# Patient Record
Sex: FEMALE | Race: WHITE | NOT HISPANIC OR LATINO | Employment: FULL TIME | URBAN - METROPOLITAN AREA
[De-identification: names, ages, dates, MRNs, and addresses within clinical notes are randomized per-mention and may not be internally consistent; named-entity substitution may affect disease eponyms.]

---

## 2018-05-24 ENCOUNTER — OFFICE VISIT (OUTPATIENT)
Dept: URGENT CARE | Facility: CLINIC | Age: 23
End: 2018-05-24
Payer: COMMERCIAL

## 2018-05-24 VITALS
HEIGHT: 66 IN | BODY MASS INDEX: 26.01 KG/M2 | DIASTOLIC BLOOD PRESSURE: 70 MMHG | OXYGEN SATURATION: 100 % | SYSTOLIC BLOOD PRESSURE: 110 MMHG | WEIGHT: 161.81 LBS | RESPIRATION RATE: 15 BRPM | TEMPERATURE: 98 F | HEART RATE: 74 BPM

## 2018-05-24 DIAGNOSIS — J32.9 SINUSITIS, UNSPECIFIED CHRONICITY, UNSPECIFIED LOCATION: Primary | ICD-10-CM

## 2018-05-24 PROCEDURE — 99214 OFFICE O/P EST MOD 30 MIN: CPT | Mod: S$GLB,,, | Performed by: PHYSICIAN ASSISTANT

## 2018-05-24 PROCEDURE — 99999 PR PBB SHADOW E&M-EST. PATIENT-LVL IV: CPT | Mod: PBBFAC,,, | Performed by: PHYSICIAN ASSISTANT

## 2018-05-24 RX ORDER — AMOXICILLIN AND CLAVULANATE POTASSIUM 875; 125 MG/1; MG/1
1 TABLET, FILM COATED ORAL 2 TIMES DAILY
Qty: 14 TABLET | Refills: 0 | Status: SHIPPED | OUTPATIENT
Start: 2018-05-24 | End: 2018-05-31

## 2018-05-24 RX ORDER — PROMETHAZINE HYDROCHLORIDE AND DEXTROMETHORPHAN HYDROBROMIDE 6.25; 15 MG/5ML; MG/5ML
5 SYRUP ORAL EVERY 6 HOURS PRN
Qty: 120 ML | Refills: 0 | Status: SHIPPED | OUTPATIENT
Start: 2018-05-24 | End: 2019-11-18

## 2018-05-24 RX ORDER — BENZONATATE 200 MG/1
200 CAPSULE ORAL 3 TIMES DAILY PRN
Qty: 30 CAPSULE | Refills: 0 | Status: SHIPPED | OUTPATIENT
Start: 2018-05-24 | End: 2019-11-18

## 2018-05-24 NOTE — PATIENT INSTRUCTIONS
Sinusitis (Antibiotic Treatment)    The sinuses are air-filled spaces within the bones of the face. They connect to the inside of the nose. Sinusitis is an inflammation of the tissue lining the sinus cavity. Sinus inflammation can occur during a cold. It can also be due to allergies to pollens and other particles in the air. Sinusitis can cause symptoms of sinus congestion and fullness. A sinus infection causes fever, headache and facial pain. There is often green or yellow drainage from the nose or into the back of the throat (post-nasal drip). You have been given antibiotics to treat this condition.  Home care:  · Take the full course of antibiotics as instructed. Do not stop taking them, even if you feel better.  · Drink plenty of water, hot tea, and other liquids. This may help thin mucus. It also may promote sinus drainage.  · Heat may help soothe painful areas of the face. Use a towel soaked in hot water. Or,  the shower and direct the hot spray onto your face. Using a vaporizer along with a menthol rub at night may also help.   · An expectorant containing guaifenesin may help thin the mucus and promote drainage from the sinuses.  · Over-the-counter decongestants may be used unless a similar medicine was prescribed. Nasal sprays work the fastest. Use one that contains phenylephrine or oxymetazoline. First blow the nose gently. Then use the spray. Do not use these medicines more often than directed on the label or symptoms may get worse. You may also use tablets containing pseudoephedrine. Avoid products that combine ingredients, because side effects may be increased. Read labels. You can also ask the pharmacist for help. (NOTE: Persons with high blood pressure should not use decongestants. They can raise blood pressure.)  · Over-the-counter antihistamines may help if allergies contributed to your sinusitis.    · Do not use nasal rinses or irrigation during an acute sinus infection, unless told to by  your health care provider. Rinsing may spread the infection to other sinuses.  · Use acetaminophen or ibuprofen to control pain, unless another pain medicine was prescribed. (If you have chronic liver or kidney disease or ever had a stomach ulcer, talk with your doctor before using these medicines. Aspirin should never be used in anyone under 18 years of age who is ill with a fever. It may cause severe liver damage.)  · Don't smoke. This can worsen symptoms.  Follow-up care  Follow up with your healthcare provider or our staff if you are not improving within the next week.  When to seek medical advice  Call your healthcare provider if any of these occur:  · Facial pain or headache becoming more severe  · Stiff neck  · Unusual drowsiness or confusion  · Swelling of the forehead or eyelids  · Vision problems, including blurred or double vision  · Fever of 100.4ºF (38ºC) or higher, or as directed by your healthcare provider  · Seizure  · Breathing problems  · Symptoms not resolving within 10 days  Date Last Reviewed: 4/13/2015  © 5155-2447 [employer]. [address] [place] [address]. All rights reserved. This information is not intended as a substitute for professional medical care. Always follow your healthcare professional's instructions.      Take all antibiotics even if you feel better before completing the entire regimen.   -Use normal saline nasal spray during the day every 3 hours for sinus irrigation and congestion.    -Avoid exposure to cigarette smoke.    -Practice good handwashing.  -Use warm compresses to sinuses for relief several times a day  -Increase fluid intake to at least 64 ounces of water per day to keep secretions thin and loose  -Use a humidifier in home to help relieve congestion or steam inhalation three times a day for 20-30 minutes.  -Sleep with head of bed elevated   -Take tylenol or ibuprofen as needed for sinus headache.    -Use warm salt water gargles for throat  discomfort.  -Avoid caffeine and alcohol    Follow up with PCP in 1 week if no improvement or sooner if worsening.    Go to ER if you develop fever of 103 or higher, chest pain, shortness of breath, upper back pain, stiff neck or severe headache.

## 2018-05-24 NOTE — PROGRESS NOTES
"Subjective:       Patient ID: Charlotte Felix is a 23 y.o. female.    Chief Complaint: Cough and Nasal Congestion    Cough   This is a new problem. The current episode started 1 to 4 weeks ago (has been sick for past month, was much better after 2 weeks of URI symptoms then over past 1 week has been getting worse, productive cough, post nasal drainage). The problem has been gradually worsening. The cough is productive of sputum. Associated symptoms include nasal congestion and postnasal drip. Pertinent negatives include no chest pain, chills, ear pain, fever, headaches, myalgias, rash, rhinorrhea, sore throat, shortness of breath or wheezing. Nothing aggravates the symptoms. Treatments tried: dayquil, mucinex, nyquil. The treatment provided mild relief. There is no history of asthma.     Review of Systems   Constitutional: Negative for chills, fatigue and fever.   HENT: Positive for congestion and postnasal drip. Negative for ear discharge, ear pain, rhinorrhea, sinus pressure, sneezing and sore throat.    Eyes: Negative for pain and discharge.   Respiratory: Positive for cough. Negative for shortness of breath and wheezing.    Cardiovascular: Negative for chest pain and leg swelling.   Gastrointestinal: Negative for abdominal pain, nausea and vomiting.   Musculoskeletal: Negative for myalgias.   Skin: Negative for rash.   Neurological: Negative for headaches.       Objective:      /70 (BP Location: Left arm, Patient Position: Sitting, BP Method: Small (Manual))   Pulse 74   Temp 98 °F (36.7 °C) (Tympanic)   Resp 15   Ht 5' 6" (1.676 m)   Wt 73.4 kg (161 lb 13.1 oz)   LMP 05/17/2018   SpO2 100%   BMI 26.12 kg/m²   Physical Exam   Constitutional: She is oriented to person, place, and time. She appears well-developed and well-nourished. No distress.   HENT:   Head: Normocephalic and atraumatic.   Right Ear: Tympanic membrane, external ear and ear canal normal.   Left Ear: Tympanic membrane, external " ear and ear canal normal.   Nose: Nose normal. Right sinus exhibits no maxillary sinus tenderness and no frontal sinus tenderness. Left sinus exhibits no maxillary sinus tenderness and no frontal sinus tenderness.   Mouth/Throat: Oropharynx is clear and moist. No oropharyngeal exudate or posterior oropharyngeal erythema. No tonsillar exudate.   Eyes: Conjunctivae and EOM are normal. Pupils are equal, round, and reactive to light. Right eye exhibits no discharge. Left eye exhibits no discharge.   Neck: Normal range of motion. Neck supple.   Cardiovascular: Normal rate, regular rhythm, normal heart sounds and intact distal pulses.  Exam reveals no gallop and no friction rub.    No murmur heard.  Pulmonary/Chest: Effort normal and breath sounds normal. No stridor. No respiratory distress. She has no wheezes. She has no rales. She exhibits no tenderness.   Lymphadenopathy:     She has no cervical adenopathy.   Neurological: She is alert and oriented to person, place, and time. Coordination normal.   Skin: Skin is warm and dry. No rash noted. She is not diaphoretic. No erythema. No pallor.   Nursing note and vitals reviewed.      Assessment:       1. Sinusitis, unspecified chronicity, unspecified location        Plan:       Sinusitis, unspecified chronicity, unspecified location  -     amoxicillin-clavulanate 875-125mg (AUGMENTIN) 875-125 mg per tablet; Take 1 tablet by mouth 2 (two) times daily.  Dispense: 14 tablet; Refill: 0  -     benzonatate (TESSALON) 200 MG capsule; Take 1 capsule (200 mg total) by mouth 3 (three) times daily as needed for Cough.  Dispense: 30 capsule; Refill: 0  -     promethazine-dextromethorphan (PROMETHAZINE-DM) 6.25-15 mg/5 mL Syrp; Take 5 mLs by mouth every 6 (six) hours as needed (congestion).  Dispense: 120 mL; Refill: 0    May have secondary bacterial infection following initial viral illness with improvement then worsening, start augmentin, continue mucinex and supportive care.      Take  all antibiotics even if you feel better before completing the entire regimen.   -Use normal saline nasal spray during the day every 3 hours for sinus irrigation and congestion.    -Avoid exposure to cigarette smoke.    -Practice good handwashing.  -Use warm compresses to sinuses for relief several times a day  -Increase fluid intake to at least 64 ounces of water per day to keep secretions thin and loose  -Use a humidifier in home to help relieve congestion or steam inhalation three times a day for 20-30 minutes.  -Sleep with head of bed elevated   -Take tylenol or ibuprofen as needed for sinus headache.    -Use warm salt water gargles for throat discomfort.  -Avoid caffeine and alcohol    Follow up with PCP in 1 week if no improvement or sooner if worsening.    Go to ER if you develop fever of 103 or higher, chest pain, shortness of breath, upper back pain, stiff neck or severe headache.    Heather Trant PA-C [facility]

## 2019-05-07 ENCOUNTER — OFFICE VISIT (OUTPATIENT)
Dept: URGENT CARE | Facility: CLINIC | Age: 24
End: 2019-05-07
Payer: COMMERCIAL

## 2019-05-07 VITALS
SYSTOLIC BLOOD PRESSURE: 116 MMHG | WEIGHT: 165.38 LBS | BODY MASS INDEX: 26.58 KG/M2 | TEMPERATURE: 97 F | OXYGEN SATURATION: 98 % | HEART RATE: 78 BPM | HEIGHT: 66 IN | DIASTOLIC BLOOD PRESSURE: 70 MMHG

## 2019-05-07 DIAGNOSIS — J01.90 ACUTE BACTERIAL SINUSITIS: Primary | ICD-10-CM

## 2019-05-07 DIAGNOSIS — B96.89 ACUTE BACTERIAL SINUSITIS: Primary | ICD-10-CM

## 2019-05-07 PROCEDURE — 3008F PR BODY MASS INDEX (BMI) DOCUMENTED: ICD-10-PCS | Mod: CPTII,S$GLB,, | Performed by: PHYSICIAN ASSISTANT

## 2019-05-07 PROCEDURE — 99999 PR PBB SHADOW E&M-EST. PATIENT-LVL III: ICD-10-PCS | Mod: PBBFAC,,, | Performed by: PHYSICIAN ASSISTANT

## 2019-05-07 PROCEDURE — 99214 OFFICE O/P EST MOD 30 MIN: CPT | Mod: S$GLB,,, | Performed by: PHYSICIAN ASSISTANT

## 2019-05-07 PROCEDURE — 3008F BODY MASS INDEX DOCD: CPT | Mod: CPTII,S$GLB,, | Performed by: PHYSICIAN ASSISTANT

## 2019-05-07 PROCEDURE — 99214 PR OFFICE/OUTPT VISIT, EST, LEVL IV, 30-39 MIN: ICD-10-PCS | Mod: S$GLB,,, | Performed by: PHYSICIAN ASSISTANT

## 2019-05-07 PROCEDURE — 99999 PR PBB SHADOW E&M-EST. PATIENT-LVL III: CPT | Mod: PBBFAC,,, | Performed by: PHYSICIAN ASSISTANT

## 2019-05-07 RX ORDER — PREDNISONE 20 MG/1
20 TABLET ORAL DAILY
Qty: 5 TABLET | Refills: 0
Start: 2019-05-07 | End: 2019-05-12

## 2019-05-07 RX ORDER — AZITHROMYCIN 250 MG/1
250 TABLET, FILM COATED ORAL DAILY
Qty: 6 TABLET | Refills: 0
Start: 2019-05-07 | End: 2019-05-12

## 2019-05-07 NOTE — PROGRESS NOTES
Subjective:      Patient ID: Charlotte Felix is a 24 y.o. female.    Chief Complaint: URI    URI    This is a new problem. Episode onset: 3 days ago  The problem has been gradually worsening. There has been no fever. Associated symptoms include congestion, coughing, sinus pain and a sore throat. Pertinent negatives include no abdominal pain, diarrhea, ear pain, nausea, neck pain, rash, vomiting or wheezing. Treatments tried: mucinex      Review of Systems   Constitutional: Negative for fever.   HENT: Positive for congestion, sinus pain and sore throat. Negative for ear discharge and ear pain.    Eyes: Negative for pain and redness.   Respiratory: Positive for cough. Negative for shortness of breath and wheezing.    Gastrointestinal: Negative for abdominal pain, diarrhea, nausea and vomiting.   Musculoskeletal: Negative for neck pain.   Skin: Negative for rash.       Objective:   /70   Pulse 78   Temp 97 °F (36.1 °C)   Wt 75 kg (165 lb 5.5 oz)   SpO2 98%   BMI 26.69 kg/m²   Physical Exam   Constitutional: She is oriented to person, place, and time. She appears well-developed and well-nourished. No distress.   HENT:   Head: Normocephalic.   Right Ear: Tympanic membrane, external ear and ear canal normal.   Left Ear: Tympanic membrane, external ear and ear canal normal.   Nose: No mucosal edema or rhinorrhea. Right sinus exhibits maxillary sinus tenderness. Right sinus exhibits no frontal sinus tenderness. Left sinus exhibits maxillary sinus tenderness. Left sinus exhibits no frontal sinus tenderness.   Mouth/Throat: Uvula is midline, oropharynx is clear and moist and mucous membranes are normal. No oropharyngeal exudate, posterior oropharyngeal edema or posterior oropharyngeal erythema.   Eyes: Conjunctivae and EOM are normal.   Neck: Normal range of motion. Neck supple.   Cardiovascular: Normal rate, regular rhythm and normal heart sounds.   Pulmonary/Chest: Effort normal and breath sounds normal. No  accessory muscle usage. No apnea, no tachypnea and no bradypnea. No respiratory distress. She has no decreased breath sounds. She has no wheezes. She has no rhonchi. She has no rales.   Lymphadenopathy:        Head (right side): No submental, no submandibular and no tonsillar adenopathy present.        Head (left side): No submental, no submandibular and no tonsillar adenopathy present.     She has no cervical adenopathy.   Neurological: She is alert and oriented to person, place, and time.   Skin: Skin is warm and dry. She is not diaphoretic.     Assessment:      1. Acute bacterial sinusitis       Plan:   Acute bacterial sinusitis  -     predniSONE (DELTASONE) 20 MG tablet; Take 1 tablet (20 mg total) by mouth once daily. for 5 days  Dispense: 5 tablet; Refill: 0  -     azithromycin (Z-KALEY) 250 MG tablet; Take 1 tablet (250 mg total) by mouth once daily. Take 2 tablets by mouth on day 1, then one tablet daily on days 2-5. for 5 days  Dispense: 6 tablet; Refill: 0    Acute Sinusitis    -  discussed oral steroid to reduce nasal passageway inflammation.  Patient informed of potential side effects of oral steroid including elevating blood pressure, increased blood glucose levels, increased risk of opportunistic infections, peptic ulcer disease and GI bleeding, insomnia, tremors, suppression of ones own steroid production, avascular necrosis, osteoporosis, increased intraocular pressure, etc. Patient verbalizes risk/benefit profile and agrees to oral steroid   -  will cover with antibiotic for risk of lowering immune system with steroid    -  flonase, nasal saline irrigation, increase fluids     AVS provided and instructions reviewed with patient. Patient was counseled on supportive care and instructed to return or contact primary care provider if condition does not improve or for any new or worsening symptoms.    Love Joseph PA-C   Physician Assistant   [facility]

## 2019-11-18 ENCOUNTER — OFFICE VISIT (OUTPATIENT)
Dept: INTERNAL MEDICINE | Facility: CLINIC | Age: 24
End: 2019-11-18
Payer: COMMERCIAL

## 2019-11-18 VITALS
HEART RATE: 76 BPM | SYSTOLIC BLOOD PRESSURE: 118 MMHG | WEIGHT: 169.31 LBS | TEMPERATURE: 98 F | DIASTOLIC BLOOD PRESSURE: 80 MMHG | BODY MASS INDEX: 27.21 KG/M2 | HEIGHT: 66 IN

## 2019-11-18 DIAGNOSIS — R10.11 RUQ PAIN: Primary | ICD-10-CM

## 2019-11-18 PROCEDURE — 99999 PR PBB SHADOW E&M-EST. PATIENT-LVL III: ICD-10-PCS | Mod: PBBFAC,,, | Performed by: PHYSICIAN ASSISTANT

## 2019-11-18 PROCEDURE — 99213 OFFICE O/P EST LOW 20 MIN: CPT | Mod: 25,S$GLB,, | Performed by: PHYSICIAN ASSISTANT

## 2019-11-18 PROCEDURE — 90471 IMMUNIZATION ADMIN: CPT | Mod: S$GLB,,, | Performed by: PHYSICIAN ASSISTANT

## 2019-11-18 PROCEDURE — 99999 PR PBB SHADOW E&M-EST. PATIENT-LVL III: CPT | Mod: PBBFAC,,, | Performed by: PHYSICIAN ASSISTANT

## 2019-11-18 PROCEDURE — 90686 IIV4 VACC NO PRSV 0.5 ML IM: CPT | Mod: S$GLB,,, | Performed by: PHYSICIAN ASSISTANT

## 2019-11-18 PROCEDURE — 99213 PR OFFICE/OUTPT VISIT, EST, LEVL III, 20-29 MIN: ICD-10-PCS | Mod: 25,S$GLB,, | Performed by: PHYSICIAN ASSISTANT

## 2019-11-18 PROCEDURE — 90686 FLU VACCINE (QUAD) GREATER THAN OR EQUAL TO 3YO PRESERVATIVE FREE IM: ICD-10-PCS | Mod: S$GLB,,, | Performed by: PHYSICIAN ASSISTANT

## 2019-11-18 PROCEDURE — 3008F PR BODY MASS INDEX (BMI) DOCUMENTED: ICD-10-PCS | Mod: CPTII,S$GLB,, | Performed by: PHYSICIAN ASSISTANT

## 2019-11-18 PROCEDURE — 90471 FLU VACCINE (QUAD) GREATER THAN OR EQUAL TO 3YO PRESERVATIVE FREE IM: ICD-10-PCS | Mod: S$GLB,,, | Performed by: PHYSICIAN ASSISTANT

## 2019-11-18 PROCEDURE — 3008F BODY MASS INDEX DOCD: CPT | Mod: CPTII,S$GLB,, | Performed by: PHYSICIAN ASSISTANT

## 2019-11-18 RX ORDER — FAMOTIDINE 20 MG/1
20 TABLET, FILM COATED ORAL 2 TIMES DAILY
Qty: 60 TABLET | Refills: 0 | Status: SHIPPED | OUTPATIENT
Start: 2019-11-18 | End: 2021-09-29

## 2019-11-18 NOTE — PROGRESS NOTES
"Subjective:       Patient ID: Charlotte Felix is a 24 y.o. female.    Chief Complaint: upper GI       HPI     Patient comes in today for upper GI symptoms   Has been having issues on/off x months    Mainly gets RUQ pain after/during eating fried foods    No n/v    Was seen for this years ago, was onPPI but doesn't remembering taking it for very long    No black/blood stools  Health Maintenance Due   Topic Date Due    Lipid Panel  1995    HPV Vaccines (1 - Female 3-dose series) 01/03/2010    Pap Smear  01/03/2016    TETANUS VACCINE  11/21/2016       Past Medical History:   Diagnosis Date    Acne        Current Outpatient Medications   Medication Sig Dispense Refill    famotidine (PEPCID) 20 MG tablet Take 1 tablet (20 mg total) by mouth 2 (two) times daily. 60 tablet 0     No current facility-administered medications for this visit.        Review of Systems   Constitutional: Negative for fatigue, fever and unexpected weight change.   HENT: Negative for sore throat and trouble swallowing.    Respiratory: Negative for cough and shortness of breath.    Cardiovascular: Negative for chest pain.   Gastrointestinal: Negative for abdominal pain.   Skin: Negative for color change and pallor.   Hematological: Negative for adenopathy. Does not bruise/bleed easily.   All other systems reviewed and are negative.      Objective:   /80   Pulse 76   Temp 98.2 °F (36.8 °C) (Tympanic)   Ht 5' 6" (1.676 m)   Wt 76.8 kg (169 lb 5 oz)   BMI 27.33 kg/m²      Physical Exam   Constitutional: She is oriented to person, place, and time. She appears well-developed and well-nourished. No distress.   HENT:   Head: Normocephalic and atraumatic.   Eyes: Pupils are equal, round, and reactive to light. EOM are normal.   Neck: Normal range of motion. Neck supple.   Cardiovascular: Normal rate and regular rhythm.   Pulmonary/Chest: Effort normal.   Abdominal: Soft. Bowel sounds are normal. She exhibits no distension and no " mass. There is no tenderness. There is no guarding.   Musculoskeletal: She exhibits no edema.   Neurological: She is alert and oriented to person, place, and time.   Skin: Capillary refill takes less than 2 seconds.   Psychiatric: She has a normal mood and affect. Her behavior is normal.         Lab Results   Component Value Date    TSH 3.324 07/25/2016       Assessment:       1. RUQ pain        Plan:   RUQ pain  -     US Abdomen Limited; Future; Expected date: 11/18/2019    Other orders  -     famotidine (PEPCID) 20 MG tablet; Take 1 tablet (20 mg total) by mouth 2 (two) times daily.  Dispense: 60 tablet; Refill: 0  -     Influenza - Quadrivalent (PF)    seems reflux/gallbladder  Avoid fried/fatty foods  US, pepcid

## 2019-11-20 ENCOUNTER — TELEPHONE (OUTPATIENT)
Dept: RADIOLOGY | Facility: HOSPITAL | Age: 24
End: 2019-11-20

## 2019-11-21 ENCOUNTER — HOSPITAL ENCOUNTER (OUTPATIENT)
Dept: RADIOLOGY | Facility: HOSPITAL | Age: 24
Discharge: HOME | End: 2019-11-21
Attending: PHYSICIAN ASSISTANT
Payer: COMMERCIAL

## 2019-11-21 DIAGNOSIS — R10.11 RUQ PAIN: ICD-10-CM

## 2019-11-21 PROCEDURE — 76705 US ABDOMEN LIMITED: ICD-10-PCS | Mod: 26,,, | Performed by: RADIOLOGY

## 2019-11-21 PROCEDURE — 76705 ECHO EXAM OF ABDOMEN: CPT | Mod: 26,,, | Performed by: RADIOLOGY

## 2019-11-21 PROCEDURE — 76705 ECHO EXAM OF ABDOMEN: CPT | Mod: TC

## 2021-09-29 ENCOUNTER — OFFICE VISIT (OUTPATIENT)
Dept: PRIMARY CARE CLINIC | Facility: CLINIC | Age: 26
End: 2021-09-29
Payer: COMMERCIAL

## 2021-09-29 ENCOUNTER — LAB VISIT (OUTPATIENT)
Dept: LAB | Facility: HOSPITAL | Age: 26
End: 2021-09-29
Attending: FAMILY MEDICINE
Payer: COMMERCIAL

## 2021-09-29 VITALS
HEART RATE: 107 BPM | TEMPERATURE: 98 F | OXYGEN SATURATION: 97 % | HEIGHT: 66 IN | WEIGHT: 197.56 LBS | BODY MASS INDEX: 31.75 KG/M2 | SYSTOLIC BLOOD PRESSURE: 122 MMHG | DIASTOLIC BLOOD PRESSURE: 80 MMHG

## 2021-09-29 DIAGNOSIS — R09.81 NASAL CONGESTION: ICD-10-CM

## 2021-09-29 DIAGNOSIS — R10.11 RIGHT UPPER QUADRANT PAIN: Primary | ICD-10-CM

## 2021-09-29 DIAGNOSIS — R10.11 RIGHT UPPER QUADRANT PAIN: ICD-10-CM

## 2021-09-29 LAB
ALBUMIN SERPL BCP-MCNC: 3.7 G/DL (ref 3.5–5.2)
ALP SERPL-CCNC: 82 U/L (ref 55–135)
ALT SERPL W/O P-5'-P-CCNC: 25 U/L (ref 10–44)
ANION GAP SERPL CALC-SCNC: 10 MMOL/L (ref 8–16)
AST SERPL-CCNC: 21 U/L (ref 10–40)
BASOPHILS # BLD AUTO: 0.06 K/UL (ref 0–0.2)
BASOPHILS NFR BLD: 0.7 % (ref 0–1.9)
BILIRUB SERPL-MCNC: 0.3 MG/DL (ref 0.1–1)
BUN SERPL-MCNC: 10 MG/DL (ref 6–20)
CALCIUM SERPL-MCNC: 9.3 MG/DL (ref 8.7–10.5)
CHLORIDE SERPL-SCNC: 106 MMOL/L (ref 95–110)
CO2 SERPL-SCNC: 24 MMOL/L (ref 23–29)
CREAT SERPL-MCNC: 0.9 MG/DL (ref 0.5–1.4)
DIFFERENTIAL METHOD: NORMAL
EOSINOPHIL # BLD AUTO: 0.1 K/UL (ref 0–0.5)
EOSINOPHIL NFR BLD: 0.8 % (ref 0–8)
ERYTHROCYTE [DISTWIDTH] IN BLOOD BY AUTOMATED COUNT: 13.1 % (ref 11.5–14.5)
EST. GFR  (AFRICAN AMERICAN): >60 ML/MIN/1.73 M^2
EST. GFR  (NON AFRICAN AMERICAN): >60 ML/MIN/1.73 M^2
GLUCOSE SERPL-MCNC: 83 MG/DL (ref 70–110)
HCT VFR BLD AUTO: 42.2 % (ref 37–48.5)
HGB BLD-MCNC: 13.8 G/DL (ref 12–16)
IMM GRANULOCYTES # BLD AUTO: 0.02 K/UL (ref 0–0.04)
IMM GRANULOCYTES NFR BLD AUTO: 0.2 % (ref 0–0.5)
LYMPHOCYTES # BLD AUTO: 2.3 K/UL (ref 1–4.8)
LYMPHOCYTES NFR BLD: 26.2 % (ref 18–48)
MCH RBC QN AUTO: 30.3 PG (ref 27–31)
MCHC RBC AUTO-ENTMCNC: 32.7 G/DL (ref 32–36)
MCV RBC AUTO: 93 FL (ref 82–98)
MONOCYTES # BLD AUTO: 0.4 K/UL (ref 0.3–1)
MONOCYTES NFR BLD: 4.8 % (ref 4–15)
NEUTROPHILS # BLD AUTO: 5.9 K/UL (ref 1.8–7.7)
NEUTROPHILS NFR BLD: 67.3 % (ref 38–73)
NRBC BLD-RTO: 0 /100 WBC
PLATELET # BLD AUTO: 277 K/UL (ref 150–450)
PMV BLD AUTO: 11.7 FL (ref 9.2–12.9)
POTASSIUM SERPL-SCNC: 4.5 MMOL/L (ref 3.5–5.1)
PROT SERPL-MCNC: 8.1 G/DL (ref 6–8.4)
RBC # BLD AUTO: 4.55 M/UL (ref 4–5.4)
SODIUM SERPL-SCNC: 140 MMOL/L (ref 136–145)
T4 FREE SERPL-MCNC: 0.85 NG/DL (ref 0.71–1.51)
TSH SERPL DL<=0.005 MIU/L-ACNC: 1.76 UIU/ML (ref 0.4–4)
WBC # BLD AUTO: 8.73 K/UL (ref 3.9–12.7)

## 2021-09-29 PROCEDURE — 3008F PR BODY MASS INDEX (BMI) DOCUMENTED: ICD-10-PCS | Mod: CPTII,S$GLB,, | Performed by: FAMILY MEDICINE

## 2021-09-29 PROCEDURE — 84439 ASSAY OF FREE THYROXINE: CPT | Performed by: FAMILY MEDICINE

## 2021-09-29 PROCEDURE — 1160F RVW MEDS BY RX/DR IN RCRD: CPT | Mod: CPTII,S$GLB,, | Performed by: FAMILY MEDICINE

## 2021-09-29 PROCEDURE — 3079F DIAST BP 80-89 MM HG: CPT | Mod: CPTII,S$GLB,, | Performed by: FAMILY MEDICINE

## 2021-09-29 PROCEDURE — 3079F PR MOST RECENT DIASTOLIC BLOOD PRESSURE 80-89 MM HG: ICD-10-PCS | Mod: CPTII,S$GLB,, | Performed by: FAMILY MEDICINE

## 2021-09-29 PROCEDURE — 99999 PR PBB SHADOW E&M-EST. PATIENT-LVL IV: CPT | Mod: PBBFAC,,, | Performed by: FAMILY MEDICINE

## 2021-09-29 PROCEDURE — 84443 ASSAY THYROID STIM HORMONE: CPT | Performed by: FAMILY MEDICINE

## 2021-09-29 PROCEDURE — 80053 COMPREHEN METABOLIC PANEL: CPT | Performed by: FAMILY MEDICINE

## 2021-09-29 PROCEDURE — 3074F SYST BP LT 130 MM HG: CPT | Mod: CPTII,S$GLB,, | Performed by: FAMILY MEDICINE

## 2021-09-29 PROCEDURE — 1159F MED LIST DOCD IN RCRD: CPT | Mod: CPTII,S$GLB,, | Performed by: FAMILY MEDICINE

## 2021-09-29 PROCEDURE — 3008F BODY MASS INDEX DOCD: CPT | Mod: CPTII,S$GLB,, | Performed by: FAMILY MEDICINE

## 2021-09-29 PROCEDURE — 99214 PR OFFICE/OUTPT VISIT, EST, LEVL IV, 30-39 MIN: ICD-10-PCS | Mod: S$GLB,,, | Performed by: FAMILY MEDICINE

## 2021-09-29 PROCEDURE — 99999 PR PBB SHADOW E&M-EST. PATIENT-LVL IV: ICD-10-PCS | Mod: PBBFAC,,, | Performed by: FAMILY MEDICINE

## 2021-09-29 PROCEDURE — 3074F PR MOST RECENT SYSTOLIC BLOOD PRESSURE < 130 MM HG: ICD-10-PCS | Mod: CPTII,S$GLB,, | Performed by: FAMILY MEDICINE

## 2021-09-29 PROCEDURE — 85025 COMPLETE CBC W/AUTO DIFF WBC: CPT | Performed by: FAMILY MEDICINE

## 2021-09-29 PROCEDURE — 36415 COLL VENOUS BLD VENIPUNCTURE: CPT | Mod: PN | Performed by: FAMILY MEDICINE

## 2021-09-29 PROCEDURE — 99214 OFFICE O/P EST MOD 30 MIN: CPT | Mod: S$GLB,,, | Performed by: FAMILY MEDICINE

## 2021-09-29 PROCEDURE — 1160F PR REVIEW ALL MEDS BY PRESCRIBER/CLIN PHARMACIST DOCUMENTED: ICD-10-PCS | Mod: CPTII,S$GLB,, | Performed by: FAMILY MEDICINE

## 2021-09-29 PROCEDURE — 86677 HELICOBACTER PYLORI ANTIBODY: CPT | Performed by: FAMILY MEDICINE

## 2021-09-29 PROCEDURE — 1159F PR MEDICATION LIST DOCUMENTED IN MEDICAL RECORD: ICD-10-PCS | Mod: CPTII,S$GLB,, | Performed by: FAMILY MEDICINE

## 2021-09-29 RX ORDER — PANTOPRAZOLE SODIUM 40 MG/1
40 TABLET, DELAYED RELEASE ORAL
Qty: 30 TABLET | Refills: 0 | Status: SHIPPED | OUTPATIENT
Start: 2021-09-29 | End: 2022-05-16

## 2021-09-29 RX ORDER — TIMOLOL MALEATE 5 MG/ML
1 SOLUTION/ DROPS OPHTHALMIC DAILY
COMMUNITY
Start: 2021-09-05 | End: 2022-05-16 | Stop reason: SDUPTHER

## 2021-09-29 RX ORDER — HYOSCYAMINE SULFATE 0.12 MG/1
0.12 TABLET SUBLINGUAL EVERY 4 HOURS PRN
Qty: 30 TABLET | Refills: 1 | Status: SHIPPED | OUTPATIENT
Start: 2021-09-29 | End: 2022-05-16

## 2021-09-30 LAB — H PYLORI IGG SERPL QL IA: NEGATIVE

## 2021-10-04 ENCOUNTER — HOSPITAL ENCOUNTER (OUTPATIENT)
Dept: RADIOLOGY | Facility: HOSPITAL | Age: 26
Discharge: HOME | End: 2021-10-04
Attending: FAMILY MEDICINE
Payer: COMMERCIAL

## 2021-10-04 DIAGNOSIS — R10.11 RIGHT UPPER QUADRANT PAIN: ICD-10-CM

## 2021-10-04 PROCEDURE — 76705 US ABDOMEN LIMITED: ICD-10-PCS | Mod: 26,,, | Performed by: RADIOLOGY

## 2021-10-04 PROCEDURE — 76705 ECHO EXAM OF ABDOMEN: CPT | Mod: 26,,, | Performed by: RADIOLOGY

## 2021-10-04 PROCEDURE — 76705 ECHO EXAM OF ABDOMEN: CPT | Mod: TC

## 2021-10-07 ENCOUNTER — PATIENT OUTREACH (OUTPATIENT)
Dept: ADMINISTRATIVE | Facility: OTHER | Age: 26
End: 2021-10-07

## 2021-10-08 ENCOUNTER — OFFICE VISIT (OUTPATIENT)
Dept: OTOLARYNGOLOGY | Facility: CLINIC | Age: 26
End: 2021-10-08
Payer: COMMERCIAL

## 2021-10-08 VITALS
SYSTOLIC BLOOD PRESSURE: 124 MMHG | HEART RATE: 83 BPM | BODY MASS INDEX: 31.99 KG/M2 | WEIGHT: 198.19 LBS | DIASTOLIC BLOOD PRESSURE: 77 MMHG

## 2021-10-08 DIAGNOSIS — J34.2 NASAL SEPTAL DEVIATION: ICD-10-CM

## 2021-10-08 DIAGNOSIS — J30.89 PERENNIAL ALLERGIC RHINITIS: Primary | ICD-10-CM

## 2021-10-08 DIAGNOSIS — R09.81 NASAL CONGESTION: ICD-10-CM

## 2021-10-08 PROCEDURE — 1159F PR MEDICATION LIST DOCUMENTED IN MEDICAL RECORD: ICD-10-PCS | Mod: CPTII,S$GLB,, | Performed by: STUDENT IN AN ORGANIZED HEALTH CARE EDUCATION/TRAINING PROGRAM

## 2021-10-08 PROCEDURE — 1159F MED LIST DOCD IN RCRD: CPT | Mod: CPTII,S$GLB,, | Performed by: STUDENT IN AN ORGANIZED HEALTH CARE EDUCATION/TRAINING PROGRAM

## 2021-10-08 PROCEDURE — 31231 NASAL ENDOSCOPY DX: CPT | Mod: S$GLB,,, | Performed by: STUDENT IN AN ORGANIZED HEALTH CARE EDUCATION/TRAINING PROGRAM

## 2021-10-08 PROCEDURE — 99999 PR PBB SHADOW E&M-EST. PATIENT-LVL III: ICD-10-PCS | Mod: PBBFAC,,, | Performed by: STUDENT IN AN ORGANIZED HEALTH CARE EDUCATION/TRAINING PROGRAM

## 2021-10-08 PROCEDURE — 3078F PR MOST RECENT DIASTOLIC BLOOD PRESSURE < 80 MM HG: ICD-10-PCS | Mod: CPTII,S$GLB,, | Performed by: STUDENT IN AN ORGANIZED HEALTH CARE EDUCATION/TRAINING PROGRAM

## 2021-10-08 PROCEDURE — 3008F PR BODY MASS INDEX (BMI) DOCUMENTED: ICD-10-PCS | Mod: CPTII,S$GLB,, | Performed by: STUDENT IN AN ORGANIZED HEALTH CARE EDUCATION/TRAINING PROGRAM

## 2021-10-08 PROCEDURE — 99203 OFFICE O/P NEW LOW 30 MIN: CPT | Mod: 25,S$GLB,, | Performed by: STUDENT IN AN ORGANIZED HEALTH CARE EDUCATION/TRAINING PROGRAM

## 2021-10-08 PROCEDURE — 3008F BODY MASS INDEX DOCD: CPT | Mod: CPTII,S$GLB,, | Performed by: STUDENT IN AN ORGANIZED HEALTH CARE EDUCATION/TRAINING PROGRAM

## 2021-10-08 PROCEDURE — 3074F PR MOST RECENT SYSTOLIC BLOOD PRESSURE < 130 MM HG: ICD-10-PCS | Mod: CPTII,S$GLB,, | Performed by: STUDENT IN AN ORGANIZED HEALTH CARE EDUCATION/TRAINING PROGRAM

## 2021-10-08 PROCEDURE — 31231 PR NASAL ENDOSCOPY, DX: ICD-10-PCS | Mod: S$GLB,,, | Performed by: STUDENT IN AN ORGANIZED HEALTH CARE EDUCATION/TRAINING PROGRAM

## 2021-10-08 PROCEDURE — 99203 PR OFFICE/OUTPT VISIT, NEW, LEVL III, 30-44 MIN: ICD-10-PCS | Mod: 25,S$GLB,, | Performed by: STUDENT IN AN ORGANIZED HEALTH CARE EDUCATION/TRAINING PROGRAM

## 2021-10-08 PROCEDURE — 3078F DIAST BP <80 MM HG: CPT | Mod: CPTII,S$GLB,, | Performed by: STUDENT IN AN ORGANIZED HEALTH CARE EDUCATION/TRAINING PROGRAM

## 2021-10-08 PROCEDURE — 3074F SYST BP LT 130 MM HG: CPT | Mod: CPTII,S$GLB,, | Performed by: STUDENT IN AN ORGANIZED HEALTH CARE EDUCATION/TRAINING PROGRAM

## 2021-10-08 PROCEDURE — 99999 PR PBB SHADOW E&M-EST. PATIENT-LVL III: CPT | Mod: PBBFAC,,, | Performed by: STUDENT IN AN ORGANIZED HEALTH CARE EDUCATION/TRAINING PROGRAM

## 2021-10-08 RX ORDER — FLUTICASONE PROPIONATE 50 MCG
1 SPRAY, SUSPENSION (ML) NASAL DAILY
Qty: 16 G | Refills: 2 | Status: SHIPPED | OUTPATIENT
Start: 2021-10-08 | End: 2022-01-06

## 2021-10-08 RX ORDER — DESLORATADINE 5 MG/1
5 TABLET ORAL DAILY
Qty: 30 TABLET | Refills: 11 | Status: SHIPPED | OUTPATIENT
Start: 2021-10-08 | End: 2022-05-16

## 2021-11-01 ENCOUNTER — OFFICE VISIT (OUTPATIENT)
Dept: PRIMARY CARE CLINIC | Facility: CLINIC | Age: 26
End: 2021-11-01
Payer: COMMERCIAL

## 2021-11-01 DIAGNOSIS — R11.0 NAUSEA: ICD-10-CM

## 2021-11-01 DIAGNOSIS — R10.11 CHRONIC RUQ PAIN: Primary | ICD-10-CM

## 2021-11-01 DIAGNOSIS — K76.0 FATTY LIVER: ICD-10-CM

## 2021-11-01 DIAGNOSIS — G89.29 CHRONIC RUQ PAIN: Primary | ICD-10-CM

## 2021-11-01 PROCEDURE — 99214 OFFICE O/P EST MOD 30 MIN: CPT | Mod: 95,,, | Performed by: FAMILY MEDICINE

## 2021-11-01 PROCEDURE — 1160F RVW MEDS BY RX/DR IN RCRD: CPT | Mod: CPTII,95,, | Performed by: FAMILY MEDICINE

## 2021-11-01 PROCEDURE — 1159F PR MEDICATION LIST DOCUMENTED IN MEDICAL RECORD: ICD-10-PCS | Mod: CPTII,95,, | Performed by: FAMILY MEDICINE

## 2021-11-01 PROCEDURE — 1160F PR REVIEW ALL MEDS BY PRESCRIBER/CLIN PHARMACIST DOCUMENTED: ICD-10-PCS | Mod: CPTII,95,, | Performed by: FAMILY MEDICINE

## 2021-11-01 PROCEDURE — 99214 PR OFFICE/OUTPT VISIT, EST, LEVL IV, 30-39 MIN: ICD-10-PCS | Mod: 95,,, | Performed by: FAMILY MEDICINE

## 2021-11-01 PROCEDURE — 1159F MED LIST DOCD IN RCRD: CPT | Mod: CPTII,95,, | Performed by: FAMILY MEDICINE

## 2021-11-01 RX ORDER — DICYCLOMINE HYDROCHLORIDE 20 MG/1
20 TABLET ORAL
Qty: 60 TABLET | Refills: 1 | Status: SHIPPED | OUTPATIENT
Start: 2021-11-01 | End: 2022-05-16

## 2021-11-08 ENCOUNTER — PATIENT MESSAGE (OUTPATIENT)
Dept: PRIMARY CARE CLINIC | Facility: CLINIC | Age: 26
End: 2021-11-08
Payer: COMMERCIAL

## 2021-11-19 ENCOUNTER — HOSPITAL ENCOUNTER (OUTPATIENT)
Dept: RADIOLOGY | Facility: HOSPITAL | Age: 26
Discharge: HOME | End: 2021-11-19
Attending: FAMILY MEDICINE
Payer: COMMERCIAL

## 2021-11-19 DIAGNOSIS — R10.11 RIGHT UPPER QUADRANT PAIN: ICD-10-CM

## 2021-11-19 PROCEDURE — 25000011 NM HEPATOBILIARY(HIDA) WITH PHARM AND EF

## 2021-11-19 PROCEDURE — 78227 NM HEPATOBILIARY(HIDA) WITH PHARM AND EF: ICD-10-PCS | Mod: 26,,, | Performed by: RADIOLOGY

## 2021-11-19 PROCEDURE — 78227 HEPATOBIL SYST IMAGE W/DRUG: CPT | Mod: 26,,, | Performed by: RADIOLOGY

## 2021-11-21 DIAGNOSIS — G89.29 CHRONIC RUQ PAIN: ICD-10-CM

## 2021-11-21 DIAGNOSIS — R10.11 CHRONIC RUQ PAIN: ICD-10-CM

## 2021-11-21 DIAGNOSIS — R94.8 ABNORMAL BILIARY HIDA SCAN: Primary | ICD-10-CM

## 2021-11-22 ENCOUNTER — PATIENT MESSAGE (OUTPATIENT)
Dept: PRIMARY CARE CLINIC | Facility: CLINIC | Age: 26
End: 2021-11-22

## 2021-11-22 ENCOUNTER — OFFICE VISIT (OUTPATIENT)
Dept: PRIMARY CARE CLINIC | Facility: CLINIC | Age: 26
End: 2021-11-22
Payer: COMMERCIAL

## 2021-11-22 DIAGNOSIS — K76.0 FATTY LIVER: ICD-10-CM

## 2021-11-22 DIAGNOSIS — R11.0 NAUSEA: ICD-10-CM

## 2021-11-22 DIAGNOSIS — R10.11 CHRONIC RUQ PAIN: ICD-10-CM

## 2021-11-22 DIAGNOSIS — G89.29 CHRONIC RUQ PAIN: ICD-10-CM

## 2021-11-22 DIAGNOSIS — R94.8 ABNORMAL BILIARY HIDA SCAN: Primary | ICD-10-CM

## 2021-11-22 PROCEDURE — 99214 OFFICE O/P EST MOD 30 MIN: CPT | Mod: 95,,, | Performed by: FAMILY MEDICINE

## 2021-11-22 PROCEDURE — 99214 PR OFFICE/OUTPT VISIT, EST, LEVL IV, 30-39 MIN: ICD-10-PCS | Mod: 95,,, | Performed by: FAMILY MEDICINE

## 2021-11-23 ENCOUNTER — TELEPHONE (OUTPATIENT)
Dept: SURGERY | Facility: CLINIC | Age: 26
End: 2021-11-23
Payer: COMMERCIAL

## 2021-12-16 ENCOUNTER — PATIENT OUTREACH (OUTPATIENT)
Dept: ADMINISTRATIVE | Facility: OTHER | Age: 26
End: 2021-12-16
Payer: COMMERCIAL

## 2021-12-17 ENCOUNTER — OFFICE VISIT (OUTPATIENT)
Dept: OTOLARYNGOLOGY | Facility: CLINIC | Age: 26
End: 2021-12-17
Payer: COMMERCIAL

## 2021-12-17 VITALS — HEIGHT: 66 IN | BODY MASS INDEX: 32.31 KG/M2 | WEIGHT: 201.06 LBS

## 2021-12-17 DIAGNOSIS — J34.2 NASAL SEPTAL DEVIATION: ICD-10-CM

## 2021-12-17 DIAGNOSIS — J34.89 NASAL OBSTRUCTION: Primary | ICD-10-CM

## 2021-12-17 DIAGNOSIS — J34.3 HYPERTROPHY OF BOTH INFERIOR NASAL TURBINATES: ICD-10-CM

## 2021-12-17 PROCEDURE — 99999 PR PBB SHADOW E&M-EST. PATIENT-LVL III: CPT | Mod: PBBFAC,,, | Performed by: STUDENT IN AN ORGANIZED HEALTH CARE EDUCATION/TRAINING PROGRAM

## 2021-12-17 PROCEDURE — 99214 PR OFFICE/OUTPT VISIT, EST, LEVL IV, 30-39 MIN: ICD-10-PCS | Mod: S$GLB,,, | Performed by: STUDENT IN AN ORGANIZED HEALTH CARE EDUCATION/TRAINING PROGRAM

## 2021-12-17 PROCEDURE — 99999 PR PBB SHADOW E&M-EST. PATIENT-LVL III: ICD-10-PCS | Mod: PBBFAC,,, | Performed by: STUDENT IN AN ORGANIZED HEALTH CARE EDUCATION/TRAINING PROGRAM

## 2021-12-17 PROCEDURE — 99214 OFFICE O/P EST MOD 30 MIN: CPT | Mod: S$GLB,,, | Performed by: STUDENT IN AN ORGANIZED HEALTH CARE EDUCATION/TRAINING PROGRAM

## 2021-12-20 ENCOUNTER — OFFICE VISIT (OUTPATIENT)
Dept: SURGERY | Facility: CLINIC | Age: 26
End: 2021-12-20
Payer: COMMERCIAL

## 2021-12-20 VITALS
BODY MASS INDEX: 32.81 KG/M2 | HEART RATE: 112 BPM | DIASTOLIC BLOOD PRESSURE: 77 MMHG | WEIGHT: 203.25 LBS | SYSTOLIC BLOOD PRESSURE: 124 MMHG | TEMPERATURE: 97 F

## 2021-12-20 DIAGNOSIS — G89.29 CHRONIC RUQ PAIN: ICD-10-CM

## 2021-12-20 DIAGNOSIS — R11.0 NAUSEA: Primary | ICD-10-CM

## 2021-12-20 DIAGNOSIS — R10.11 CHRONIC RUQ PAIN: ICD-10-CM

## 2021-12-20 DIAGNOSIS — R94.8 ABNORMAL BILIARY HIDA SCAN: ICD-10-CM

## 2021-12-20 DIAGNOSIS — R94.8 ABNORMAL BILIARY HIDA SCAN: Primary | ICD-10-CM

## 2021-12-20 PROCEDURE — 99203 OFFICE O/P NEW LOW 30 MIN: CPT | Mod: S$GLB,,, | Performed by: SURGERY

## 2021-12-20 PROCEDURE — 99999 PR PBB SHADOW E&M-EST. PATIENT-LVL III: ICD-10-PCS | Mod: PBBFAC,,, | Performed by: SURGERY

## 2021-12-20 PROCEDURE — 99203 PR OFFICE/OUTPT VISIT, NEW, LEVL III, 30-44 MIN: ICD-10-PCS | Mod: S$GLB,,, | Performed by: SURGERY

## 2021-12-20 PROCEDURE — 99999 PR PBB SHADOW E&M-EST. PATIENT-LVL III: CPT | Mod: PBBFAC,,, | Performed by: SURGERY

## 2021-12-21 NOTE — H&P (VIEW-ONLY)
History & Physical    SUBJECTIVE:     History of Present Illness:  Patient is a 26 y.o. female referred gallbladder. She reports RUQ abdominal pain that has progressively worsened with time. She reports it is fairly constant now. She does have some food associations. Prior U/S normal, recent hida with decreased EF and some reproduction of symptoms with CCK.    Chief Complaint   Patient presents with    Consult     Gall bladder removal       Review of patient's allergies indicates:   Allergen Reactions    Sulfa (sulfonamide antibiotics) Rash       Current Outpatient Medications   Medication Sig Dispense Refill    desloratadine (CLARINEX) 5 mg tablet Take 1 tablet (5 mg total) by mouth once daily. 30 tablet 11    dicyclomine (BENTYL) 20 mg tablet Take 1 tablet (20 mg total) by mouth before meals and at bedtime as needed (abdominal spasms, nausea, diarrhea). 60 tablet 1    fluticasone propionate (FLONASE) 50 mcg/actuation nasal spray 1 spray (50 mcg total) by Each Nostril route once daily. 16 g 2    hyoscyamine (LEVSIN/SL) 0.125 mg Subl Place 1 tablet (0.125 mg total) under the tongue every 4 (four) hours as needed (abdominal pain, spasms). 30 tablet 1    pantoprazole (PROTONIX) 40 MG tablet Take 1 tablet (40 mg total) by mouth before breakfast. 30 tablet 0    VIENVA 0.1-20 mg-mcg per tablet Take 1 tablet by mouth once daily.       No current facility-administered medications for this visit.       Past Medical History:   Diagnosis Date    Acne      History reviewed. No pertinent surgical history.  Family History   Problem Relation Age of Onset    Hyperlipidemia Mother     Hypertension Father     Heart disease Maternal Aunt     Heart disease Maternal Uncle     Cancer Paternal Uncle         bone    Heart disease Maternal Grandmother     Hypertension Maternal Grandfather     Cancer Maternal Grandfather      Social History     Tobacco Use    Smoking status: Never Smoker    Smokeless tobacco: Never Used    Substance Use Topics    Alcohol use: Yes     Alcohol/week: 1.0 standard drink     Types: 1 Glasses of wine per week    Drug use: No        Review of Systems:  Review of Systems   Constitutional: Negative for activity change and unexpected weight change.   HENT: Negative for hearing loss, rhinorrhea and trouble swallowing.    Eyes: Negative for discharge and visual disturbance.   Respiratory: Positive for chest tightness. Negative for wheezing.    Cardiovascular: Positive for chest pain and palpitations.   Gastrointestinal: Positive for abdominal pain. Negative for blood in stool, constipation, diarrhea and vomiting.   Endocrine: Negative for polydipsia and polyuria.   Genitourinary: Negative for difficulty urinating, dysuria, hematuria and menstrual problem.   Musculoskeletal: Negative for arthralgias, joint swelling and neck pain.   Neurological: Negative for weakness and headaches.   Psychiatric/Behavioral: Negative for confusion and dysphoric mood.       OBJECTIVE:     Vital Signs (Most Recent)  Temp: 97 °F (36.1 °C) (12/20/21 1343)  Pulse: (!) 112 (12/20/21 1343)  BP: 124/77 (12/20/21 1343)     92.2 kg (203 lb 4.2 oz)     Physical Exam:  Physical Exam  Vitals reviewed.   Constitutional:       Appearance: She is well-developed and well-nourished.   HENT:      Head: Normocephalic and atraumatic.   Eyes:      Extraocular Movements: EOM normal.   Cardiovascular:      Rate and Rhythm: Normal rate and regular rhythm.   Pulmonary:      Effort: Pulmonary effort is normal.      Breath sounds: Normal breath sounds.   Abdominal:      General: Bowel sounds are normal. There is no distension.      Palpations: Abdomen is soft.      Tenderness: There is no abdominal tenderness.   Musculoskeletal:      Cervical back: Neck supple.   Skin:     General: Skin is warm and dry.   Neurological:      Mental Status: She is alert and oriented to person, place, and time.           Diagnostic Results:  U/S:  Impression:  No acute  abnormality.  Hepatic steatosis.    Hida:  Impression:  Low gallbladder ejection fraction measuring 14% at 30 minutes which may be seen with underlying functional gallbladder disorder.    ASSESSMENT/PLAN:     25 y/o female with biliary dyskinesia    PLAN:Plan     Robotic/Laparoscopic Cholecystectomy   Preop: CBC, CMP, EKG  The risks of robotic/laparoscopic cholecystectomy including bleeding, infection, common bile duct injury, bile leak, injury to abdominal organs, failure to alleviate symptoms, pulmonary embolus, deep vein thrombosis, cardiac event, and possibility of conversion to an open operation were explained to the patient.   The nature of the patient's condition, probability of success, risks of refusing treatment, and alternatives and risks of the alternatives were also explained.  The patient verbalized understanding.

## 2021-12-22 ENCOUNTER — TELEPHONE (OUTPATIENT)
Dept: PREADMISSION TESTING | Facility: HOSPITAL | Age: 26
End: 2021-12-22
Payer: COMMERCIAL

## 2021-12-22 DIAGNOSIS — R94.8 ABNORMAL BILIARY HIDA SCAN: Primary | ICD-10-CM

## 2021-12-27 ENCOUNTER — LAB VISIT (OUTPATIENT)
Dept: LAB | Facility: HOSPITAL | Age: 26
End: 2021-12-27
Attending: SURGERY
Payer: COMMERCIAL

## 2021-12-27 DIAGNOSIS — R94.8 ABNORMAL BILIARY HIDA SCAN: ICD-10-CM

## 2021-12-27 LAB
ALBUMIN SERPL BCP-MCNC: 3.7 G/DL (ref 3.5–5.2)
ALP SERPL-CCNC: 86 U/L (ref 55–135)
ALT SERPL W/O P-5'-P-CCNC: 70 U/L (ref 10–44)
ANION GAP SERPL CALC-SCNC: 5 MMOL/L (ref 8–16)
AST SERPL-CCNC: 38 U/L (ref 10–40)
BILIRUB SERPL-MCNC: 0.3 MG/DL (ref 0.1–1)
BUN SERPL-MCNC: 9 MG/DL (ref 6–20)
CALCIUM SERPL-MCNC: 9.3 MG/DL (ref 8.7–10.5)
CHLORIDE SERPL-SCNC: 105 MMOL/L (ref 95–110)
CO2 SERPL-SCNC: 30 MMOL/L (ref 23–29)
CREAT SERPL-MCNC: 1 MG/DL (ref 0.5–1.4)
EST. GFR  (AFRICAN AMERICAN): >60 ML/MIN/1.73 M^2
EST. GFR  (NON AFRICAN AMERICAN): >60 ML/MIN/1.73 M^2
GLUCOSE SERPL-MCNC: 101 MG/DL (ref 70–110)
POTASSIUM SERPL-SCNC: 4.4 MMOL/L (ref 3.5–5.1)
PROT SERPL-MCNC: 7.3 G/DL (ref 6–8.4)
SODIUM SERPL-SCNC: 140 MMOL/L (ref 136–145)

## 2021-12-27 PROCEDURE — 36415 COLL VENOUS BLD VENIPUNCTURE: CPT | Performed by: SURGERY

## 2021-12-27 PROCEDURE — 85025 COMPLETE CBC W/AUTO DIFF WBC: CPT | Performed by: SURGERY

## 2021-12-27 PROCEDURE — 80053 COMPREHEN METABOLIC PANEL: CPT | Performed by: SURGERY

## 2021-12-28 LAB
BASOPHILS # BLD AUTO: 0.08 K/UL (ref 0–0.2)
BASOPHILS NFR BLD: 0.9 % (ref 0–1.9)
DIFFERENTIAL METHOD: ABNORMAL
EOSINOPHIL # BLD AUTO: 0.2 K/UL (ref 0–0.5)
EOSINOPHIL NFR BLD: 1.8 % (ref 0–8)
ERYTHROCYTE [DISTWIDTH] IN BLOOD BY AUTOMATED COUNT: 12.4 % (ref 11.5–14.5)
HCT VFR BLD AUTO: 42.6 % (ref 37–48.5)
HGB BLD-MCNC: 13.6 G/DL (ref 12–16)
IMM GRANULOCYTES # BLD AUTO: 0.02 K/UL (ref 0–0.04)
IMM GRANULOCYTES NFR BLD AUTO: 0.2 % (ref 0–0.5)
LYMPHOCYTES # BLD AUTO: 2.7 K/UL (ref 1–4.8)
LYMPHOCYTES NFR BLD: 32.2 % (ref 18–48)
MCH RBC QN AUTO: 30.2 PG (ref 27–31)
MCHC RBC AUTO-ENTMCNC: 31.9 G/DL (ref 32–36)
MCV RBC AUTO: 95 FL (ref 82–98)
MONOCYTES # BLD AUTO: 0.5 K/UL (ref 0.3–1)
MONOCYTES NFR BLD: 5.3 % (ref 4–15)
NEUTROPHILS # BLD AUTO: 5.1 K/UL (ref 1.8–7.7)
NEUTROPHILS NFR BLD: 59.6 % (ref 38–73)
NRBC BLD-RTO: 0 /100 WBC
PLATELET # BLD AUTO: 274 K/UL (ref 150–450)
PMV BLD AUTO: 12.5 FL (ref 9.2–12.9)
RBC # BLD AUTO: 4.51 M/UL (ref 4–5.4)
WBC # BLD AUTO: 8.48 K/UL (ref 3.9–12.7)

## 2022-01-06 ENCOUNTER — ANESTHESIA EVENT (OUTPATIENT)
Dept: SURGERY | Facility: HOSPITAL | Age: 27
End: 2022-01-06
Payer: COMMERCIAL

## 2022-01-06 NOTE — ANESTHESIA PREPROCEDURE EVALUATION
01/06/2022  Charlotte Felix is a 27 y.o., female.    Anesthesia Evaluation    I have reviewed the Patient Summary Reports.    I have reviewed the Nursing Notes. I have reviewed the NPO Status.   I have reviewed the Medications.     Review of Systems  Anesthesia Hx:  No problems with previous Anesthesia  Denies Family Hx of Anesthesia complications.   Denies Personal Hx of Anesthesia complications.   Social:  Non-Smoker, Social Alcohol Use    Hematology/Oncology:  Hematology Normal        Cardiovascular:  Cardiovascular Normal     Pulmonary:  Pulmonary Normal    Renal/:  Renal/ Normal     Hepatic/GI:  Hepatic/GI Normal Cholecystitis.  Fatty liver.   Neurological:  Neurology Normal    Psych:  Psychiatric Normal           Physical Exam  General:  Obesity    Airway/Jaw/Neck:  Airway Findings: Mouth Opening: Normal Tongue: Normal  General Airway Assessment: Adult  Mallampati: II  TM Distance: Normal, at least 6 cm  Jaw/Neck Findings:  Neck ROM: Normal ROM  Neck Findings:     Eyes/Ears/Nose:  Eyes/Ears/Nose Findings:    Dental:  Dental Findings: In tact   Chest/Lungs:  Chest/Lungs Findings: Clear to auscultation, Normal Respiratory Rate     Heart/Vascular:  Heart Findings: Rate: Normal  Rhythm: Regular Rhythm  Sounds: Normal  Heart murmur: negative Vascular Findings: Normal    Abdomen:  Abdomen Findings: Normal    Musculoskeletal:  Musculoskeletal Findings: Normal   Skin:  Skin Findings: Normal    Mental Status:  Mental Status Findings:  Alert and Oriented         Anesthesia Plan  Type of Anesthesia, risks & benefits discussed:  Anesthesia Type:  general    Patient's Preference:   Plan Factors:          Intra-op Monitoring Plan: standard ASA monitors  Intra-op Monitoring Plan Comments:   Post Op Pain Control Plan: per primary service following discharge from PACU and multimodal analgesia  Post Op Pain  Control Plan Comments:     Induction:   IV  Beta Blocker:  Patient is not currently on a Beta-Blocker (No further documentation required).       Informed Consent: Patient understands risks and agrees with Anesthesia plan.  Questions answered. Anesthesia consent signed with patient.  ASA Score: 2     Day of Surgery Review of History & Physical:    H&P update referred to the surgeon.         Ready For Surgery From Anesthesia Perspective.

## 2022-01-07 ENCOUNTER — HOSPITAL ENCOUNTER (OUTPATIENT)
Facility: HOSPITAL | Age: 27
Discharge: HOME | End: 2022-01-07
Attending: SURGERY | Admitting: SURGERY
Payer: COMMERCIAL

## 2022-01-07 ENCOUNTER — ANESTHESIA (OUTPATIENT)
Dept: SURGERY | Facility: HOSPITAL | Age: 27
End: 2022-01-07
Payer: COMMERCIAL

## 2022-01-07 VITALS
BODY MASS INDEX: 32.01 KG/M2 | TEMPERATURE: 98 F | WEIGHT: 199.19 LBS | DIASTOLIC BLOOD PRESSURE: 72 MMHG | OXYGEN SATURATION: 99 % | HEIGHT: 66 IN | RESPIRATION RATE: 19 BRPM | SYSTOLIC BLOOD PRESSURE: 133 MMHG | HEART RATE: 84 BPM

## 2022-01-07 DIAGNOSIS — R10.11 CHRONIC RUQ PAIN: ICD-10-CM

## 2022-01-07 DIAGNOSIS — R94.8 ABNORMAL BILIARY HIDA SCAN: Primary | ICD-10-CM

## 2022-01-07 DIAGNOSIS — G89.29 CHRONIC RUQ PAIN: ICD-10-CM

## 2022-01-07 LAB
B-HCG UR QL: NEGATIVE
CTP QC/QA: YES
SARS-COV-2 RNA RESP QL NAA+PROBE: NEGATIVE

## 2022-01-07 PROCEDURE — 25000003 PHARM REV CODE 250: Performed by: NURSE ANESTHETIST, CERTIFIED REGISTERED

## 2022-01-07 PROCEDURE — D9220A PRA ANESTHESIA: Mod: ,,, | Performed by: NURSE ANESTHETIST, CERTIFIED REGISTERED

## 2022-01-07 PROCEDURE — 88304 PR  SURG PATH,LEVEL III: ICD-10-PCS | Mod: 26,,, | Performed by: STUDENT IN AN ORGANIZED HEALTH CARE EDUCATION/TRAINING PROGRAM

## 2022-01-07 PROCEDURE — 47562 LAPAROSCOPIC CHOLECYSTECTOMY: CPT | Mod: ,,, | Performed by: SURGERY

## 2022-01-07 PROCEDURE — 27201423 OPTIME MED/SURG SUP & DEVICES STERILE SUPPLY: Performed by: SURGERY

## 2022-01-07 PROCEDURE — D9220A PRA ANESTHESIA: ICD-10-PCS | Mod: ,,, | Performed by: NURSE ANESTHETIST, CERTIFIED REGISTERED

## 2022-01-07 PROCEDURE — 37000008 HC ANESTHESIA 1ST 15 MINUTES: Performed by: SURGERY

## 2022-01-07 PROCEDURE — 36000711: Performed by: SURGERY

## 2022-01-07 PROCEDURE — D9220A PRA ANESTHESIA: ICD-10-PCS | Mod: ,,, | Performed by: ANESTHESIOLOGY

## 2022-01-07 PROCEDURE — 63600175 PHARM REV CODE 636 W HCPCS: Performed by: NURSE ANESTHETIST, CERTIFIED REGISTERED

## 2022-01-07 PROCEDURE — 36000710: Performed by: SURGERY

## 2022-01-07 PROCEDURE — 25000003 PHARM REV CODE 250: Performed by: SURGERY

## 2022-01-07 PROCEDURE — 63600175 PHARM REV CODE 636 W HCPCS: Performed by: ANESTHESIOLOGY

## 2022-01-07 PROCEDURE — D9220A PRA ANESTHESIA: Mod: ,,, | Performed by: ANESTHESIOLOGY

## 2022-01-07 PROCEDURE — 71000033 HC RECOVERY, INTIAL HOUR: Performed by: SURGERY

## 2022-01-07 PROCEDURE — 71000015 HC POSTOP RECOV 1ST HR: Performed by: SURGERY

## 2022-01-07 PROCEDURE — 88304 TISSUE EXAM BY PATHOLOGIST: CPT | Performed by: STUDENT IN AN ORGANIZED HEALTH CARE EDUCATION/TRAINING PROGRAM

## 2022-01-07 PROCEDURE — 47562 PR LAP,CHOLECYSTECTOMY: ICD-10-PCS | Mod: ,,, | Performed by: SURGERY

## 2022-01-07 PROCEDURE — 81025 URINE PREGNANCY TEST: CPT | Performed by: SURGERY

## 2022-01-07 PROCEDURE — 88304 TISSUE EXAM BY PATHOLOGIST: CPT | Mod: 26,,, | Performed by: STUDENT IN AN ORGANIZED HEALTH CARE EDUCATION/TRAINING PROGRAM

## 2022-01-07 PROCEDURE — 37000009 HC ANESTHESIA EA ADD 15 MINS: Performed by: SURGERY

## 2022-01-07 RX ORDER — HYDROCODONE BITARTRATE AND ACETAMINOPHEN 5; 325 MG/1; MG/1
2 TABLET ORAL EVERY 4 HOURS PRN
Qty: 20 TABLET | Refills: 0 | Status: ON HOLD | OUTPATIENT
Start: 2022-01-07 | End: 2022-02-02 | Stop reason: SDUPTHER

## 2022-01-07 RX ORDER — HYDROCODONE BITARTRATE AND ACETAMINOPHEN 5; 325 MG/1; MG/1
1 TABLET ORAL
Status: DISCONTINUED | OUTPATIENT
Start: 2022-01-07 | End: 2022-01-07 | Stop reason: HOSPADM

## 2022-01-07 RX ORDER — ONDANSETRON 8 MG/1
8 TABLET, ORALLY DISINTEGRATING ORAL EVERY 8 HOURS PRN
Qty: 20 TABLET | Refills: 0 | Status: SHIPPED | OUTPATIENT
Start: 2022-01-07 | End: 2022-05-16

## 2022-01-07 RX ORDER — LIDOCAINE HYDROCHLORIDE 10 MG/ML
1 INJECTION, SOLUTION EPIDURAL; INFILTRATION; INTRACAUDAL; PERINEURAL ONCE AS NEEDED
Status: DISCONTINUED | OUTPATIENT
Start: 2022-01-07 | End: 2022-01-07 | Stop reason: HOSPADM

## 2022-01-07 RX ORDER — LIDOCAINE HYDROCHLORIDE 10 MG/ML
INJECTION, SOLUTION EPIDURAL; INFILTRATION; INTRACAUDAL; PERINEURAL
Status: DISCONTINUED | OUTPATIENT
Start: 2022-01-07 | End: 2022-01-07 | Stop reason: HOSPADM

## 2022-01-07 RX ORDER — INDOCYANINE GREEN AND WATER 25 MG
2.5 KIT INJECTION ONCE
Status: COMPLETED | OUTPATIENT
Start: 2022-01-07 | End: 2022-01-07

## 2022-01-07 RX ORDER — ONDANSETRON 2 MG/ML
4 INJECTION INTRAMUSCULAR; INTRAVENOUS ONCE AS NEEDED
Status: COMPLETED | OUTPATIENT
Start: 2022-01-07 | End: 2022-01-07

## 2022-01-07 RX ORDER — DEXMEDETOMIDINE HYDROCHLORIDE 100 UG/ML
INJECTION, SOLUTION INTRAVENOUS
Status: DISCONTINUED | OUTPATIENT
Start: 2022-01-07 | End: 2022-01-07

## 2022-01-07 RX ORDER — SODIUM CHLORIDE, SODIUM LACTATE, POTASSIUM CHLORIDE, CALCIUM CHLORIDE 600; 310; 30; 20 MG/100ML; MG/100ML; MG/100ML; MG/100ML
INJECTION, SOLUTION INTRAVENOUS CONTINUOUS
Status: DISCONTINUED | OUTPATIENT
Start: 2022-01-07 | End: 2022-01-07 | Stop reason: HOSPADM

## 2022-01-07 RX ORDER — ONDANSETRON 2 MG/ML
4 INJECTION INTRAMUSCULAR; INTRAVENOUS EVERY 12 HOURS PRN
Status: DISCONTINUED | OUTPATIENT
Start: 2022-01-07 | End: 2022-01-07 | Stop reason: HOSPADM

## 2022-01-07 RX ORDER — MIDAZOLAM HYDROCHLORIDE 1 MG/ML
INJECTION, SOLUTION INTRAMUSCULAR; INTRAVENOUS
Status: DISCONTINUED | OUTPATIENT
Start: 2022-01-07 | End: 2022-01-07

## 2022-01-07 RX ORDER — BUPIVACAINE HYDROCHLORIDE 2.5 MG/ML
INJECTION, SOLUTION EPIDURAL; INFILTRATION; INTRACAUDAL
Status: DISCONTINUED
Start: 2022-01-07 | End: 2022-01-07 | Stop reason: HOSPADM

## 2022-01-07 RX ORDER — PROPOFOL 10 MG/ML
VIAL (ML) INTRAVENOUS
Status: DISCONTINUED | OUTPATIENT
Start: 2022-01-07 | End: 2022-01-07

## 2022-01-07 RX ORDER — ACETAMINOPHEN 10 MG/ML
INJECTION, SOLUTION INTRAVENOUS
Status: DISCONTINUED | OUTPATIENT
Start: 2022-01-07 | End: 2022-01-07

## 2022-01-07 RX ORDER — BUPIVACAINE HYDROCHLORIDE 2.5 MG/ML
INJECTION, SOLUTION EPIDURAL; INFILTRATION; INTRACAUDAL
Status: DISCONTINUED | OUTPATIENT
Start: 2022-01-07 | End: 2022-01-07 | Stop reason: HOSPADM

## 2022-01-07 RX ORDER — LIDOCAINE HYDROCHLORIDE 10 MG/ML
INJECTION, SOLUTION EPIDURAL; INFILTRATION; INTRACAUDAL; PERINEURAL
Status: DISCONTINUED
Start: 2022-01-07 | End: 2022-01-07 | Stop reason: HOSPADM

## 2022-01-07 RX ORDER — SODIUM CHLORIDE 9 MG/ML
INJECTION, SOLUTION INTRAVENOUS CONTINUOUS
Status: DISCONTINUED | OUTPATIENT
Start: 2022-01-07 | End: 2022-01-07 | Stop reason: HOSPADM

## 2022-01-07 RX ORDER — DEXAMETHASONE SODIUM PHOSPHATE 4 MG/ML
INJECTION, SOLUTION INTRA-ARTICULAR; INTRALESIONAL; INTRAMUSCULAR; INTRAVENOUS; SOFT TISSUE
Status: DISCONTINUED | OUTPATIENT
Start: 2022-01-07 | End: 2022-01-07

## 2022-01-07 RX ORDER — MEPERIDINE HYDROCHLORIDE 25 MG/ML
12.5 INJECTION INTRAMUSCULAR; INTRAVENOUS; SUBCUTANEOUS ONCE
Status: DISCONTINUED | OUTPATIENT
Start: 2022-01-07 | End: 2022-01-07 | Stop reason: HOSPADM

## 2022-01-07 RX ORDER — FENTANYL CITRATE 50 UG/ML
25 INJECTION, SOLUTION INTRAMUSCULAR; INTRAVENOUS EVERY 5 MIN PRN
Status: DISCONTINUED | OUTPATIENT
Start: 2022-01-07 | End: 2022-01-07 | Stop reason: HOSPADM

## 2022-01-07 RX ORDER — IBUPROFEN 800 MG/1
800 TABLET ORAL 3 TIMES DAILY PRN
Qty: 30 TABLET | Refills: 0 | Status: SHIPPED | OUTPATIENT
Start: 2022-01-07 | End: 2022-05-16

## 2022-01-07 RX ORDER — SUCCINYLCHOLINE CHLORIDE 20 MG/ML
INJECTION INTRAMUSCULAR; INTRAVENOUS
Status: DISCONTINUED | OUTPATIENT
Start: 2022-01-07 | End: 2022-01-07

## 2022-01-07 RX ORDER — DIPHENHYDRAMINE HYDROCHLORIDE 50 MG/ML
25 INJECTION INTRAMUSCULAR; INTRAVENOUS EVERY 6 HOURS PRN
Status: DISCONTINUED | OUTPATIENT
Start: 2022-01-07 | End: 2022-01-07 | Stop reason: HOSPADM

## 2022-01-07 RX ORDER — FENTANYL CITRATE 50 UG/ML
INJECTION, SOLUTION INTRAMUSCULAR; INTRAVENOUS
Status: DISCONTINUED | OUTPATIENT
Start: 2022-01-07 | End: 2022-01-07

## 2022-01-07 RX ORDER — HYDROCODONE BITARTRATE AND ACETAMINOPHEN 10; 325 MG/1; MG/1
1 TABLET ORAL EVERY 4 HOURS PRN
Status: DISCONTINUED | OUTPATIENT
Start: 2022-01-07 | End: 2022-01-07 | Stop reason: HOSPADM

## 2022-01-07 RX ORDER — ONDANSETRON 2 MG/ML
INJECTION INTRAMUSCULAR; INTRAVENOUS
Status: DISCONTINUED | OUTPATIENT
Start: 2022-01-07 | End: 2022-01-07

## 2022-01-07 RX ORDER — ALBUTEROL SULFATE 0.83 MG/ML
2.5 SOLUTION RESPIRATORY (INHALATION) EVERY 4 HOURS PRN
Status: DISCONTINUED | OUTPATIENT
Start: 2022-01-07 | End: 2022-01-07 | Stop reason: HOSPADM

## 2022-01-07 RX ORDER — HYDROCODONE BITARTRATE AND ACETAMINOPHEN 5; 325 MG/1; MG/1
1 TABLET ORAL EVERY 4 HOURS PRN
Status: DISCONTINUED | OUTPATIENT
Start: 2022-01-07 | End: 2022-01-07 | Stop reason: HOSPADM

## 2022-01-07 RX ORDER — ROCURONIUM BROMIDE 10 MG/ML
INJECTION, SOLUTION INTRAVENOUS
Status: DISCONTINUED | OUTPATIENT
Start: 2022-01-07 | End: 2022-01-07

## 2022-01-07 RX ORDER — LIDOCAINE HYDROCHLORIDE 20 MG/ML
INJECTION INTRAVENOUS
Status: DISCONTINUED | OUTPATIENT
Start: 2022-01-07 | End: 2022-01-07

## 2022-01-07 RX ADMIN — PROPOFOL 200 MG: 10 INJECTION, EMULSION INTRAVENOUS at 11:01

## 2022-01-07 RX ADMIN — ONDANSETRON HYDROCHLORIDE 4 MG: 2 SOLUTION INTRAMUSCULAR; INTRAVENOUS at 01:01

## 2022-01-07 RX ADMIN — DEXAMETHASONE SODIUM PHOSPHATE 4 MG: 4 INJECTION, SOLUTION INTRA-ARTICULAR; INTRALESIONAL; INTRAMUSCULAR; INTRAVENOUS; SOFT TISSUE at 11:01

## 2022-01-07 RX ADMIN — ACETAMINOPHEN 1000 MG: 10 INJECTION, SOLUTION INTRAVENOUS at 12:01

## 2022-01-07 RX ADMIN — FENTANYL CITRATE 50 MCG: 50 INJECTION, SOLUTION INTRAMUSCULAR; INTRAVENOUS at 11:01

## 2022-01-07 RX ADMIN — FENTANYL CITRATE 25 MCG: 0.05 INJECTION, SOLUTION INTRAMUSCULAR; INTRAVENOUS at 01:01

## 2022-01-07 RX ADMIN — ONDANSETRON 4 MG: 2 INJECTION, SOLUTION INTRAMUSCULAR; INTRAVENOUS at 12:01

## 2022-01-07 RX ADMIN — FENTANYL CITRATE 100 MCG: 50 INJECTION, SOLUTION INTRAMUSCULAR; INTRAVENOUS at 11:01

## 2022-01-07 RX ADMIN — ROCURONIUM BROMIDE 10 MG: 10 INJECTION, SOLUTION INTRAVENOUS at 11:01

## 2022-01-07 RX ADMIN — ROCURONIUM BROMIDE 30 MG: 10 INJECTION, SOLUTION INTRAVENOUS at 11:01

## 2022-01-07 RX ADMIN — DEXMEDETOMIDINE HYDROCHLORIDE 8 MCG: 100 INJECTION, SOLUTION INTRAVENOUS at 12:01

## 2022-01-07 RX ADMIN — MIDAZOLAM 2 MG: 1 INJECTION INTRAMUSCULAR; INTRAVENOUS at 11:01

## 2022-01-07 RX ADMIN — Medication 75 MG: at 11:01

## 2022-01-07 RX ADMIN — SODIUM CHLORIDE, SODIUM LACTATE, POTASSIUM CHLORIDE, AND CALCIUM CHLORIDE: 600; 310; 30; 20 INJECTION, SOLUTION INTRAVENOUS at 11:01

## 2022-01-07 RX ADMIN — DEXTROSE 2 G: 50 INJECTION, SOLUTION INTRAVENOUS at 11:01

## 2022-01-07 RX ADMIN — SODIUM CHLORIDE, SODIUM LACTATE, POTASSIUM CHLORIDE, AND CALCIUM CHLORIDE: 600; 310; 30; 20 INJECTION, SOLUTION INTRAVENOUS at 12:01

## 2022-01-07 RX ADMIN — SUGAMMADEX 100 MG: 100 INJECTION, SOLUTION INTRAVENOUS at 12:01

## 2022-01-07 RX ADMIN — INDOCYANINE GREEN 2.5 MG: KIT INTRAVENOUS at 11:01

## 2022-01-07 RX ADMIN — SUCCINYLCHOLINE CHLORIDE 100 MG: 20 INJECTION, SOLUTION INTRAMUSCULAR; INTRAVENOUS at 11:01

## 2022-01-07 NOTE — INTERVAL H&P NOTE
The patient has been examined and the H&P has been reviewed:    I concur with the findings and no changes have occurred since H&P was written.    Surgery risks, benefits and alternative options discussed and understood by patient/family.          Active Hospital Problems    Diagnosis  POA    *Abnormal biliary HIDA scan [R94.8]  Yes      Resolved Hospital Problems   No resolved problems to display.

## 2022-01-07 NOTE — ANESTHESIA POSTPROCEDURE EVALUATION
Anesthesia Post Evaluation    Patient: Charlotte Patricia    Procedure(s) Performed: Procedure(s) (LRB):  XI ROBOTIC CHOLECYSTECTOMY (N/A)    Final Anesthesia Type: general      Patient location during evaluation: PACU  Patient participation: Yes- Able to Participate  Level of consciousness: awake and alert and oriented  Post-procedure vital signs: reviewed and stable  Pain management: adequate  Airway patency: patent    PONV status at discharge: No PONV  Anesthetic complications: no      Cardiovascular status: blood pressure returned to baseline, stable and hemodynamically stable  Respiratory status: unassisted  Hydration status: euvolemic  Follow-up not needed.          Vitals Value Taken Time   /72 01/07/22 1345   Temp 36.5 °C (97.7 °F) 01/07/22 1345   Pulse 84 01/07/22 1345   Resp 19 01/07/22 1345   SpO2 99 % 01/07/22 1345         Event Time   Out of Recovery 13:34:00         Pain/Chritsos Score: Pain Rating Prior to Med Admin: 6 (1/7/2022  1:37 PM)  Christos Score: 10 (1/7/2022  1:45 PM)

## 2022-01-07 NOTE — BRIEF OP NOTE
[facility] - Periop Services  Brief Operative Note    Surgery Date: 1/7/2022     Surgeon(s) and Role:     * Ernesto Plaza MD - Primary    Assisting Surgeon: None    Pre-op Diagnosis:  Abnormal biliary HIDA scan [R94.8]    Post-op Diagnosis:  Post-Op Diagnosis Codes:     * Abnormal biliary HIDA scan [R94.8]    Procedure(s) (LRB):  XI ROBOTIC CHOLECYSTECTOMY (N/A)    Anesthesia: General    Operative Findings: see op note    Estimated Blood Loss: * No values recorded between 1/7/2022 11:33 AM and 1/7/2022 12:36 PM *         Specimens:   Specimen (24h ago, onward)             Start     Ordered    01/07/22 1155  Specimen to Pathology, Surgery General Surgery  Once        Comments: Pre-op Diagnosis: Abnormal biliary HIDA scan [R94.8]    Procedure(s):  XI ROBOTIC CHOLECYSTECTOMY     Number of specimens: 1    Name of specimens: 1) gall bladder-PERM   References:    Click here for ordering Quick Tip   Question Answer Comment   Procedure Type: General Surgery    Specimen Class: Routine/Screening    Release to patient Immediate        01/07/22 1216                  Discharge Note    OUTCOME: Patient tolerated treatment/procedure well without complication and is now ready for discharge.    DISPOSITION: Home or Self Care    FINAL DIAGNOSIS:  Abnormal biliary HIDA scan    FOLLOWUP: In clinic    DISCHARGE INSTRUCTIONS:    Discharge Procedure Orders   Diet general     Lifting restrictions   Order Comments: No heavy lifting > 10lbs or strenuous activity x 2 weeks     Call MD for:  temperature >100.4     Call MD for:  persistent nausea and vomiting     Call MD for:  severe uncontrolled pain     Call MD for:  difficulty breathing, headache or visual disturbances     Call MD for:  redness, tenderness, or signs of infection (pain, swelling, redness, odor or green/yellow discharge around incision site)     Call MD for:  hives     Call MD for:  persistent dizziness or light-headedness     Call MD for:  extreme fatigue     Activity as  tolerated     Shower on day dressing removed (No bath)   Order Comments: Ok to shower in 48 hours

## 2022-01-07 NOTE — ANESTHESIA PROCEDURE NOTES
Intubation    Date/Time: 1/7/2022 11:21 AM  Performed by: Malika Whitlock CRNA  Authorized by: Marj Dejesus MD     Intubation:     Induction:  Intravenous    Intubated:  Postinduction    Mask Ventilation:  Easy mask    Attempts:  1    Attempted By:  CRNA    Method of Intubation:  Direct    Blade:  Camacho 2    Laryngeal View Grade: Grade I - full view of cords      Difficult Airway Encountered?: No      Complications:  None    Airway Device:  Oral endotracheal tube    Airway Device Size:  7.0    Style/Cuff Inflation:  Cuffed (inflated to minimal occlusive pressure)    Inflation Amount (mL):  6    Tube secured:  21    Secured at:  The lips    Placement Verified By:  Capnometry    Complicating Factors:  None    Findings Post-Intubation:  BS equal bilateral

## 2022-01-07 NOTE — TRANSFER OF CARE
"Anesthesia Transfer of Care Note    Patient: Charlotte Patricia    Procedure(s) Performed: Procedure(s) (LRB):  XI ROBOTIC CHOLECYSTECTOMY (N/A)    Patient location: PACU    Anesthesia Type: general    Transport from OR: Transported from OR on room air with adequate spontaneous ventilation    Post pain: adequate analgesia    Post assessment: no apparent anesthetic complications and tolerated procedure well    Post vital signs: stable    Level of consciousness: awake    Nausea/Vomiting: no nausea/vomiting    Complications: none    Transfer of care protocol was followed      Last vitals:   Visit Vitals  /76 (BP Location: Right arm, Patient Position: Sitting)   Pulse 81   Temp 36.5 °C (97.7 °F) (Temporal)   Resp 18   Ht 5' 6" (1.676 m)   Wt 90.4 kg (199 lb 3 oz)   LMP 11/25/2021 (Approximate)   SpO2 99%   Breastfeeding No   BMI 32.15 kg/m²     "

## 2022-01-07 NOTE — OP NOTE
[facility] Periop Services  Surgery Department  Operative Note    SUMMARY     Date of Procedure: 1/7/2022     Procedure: Procedure(s) (LRB):  XI ROBOTIC CHOLECYSTECTOMY (N/A)     Surgeon(s) and Role:     * Ernesto Plaza MD - Primary    Assisting Surgeon: None    Pre-Operative Diagnosis: Abnormal biliary HIDA scan [R94.8]    Post-Operative Diagnosis: Post-Op Diagnosis Codes:     * Abnormal biliary HIDA scan [R94.8]    Anesthesia: General    Operative Findings (including complications, if any): robotic cholecystectomy    Description of Technical Procedures: gallbladder    Estimated Blood Loss (EBL): 10cc           Implants: * No implants in log *    Specimens:   Specimen (24h ago, onward)             Start     Ordered    01/07/22 1155  Specimen to Pathology, Surgery General Surgery  Once        Comments: Pre-op Diagnosis: Abnormal biliary HIDA scan [R94.8]    Procedure(s):  XI ROBOTIC CHOLECYSTECTOMY     Number of specimens: 1    Name of specimens: 1) gall bladder-PERM   References:    Click here for ordering Quick Tip   Question Answer Comment   Procedure Type: General Surgery    Specimen Class: Routine/Screening    Release to patient Immediate        01/07/22 1216                        Condition: Good    Disposition: PACU - hemodynamically stable.    Procedure in Detail:  The patient was brought to the OR and underwent general anesthesia. The abdomen was prepped and draped in usual sterile fashion.  An incision was made just above the umbilicus.  Fascia grasped and elevated.  A Veress needle was inserted and insufflation obtained to 15 mm Hg.  An 8 mm robotic Optiview port was placed at this site. The laparoscope was inserted. There was no evidence of a vascular or enteric injury.     Additional trocars were placed as follows under visualization. An 8 mm trocar was placed in the anterior axillary line of the right mid abdomen. An 8 mm trocar was placed in the midclavicular line of the right midabdomen. An 8 mm  robotic trocar was placed in the midclavicular line in the left midabdomen      The patient was placed in reverse Trendelenburg position and rolled to the left. The patient was docked to the da Minerva robot.         The fundus of the gallbladder was retracted cephalad. The gallbladder infundibulum was retracted laterally.      Through meticulous dissection the cystic duct was dissected circumferentially. The cystic artery was dissected circumferentially and the neck of the gallbladder was then dissected off the gallbladder bed. This cleared Calots triangle of all loose areolar tissue and the critical view was obtained.     Indocyanine green with firefly technology was used to elucidate the course of the cystic duct and biliary anatomy.      The cystic artery was clipped with 2 clips proximally 1 clip distally and transected.  The cystic duct was clipped twice proximally and once distally and divided.     The gallbladder was dissected free from the gallbladder bed.    The gallbladder bed was inspected and hemostasis was ensured using hook electrocautery. The area was irrigated until clear.     The umbilical operating arm of the robot was then removed and an Endo Catch bag was inserted. The gallbladder was placed in Endo Catch bag. The patient was then undocked from the da Minerva operating robot. The gallbladder was extracted.      The trocars were removed under direct vision and no bleeding was noted. The abdomen was then desufflated. Marcaine was infiltrated. All incisions were closed with 4-0 Monocryl in a subcuticular manner. Dermaflex was applied to the incision sites

## 2022-01-11 ENCOUNTER — TELEPHONE (OUTPATIENT)
Dept: PREADMISSION TESTING | Facility: HOSPITAL | Age: 27
End: 2022-01-11
Payer: COMMERCIAL

## 2022-01-11 NOTE — TELEPHONE ENCOUNTER
Pre op instructions reviewed with patient per phone.    Spoke about pre op process and surgery instructions, verbalized understanding.    To confirm, Your surgeon has in structed you:  Surgery is scheduled on 2/2/22      Please report to [facility], 1st floor.  Pre admit office to call afternoon prior to surgery with final arrival time.    Important Instructions!!!  DO NOT eat, drink or smoke after 12 midnight!  No water after midnight!  Ok to brush teeth, no gum, candy or mints.    Take only these medications with a small sip of water morning of surgery:      none      ___ Do NOT wear makeup, mascara, nail polish or artificial nails  ___ No powder, lotions or creams to surgical area  ___ Please remove all jewelry, including piercings and leave at home  ___ No money or valuables needed.  Please leave at home  ___ Please bring photo identification and insurance information to hospital  ___ If going home the same day, arrange for a ride home.  You will not be able to drive if Anesthesia was           used.    ___ Only 1 adult allowed (over the age of 18) to accompany you to surgery center.  Ride must           stay during the entire length of the procedure.  No Ubers or Lyfts accepted post anesthesia.                  (Pediatric may have 2 adults to accompany them to the surgery center)  ___ Children, under 12 years old must remain in the waiting room with an adult.  They are not allowed          In patient areas  ___ Wear clean, loose fitting clothing.  Allow for dressings and bandages  ___ Stop Aspirin, Ibuprofen, Motrin and Aleve at least 5-7 days before surgery, unless otherwise           Instructed by your doctor or the nurse.  You MAY use Tylenol/acetaminophen until day of surgery  ___ If you take diabetic medication, do not take the morning of surgery unless instructed by doctor  ___ Stop taking any Fish Oil supplement or any Vitamins that contain Vitamin E at least 5 days  prior to          Surgery.        Bathing Instructions - The night before surgery and the morning prior to coming to the hospital:   __ Do not shave the surgical area                __ Shower and wash your hair and body as usual with anti-bacterial soap and shampoo   __Rinse your hair and body completely   __ Use Dial, Lever 2000 or Hibiclens to shower the night before surgery and the morning of                       Surgery.  Do not scrub your skin too hard   __Do not use Hibiclens on your head, face or genitals   __Do not wash with anti-bacterial soap after you use the Hibiclens   __ Rinse your body thoroughly   __ Dry with clean, soft towel   __ DO NOT use lotion, cream, deodorant or powders on the surgical site  Use antibacterial soap in place of hibinclens if your surgery is on the head, face or genitals    Surgical Site Infection   Prevention of surgical site infections:   __ Keep incisions clean and dry   __ Do not soak/submerge incisions in water until completely healed    __Do not apply lotions, powders, cream or deodorant to site   __ Always make sure hands are cleaned with antibacterial soap / alcohol-based  prior             to touching the surgical site.  (This includes doctors, nurses, staff and yourself.)  Signs and Symptoms   __ Redness and pain around the area where you had surgery   __ Drainage of cloudy fluid from your surgical wound   __ Fever over 100.4 or chills    I have read or had read and explained to me and understand the above information.    Please call [facility] with surgery instructions questions at [phone]  Please call your surgeon's office with any surgery specific questions.  For insurance questions, please call [phone].

## 2022-01-13 LAB
FINAL PATHOLOGIC DIAGNOSIS: NORMAL
GROSS: NORMAL
Lab: NORMAL
MICROSCOPIC EXAM: NORMAL

## 2022-01-20 ENCOUNTER — TELEPHONE (OUTPATIENT)
Dept: SURGERY | Facility: CLINIC | Age: 27
End: 2022-01-20
Payer: COMMERCIAL

## 2022-01-20 NOTE — TELEPHONE ENCOUNTER
----- Message from Destiny Bosch sent at 1/20/2022  8:53 AM CST -----  Contact: Self   Pt is requesting a call regarding questions on her redness, itching, and swelling. Please advise

## 2022-01-24 ENCOUNTER — OFFICE VISIT (OUTPATIENT)
Dept: SURGERY | Facility: CLINIC | Age: 27
End: 2022-01-24
Payer: COMMERCIAL

## 2022-01-24 VITALS
HEART RATE: 97 BPM | TEMPERATURE: 98 F | BODY MASS INDEX: 31.88 KG/M2 | DIASTOLIC BLOOD PRESSURE: 80 MMHG | WEIGHT: 197.56 LBS | SYSTOLIC BLOOD PRESSURE: 115 MMHG

## 2022-01-24 DIAGNOSIS — Z90.49 S/P LAPAROSCOPIC CHOLECYSTECTOMY: Primary | ICD-10-CM

## 2022-01-24 PROBLEM — G89.29 CHRONIC RUQ PAIN: Status: RESOLVED | Noted: 2021-11-01 | Resolved: 2022-01-24

## 2022-01-24 PROBLEM — R94.8 ABNORMAL BILIARY HIDA SCAN: Status: RESOLVED | Noted: 2022-01-07 | Resolved: 2022-01-24

## 2022-01-24 PROBLEM — R10.11 CHRONIC RUQ PAIN: Status: RESOLVED | Noted: 2021-11-01 | Resolved: 2022-01-24

## 2022-01-24 PROCEDURE — 1159F MED LIST DOCD IN RCRD: CPT | Mod: CPTII,S$GLB,, | Performed by: SURGERY

## 2022-01-24 PROCEDURE — 3074F PR MOST RECENT SYSTOLIC BLOOD PRESSURE < 130 MM HG: ICD-10-PCS | Mod: CPTII,S$GLB,, | Performed by: SURGERY

## 2022-01-24 PROCEDURE — 1160F RVW MEDS BY RX/DR IN RCRD: CPT | Mod: CPTII,S$GLB,, | Performed by: SURGERY

## 2022-01-24 PROCEDURE — 99024 POSTOP FOLLOW-UP VISIT: CPT | Mod: S$GLB,,, | Performed by: SURGERY

## 2022-01-24 PROCEDURE — 3079F PR MOST RECENT DIASTOLIC BLOOD PRESSURE 80-89 MM HG: ICD-10-PCS | Mod: CPTII,S$GLB,, | Performed by: SURGERY

## 2022-01-24 PROCEDURE — 3079F DIAST BP 80-89 MM HG: CPT | Mod: CPTII,S$GLB,, | Performed by: SURGERY

## 2022-01-24 PROCEDURE — 3008F PR BODY MASS INDEX (BMI) DOCUMENTED: ICD-10-PCS | Mod: CPTII,S$GLB,, | Performed by: SURGERY

## 2022-01-24 PROCEDURE — 3008F BODY MASS INDEX DOCD: CPT | Mod: CPTII,S$GLB,, | Performed by: SURGERY

## 2022-01-24 PROCEDURE — 99999 PR PBB SHADOW E&M-EST. PATIENT-LVL III: ICD-10-PCS | Mod: PBBFAC,,, | Performed by: SURGERY

## 2022-01-24 PROCEDURE — 99999 PR PBB SHADOW E&M-EST. PATIENT-LVL III: CPT | Mod: PBBFAC,,, | Performed by: SURGERY

## 2022-01-24 PROCEDURE — 1159F PR MEDICATION LIST DOCUMENTED IN MEDICAL RECORD: ICD-10-PCS | Mod: CPTII,S$GLB,, | Performed by: SURGERY

## 2022-01-24 PROCEDURE — 3074F SYST BP LT 130 MM HG: CPT | Mod: CPTII,S$GLB,, | Performed by: SURGERY

## 2022-01-24 PROCEDURE — 1160F PR REVIEW ALL MEDS BY PRESCRIBER/CLIN PHARMACIST DOCUMENTED: ICD-10-PCS | Mod: CPTII,S$GLB,, | Performed by: SURGERY

## 2022-01-24 PROCEDURE — 99024 PR POST-OP FOLLOW-UP VISIT: ICD-10-PCS | Mod: S$GLB,,, | Performed by: SURGERY

## 2022-01-24 NOTE — PROGRESS NOTES
General Surgery Clinic  History and Physical    Patient Name: Charlotte Patricia  YOB: 1995 (27 y.o.)  MRN: [id number]  Today's Date: 01/24/2022    Referring Md:   No referring provider defined for this encounter.    SUBJECTIVE:     Chief Complaint: Post-op f/u    History of Present Illness:  Charlotte Patricia is a 27 y.o. female who presents to the clinic today for post-op f/u s/p lap vineet on 1/7/2022. Overall she has been doing well, and denies N/V/D, abdominal pain. Lap sites are healing well, but she has experienced some skin irritation from the dermabond around her lap sites. She does report some reproducible chest discomfort that is likely due to chronic reflux, could consider MSK origin as well. Advised patient to stay in contact with her PCP regarding this symptom. She denies fever, chills, unintentional weight loss, n/v/d, constipation, hematochezia, dysuria, hematuria, CP, SOB, and all other symptoms. Patient reports being compliant with home medication regimen.         Review of patient's allergies indicates:   Allergen Reactions    Sulfa (sulfonamide antibiotics) Rash       Past Medical History:   Diagnosis Date    Acne      Past Surgical History:   Procedure Laterality Date    ROBOT-ASSISTED CHOLECYSTECTOMY USING DA NICK XI N/A 1/7/2022    Procedure: XI ROBOTIC CHOLECYSTECTOMY;  Surgeon: Ernesto Plaza MD;  Location: [facility];  Service: General;  Laterality: N/A;     Family History   Problem Relation Age of Onset    Hyperlipidemia Mother     Hypertension Father     Heart disease Maternal Aunt     Heart disease Maternal Uncle     Cancer Paternal Uncle         bone    Heart disease Maternal Grandmother     Hypertension Maternal Grandfather     Cancer Maternal Grandfather      Social History     Tobacco Use    Smoking status: Never Smoker    Smokeless tobacco: Never Used   Substance Use Topics    Alcohol use: Yes     Alcohol/week: 1.0 standard drink     Types: 1 Glasses of  wine per week     Comment: occas    Drug use: No        Review of Systems:  Review of Systems   Constitutional: Negative for chills and fever.   HENT: Negative for congestion and sore throat.    Respiratory: Negative for cough and shortness of breath.    Cardiovascular: Negative for leg swelling.        Chest discomfort in parasternal region.    Gastrointestinal: Positive for heartburn. Negative for abdominal pain, constipation, diarrhea, nausea and vomiting.   Genitourinary: Negative for dysuria.   Musculoskeletal: Negative for myalgias.   Skin: Negative for rash.   Neurological: Negative for weakness and headaches.       OBJECTIVE:     Vital Signs (Most Recent)  /80   Pulse 97   Temp 97.5 °F (36.4 °C) (Tympanic)   Wt 89.6 kg (197 lb 8.5 oz)   LMP 01/19/2022 (Exact Date)   BMI 31.88 kg/m²     Physical Exam  Vitals reviewed.   Constitutional:       General: She is not in acute distress.     Appearance: Normal appearance. She is not ill-appearing or toxic-appearing.   HENT:      Head: Normocephalic and atraumatic.   Eyes:      Extraocular Movements: Extraocular movements intact.      Conjunctiva/sclera: Conjunctivae normal.   Cardiovascular:      Rate and Rhythm: Normal rate.   Pulmonary:      Effort: Pulmonary effort is normal. No respiratory distress.   Abdominal:      Palpations: Abdomen is soft.      Tenderness: There is no abdominal tenderness.      Comments: Four lap site healing well. Rash appreciated surrounding lap sites, likely due to dermabond sensitivity. No erythema, edema, warmth, or drainage appreciated.    Musculoskeletal:         General: Normal range of motion.      Cervical back: Normal range of motion.   Skin:     General: Skin is warm and dry.      Capillary Refill: Capillary refill takes less than 2 seconds.   Neurological:      General: No focal deficit present.      Mental Status: She is alert and oriented to person, place, and time.   Psychiatric:         Mood and Affect: Mood  normal.         Behavior: Behavior normal.         Thought Content: Thought content normal.         Judgment: Judgment normal.           Labs: N/A      Imaging: N/A      ASSESSMENT/PLAN:     Charlotte was seen today for post-op evaluation.    Diagnoses and all orders for this visit:    S/P laparoscopic cholecystectomy      Charlotte Patricia is a 27 y.o. female who presents to the clinic today for post-op f/u s/p lap vineet on 1/7/2022. Overall she has been doing well.   F/u PRN.       Case discussed with Dr. Plaza.      Karin Herrmann, [facility], PA-C  PA Fellow  Department of Surgery

## 2022-01-31 ENCOUNTER — ANESTHESIA EVENT (OUTPATIENT)
Dept: SURGERY | Facility: HOSPITAL | Age: 27
End: 2022-01-31
Payer: COMMERCIAL

## 2022-02-01 ENCOUNTER — TELEPHONE (OUTPATIENT)
Dept: PREADMISSION TESTING | Facility: HOSPITAL | Age: 27
End: 2022-02-01
Payer: COMMERCIAL

## 2022-02-01 NOTE — TELEPHONE ENCOUNTER
Called and spoke with the patient about the following:    Your Surgery arrival time is at 5:30am on 2/2/2022 at [facility] location.    The address is [address] [place] [place]  [address].     Only one adult (over 18) is to accompany you to surgery, unless it is a Pediatric patient, then 2 adults are encouraged to accompany them to the surgery center.    Your ride MUST STAY the entire time until you are discharged.      Please come in the main lobby (located on the 1st floor).     Be prepared to show your photo ID and insurance card.     Masks should be worn by ALL persons entering the building.     Nothing to eat or drink after after midnight, unless you were instructed to take specific medications discussed with the Pre-admit Nurse.     Please call [phone] with any questions or concerns.     Thanks.

## 2022-02-01 NOTE — ANESTHESIA PREPROCEDURE EVALUATION
02/01/2022  Charlotte Patricia is a 27 y.o., female.    Pre-op Assessment    I have reviewed the Patient Summary Reports.     I have reviewed the Nursing Notes. I have reviewed the NPO Status.   I have reviewed the Medications.     Review of Systems  Anesthesia Hx:  No problems with previous Anesthesia Grade 1 view with Camacho 2. History of prior surgery of interest to airway management or planning: Previous anesthesia: General Airway issues documented on chart review include mask, easy, easy direct laryngoscopy    Social:  Non-Smoker, Social Alcohol Use    Hematology/Oncology:  Hematology Normal        Cardiovascular:  Cardiovascular Normal     Pulmonary:  Pulmonary Normal    Renal/:  Renal/ Normal     Hepatic/GI:  Hepatic/GI Normal Cholecystitis.  Fatty liver.   Neurological:  Neurology Normal    Psych:  Psychiatric Normal           Physical Exam  General:  Obesity    Airway/Jaw/Neck:  Airway Findings: Mouth Opening: Normal Tongue: Normal  General Airway Assessment: Adult  Mallampati: II  TM Distance: Normal, at least 6 cm  Jaw/Neck Findings:  Neck ROM: Normal ROM  Neck Findings:     Eyes/Ears/Nose:  Eyes/Ears/Nose Findings:    Dental:  Dental Findings: In tact   Chest/Lungs:  Chest/Lungs Findings: Clear to auscultation, Normal Respiratory Rate     Heart/Vascular:  Heart Findings: Rate: Normal  Rhythm: Regular Rhythm  Sounds: Normal  Heart murmur: negative Vascular Findings: Normal    Abdomen:  Abdomen Findings: Normal    Musculoskeletal:  Musculoskeletal Findings: Normal   Skin:  Skin Findings: Normal    Mental Status:  Mental Status Findings:  Alert and Oriented         Anesthesia Plan  Type of Anesthesia, risks & benefits discussed:  Anesthesia Type:  general    Patient's Preference:   Plan Factors:          Intra-op Monitoring Plan: standard ASA monitors  Intra-op Monitoring Plan Comments:   Post  Op Pain Control Plan: per primary service following discharge from PACU and multimodal analgesia  Post Op Pain Control Plan Comments:     Induction:   IV  Beta Blocker:  Patient is not currently on a Beta-Blocker (No further documentation required).       Informed Consent: Patient understands risks and agrees with Anesthesia plan.  Questions answered. Anesthesia consent signed with patient.  ASA Score: 2     Day of Surgery Review of History & Physical:    H&P update referred to the surgeon.         Ready For Surgery From Anesthesia Perspective.

## 2022-02-02 ENCOUNTER — HOSPITAL ENCOUNTER (OUTPATIENT)
Facility: HOSPITAL | Age: 27
Discharge: HOME | End: 2022-02-02
Attending: STUDENT IN AN ORGANIZED HEALTH CARE EDUCATION/TRAINING PROGRAM | Admitting: STUDENT IN AN ORGANIZED HEALTH CARE EDUCATION/TRAINING PROGRAM
Payer: COMMERCIAL

## 2022-02-02 ENCOUNTER — ANESTHESIA (OUTPATIENT)
Dept: SURGERY | Facility: HOSPITAL | Age: 27
End: 2022-02-02
Payer: COMMERCIAL

## 2022-02-02 ENCOUNTER — PATIENT MESSAGE (OUTPATIENT)
Dept: OTOLARYNGOLOGY | Facility: CLINIC | Age: 27
End: 2022-02-02
Payer: COMMERCIAL

## 2022-02-02 VITALS
SYSTOLIC BLOOD PRESSURE: 144 MMHG | TEMPERATURE: 98 F | OXYGEN SATURATION: 98 % | BODY MASS INDEX: 31.85 KG/M2 | WEIGHT: 198.19 LBS | DIASTOLIC BLOOD PRESSURE: 91 MMHG | RESPIRATION RATE: 15 BRPM | HEART RATE: 104 BPM | HEIGHT: 66 IN

## 2022-02-02 DIAGNOSIS — J34.3 HYPERTROPHY OF BOTH INFERIOR NASAL TURBINATES: ICD-10-CM

## 2022-02-02 DIAGNOSIS — J34.89 NASAL OBSTRUCTION: ICD-10-CM

## 2022-02-02 DIAGNOSIS — J34.2 NASAL SEPTAL DEVIATION: Primary | ICD-10-CM

## 2022-02-02 LAB
B-HCG UR QL: NEGATIVE
CTP QC/QA: YES
SARS-COV-2 RNA NPH QL NAA+NON-PROBE: NOT DETECTED

## 2022-02-02 PROCEDURE — 30802 PR RF ABLATION INF TURBINATE SUBMUCOSAL: ICD-10-PCS | Mod: 51,,, | Performed by: STUDENT IN AN ORGANIZED HEALTH CARE EDUCATION/TRAINING PROGRAM

## 2022-02-02 PROCEDURE — 81025 URINE PREGNANCY TEST: CPT | Performed by: STUDENT IN AN ORGANIZED HEALTH CARE EDUCATION/TRAINING PROGRAM

## 2022-02-02 PROCEDURE — 30520 REPAIR OF NASAL SEPTUM: CPT | Mod: ,,, | Performed by: STUDENT IN AN ORGANIZED HEALTH CARE EDUCATION/TRAINING PROGRAM

## 2022-02-02 PROCEDURE — 36000707: Performed by: STUDENT IN AN ORGANIZED HEALTH CARE EDUCATION/TRAINING PROGRAM

## 2022-02-02 PROCEDURE — D9220A PRA ANESTHESIA: Mod: ,,, | Performed by: NURSE ANESTHETIST, CERTIFIED REGISTERED

## 2022-02-02 PROCEDURE — 37000008 HC ANESTHESIA 1ST 15 MINUTES: Performed by: STUDENT IN AN ORGANIZED HEALTH CARE EDUCATION/TRAINING PROGRAM

## 2022-02-02 PROCEDURE — D9220A PRA ANESTHESIA: Mod: ,,, | Performed by: ANESTHESIOLOGY

## 2022-02-02 PROCEDURE — 25000003 PHARM REV CODE 250: Performed by: STUDENT IN AN ORGANIZED HEALTH CARE EDUCATION/TRAINING PROGRAM

## 2022-02-02 PROCEDURE — 63600175 PHARM REV CODE 636 W HCPCS: Performed by: NURSE ANESTHETIST, CERTIFIED REGISTERED

## 2022-02-02 PROCEDURE — D9220A PRA ANESTHESIA: ICD-10-PCS | Mod: ,,, | Performed by: ANESTHESIOLOGY

## 2022-02-02 PROCEDURE — 37000009 HC ANESTHESIA EA ADD 15 MINS: Performed by: STUDENT IN AN ORGANIZED HEALTH CARE EDUCATION/TRAINING PROGRAM

## 2022-02-02 PROCEDURE — 25000003 PHARM REV CODE 250: Performed by: NURSE ANESTHETIST, CERTIFIED REGISTERED

## 2022-02-02 PROCEDURE — 63600175 PHARM REV CODE 636 W HCPCS: Performed by: ANESTHESIOLOGY

## 2022-02-02 PROCEDURE — 36000706: Performed by: STUDENT IN AN ORGANIZED HEALTH CARE EDUCATION/TRAINING PROGRAM

## 2022-02-02 PROCEDURE — 25000003 PHARM REV CODE 250: Performed by: ANESTHESIOLOGY

## 2022-02-02 PROCEDURE — 27201423 OPTIME MED/SURG SUP & DEVICES STERILE SUPPLY: Performed by: STUDENT IN AN ORGANIZED HEALTH CARE EDUCATION/TRAINING PROGRAM

## 2022-02-02 PROCEDURE — 71000015 HC POSTOP RECOV 1ST HR: Performed by: STUDENT IN AN ORGANIZED HEALTH CARE EDUCATION/TRAINING PROGRAM

## 2022-02-02 PROCEDURE — 71000033 HC RECOVERY, INTIAL HOUR: Performed by: STUDENT IN AN ORGANIZED HEALTH CARE EDUCATION/TRAINING PROGRAM

## 2022-02-02 PROCEDURE — 30520 PR REPAIR, NASAL SEPTUM: ICD-10-PCS | Mod: ,,, | Performed by: STUDENT IN AN ORGANIZED HEALTH CARE EDUCATION/TRAINING PROGRAM

## 2022-02-02 PROCEDURE — D9220A PRA ANESTHESIA: ICD-10-PCS | Mod: ,,, | Performed by: NURSE ANESTHETIST, CERTIFIED REGISTERED

## 2022-02-02 PROCEDURE — 30802 ABLATE INF TURBINATE SUBMUC: CPT | Mod: 51,,, | Performed by: STUDENT IN AN ORGANIZED HEALTH CARE EDUCATION/TRAINING PROGRAM

## 2022-02-02 RX ORDER — LIDOCAINE HCL/EPINEPHRINE/PF 2%-1:200K
VIAL (ML) INJECTION
Status: DISCONTINUED | OUTPATIENT
Start: 2022-02-02 | End: 2022-02-02 | Stop reason: HOSPADM

## 2022-02-02 RX ORDER — ONDANSETRON 2 MG/ML
4 INJECTION INTRAMUSCULAR; INTRAVENOUS ONCE AS NEEDED
Status: DISCONTINUED | OUTPATIENT
Start: 2022-02-02 | End: 2022-02-02 | Stop reason: HOSPADM

## 2022-02-02 RX ORDER — ROCURONIUM BROMIDE 10 MG/ML
INJECTION, SOLUTION INTRAVENOUS
Status: DISCONTINUED | OUTPATIENT
Start: 2022-02-02 | End: 2022-02-02

## 2022-02-02 RX ORDER — ONDANSETRON HYDROCHLORIDE 2 MG/ML
INJECTION, SOLUTION INTRAMUSCULAR; INTRAVENOUS
Status: DISCONTINUED | OUTPATIENT
Start: 2022-02-02 | End: 2022-02-02

## 2022-02-02 RX ORDER — FENTANYL CITRATE 50 UG/ML
INJECTION, SOLUTION INTRAMUSCULAR; INTRAVENOUS
Status: DISCONTINUED | OUTPATIENT
Start: 2022-02-02 | End: 2022-02-02

## 2022-02-02 RX ORDER — OXYMETAZOLINE HCL 0.05 %
SPRAY, NON-AEROSOL (ML) NASAL
Status: DISCONTINUED
Start: 2022-02-02 | End: 2022-02-02 | Stop reason: HOSPADM

## 2022-02-02 RX ORDER — SODIUM CHLORIDE, SODIUM LACTATE, POTASSIUM CHLORIDE, CALCIUM CHLORIDE 600; 310; 30; 20 MG/100ML; MG/100ML; MG/100ML; MG/100ML
INJECTION, SOLUTION INTRAVENOUS CONTINUOUS
Status: ACTIVE | OUTPATIENT
Start: 2022-02-02

## 2022-02-02 RX ORDER — OXYMETAZOLINE HCL 0.05 %
SPRAY, NON-AEROSOL (ML) NASAL
Status: DISCONTINUED | OUTPATIENT
Start: 2022-02-02 | End: 2022-02-02 | Stop reason: HOSPADM

## 2022-02-02 RX ORDER — ALBUTEROL SULFATE 0.83 MG/ML
2.5 SOLUTION RESPIRATORY (INHALATION) EVERY 4 HOURS PRN
Status: DISCONTINUED | OUTPATIENT
Start: 2022-02-02 | End: 2022-02-02 | Stop reason: HOSPADM

## 2022-02-02 RX ORDER — MEPERIDINE HYDROCHLORIDE 25 MG/ML
12.5 INJECTION INTRAMUSCULAR; INTRAVENOUS; SUBCUTANEOUS ONCE
Status: DISCONTINUED | OUTPATIENT
Start: 2022-02-02 | End: 2022-02-02 | Stop reason: HOSPADM

## 2022-02-02 RX ORDER — BACITRACIN ZINC 500 UNIT/G
OINTMENT (GRAM) TOPICAL
Status: DISCONTINUED | OUTPATIENT
Start: 2022-02-02 | End: 2022-02-02 | Stop reason: HOSPADM

## 2022-02-02 RX ORDER — PROPOFOL 10 MG/ML
VIAL (ML) INTRAVENOUS
Status: DISCONTINUED | OUTPATIENT
Start: 2022-02-02 | End: 2022-02-02

## 2022-02-02 RX ORDER — MIDAZOLAM HYDROCHLORIDE 1 MG/ML
INJECTION INTRAMUSCULAR; INTRAVENOUS
Status: DISCONTINUED | OUTPATIENT
Start: 2022-02-02 | End: 2022-02-02

## 2022-02-02 RX ORDER — BACITRACIN ZINC 500 UNIT/G
OINTMENT (GRAM) TOPICAL
Status: DISCONTINUED
Start: 2022-02-02 | End: 2022-02-02 | Stop reason: HOSPADM

## 2022-02-02 RX ORDER — LIDOCAINE HCL/EPINEPHRINE/PF 2%-1:200K
VIAL (ML) INJECTION
Status: DISCONTINUED
Start: 2022-02-02 | End: 2022-02-02 | Stop reason: HOSPADM

## 2022-02-02 RX ORDER — HYDROCODONE BITARTRATE AND ACETAMINOPHEN 5; 325 MG/1; MG/1
1 TABLET ORAL
Status: DISCONTINUED | OUTPATIENT
Start: 2022-02-02 | End: 2022-02-02 | Stop reason: HOSPADM

## 2022-02-02 RX ORDER — HYDROCODONE BITARTRATE AND ACETAMINOPHEN 5; 325 MG/1; MG/1
1 TABLET ORAL EVERY 6 HOURS PRN
Qty: 15 TABLET | Refills: 0 | Status: SHIPPED | OUTPATIENT
Start: 2022-02-02 | End: 2022-05-16

## 2022-02-02 RX ORDER — FENTANYL CITRATE 50 UG/ML
25 INJECTION, SOLUTION INTRAMUSCULAR; INTRAVENOUS EVERY 5 MIN PRN
Status: DISCONTINUED | OUTPATIENT
Start: 2022-02-02 | End: 2022-02-02 | Stop reason: HOSPADM

## 2022-02-02 RX ORDER — BACITRACIN ZINC AND POLYMYXIN B SULFATE 500; 10000 [USP'U]/G; [USP'U]/G
OINTMENT OPHTHALMIC
Status: DISCONTINUED
Start: 2022-02-02 | End: 2022-02-02 | Stop reason: HOSPADM

## 2022-02-02 RX ORDER — LIDOCAINE HYDROCHLORIDE 20 MG/ML
INJECTION, SOLUTION EPIDURAL; INFILTRATION; INTRACAUDAL; PERINEURAL
Status: DISCONTINUED | OUTPATIENT
Start: 2022-02-02 | End: 2022-02-02

## 2022-02-02 RX ORDER — DEXAMETHASONE SODIUM PHOSPHATE 4 MG/ML
INJECTION, SOLUTION INTRA-ARTICULAR; INTRALESIONAL; INTRAMUSCULAR; INTRAVENOUS; SOFT TISSUE
Status: DISCONTINUED | OUTPATIENT
Start: 2022-02-02 | End: 2022-02-02

## 2022-02-02 RX ORDER — DIPHENHYDRAMINE HYDROCHLORIDE 50 MG/ML
25 INJECTION INTRAMUSCULAR; INTRAVENOUS EVERY 6 HOURS PRN
Status: DISCONTINUED | OUTPATIENT
Start: 2022-02-02 | End: 2022-02-02 | Stop reason: HOSPADM

## 2022-02-02 RX ADMIN — HYDROCODONE BITARTRATE AND ACETAMINOPHEN 1 TABLET: 5; 325 TABLET ORAL at 09:02

## 2022-02-02 RX ADMIN — SODIUM CHLORIDE, SODIUM LACTATE, POTASSIUM CHLORIDE, AND CALCIUM CHLORIDE: 600; 310; 30; 20 INJECTION, SOLUTION INTRAVENOUS at 07:02

## 2022-02-02 RX ADMIN — ROCURONIUM BROMIDE 10 MG: 10 INJECTION, SOLUTION INTRAVENOUS at 07:02

## 2022-02-02 RX ADMIN — FENTANYL CITRATE 25 MCG: 50 INJECTION, SOLUTION INTRAMUSCULAR; INTRAVENOUS at 09:02

## 2022-02-02 RX ADMIN — LIDOCAINE HYDROCHLORIDE 50 MG: 20 INJECTION, SOLUTION EPIDURAL; INFILTRATION; INTRACAUDAL; PERINEURAL at 07:02

## 2022-02-02 RX ADMIN — DEXAMETHASONE SODIUM PHOSPHATE 10 MG: 4 INJECTION, SOLUTION INTRA-ARTICULAR; INTRALESIONAL; INTRAMUSCULAR; INTRAVENOUS; SOFT TISSUE at 07:02

## 2022-02-02 RX ADMIN — MIDAZOLAM HYDROCHLORIDE 2 MG: 1 INJECTION INTRAMUSCULAR; INTRAVENOUS at 07:02

## 2022-02-02 RX ADMIN — FENTANYL CITRATE 100 MCG: 50 INJECTION, SOLUTION INTRAMUSCULAR; INTRAVENOUS at 07:02

## 2022-02-02 RX ADMIN — DEXTROSE 2 G: 50 INJECTION, SOLUTION INTRAVENOUS at 07:02

## 2022-02-02 RX ADMIN — PROPOFOL 150 MG: 10 INJECTION, EMULSION INTRAVENOUS at 07:02

## 2022-02-02 RX ADMIN — ONDANSETRON HYDROCHLORIDE 4 MG: 2 INJECTION, SOLUTION INTRAMUSCULAR; INTRAVENOUS at 07:02

## 2022-02-02 NOTE — BRIEF OP NOTE
[facility]  Brief Operative Note     SUMMARY     Surgery Date: 2/2/2022     Surgeon(s) and Role:     * Cesario De Leon MD - Primary    Assisting Surgeon: None    Pre-op Diagnosis:  Nasal obstruction [J34.89]  Hypertrophy of both inferior nasal turbinates [J34.3]  Nasal septal deviation [J34.2]    Post-op Diagnosis:  Post-Op Diagnosis Codes:     * Nasal obstruction [J34.89]     * Hypertrophy of both inferior nasal turbinates [J34.3]     * Nasal septal deviation [J34.2]    Procedure(s) (LRB):  SEPTOPLASTY, NOSE, WITH NASAL TURBINATE REDUCTION (Bilateral)    Anesthesia: General    Findings/Key Components:  Left mid septal and right posterior septal deviation, inferior turbinate hypertrophy     Estimated Blood Loss: 30 mL         Specimens:   Specimen (24h ago, onward)            None          Discharge Note    SUMMARY     Admit Date: 2/2/2022    Discharge Date and Time: No discharge date for patient encounter.    Attending Physician: Cesario De Leon MD     Discharge Provider: Cesario De Leon    Final Diagnosis: Post-Op Diagnosis Codes:     * Nasal obstruction [J34.89]     * Hypertrophy of both inferior nasal turbinates [J34.3]     * Nasal septal deviation [J34.2]    Disposition: Home or Self Care, discharged in good condition    Follow Up/Patient Instructions:       Medications:  Reconciled Home Medications:   Current Discharge Medication List      START taking these medications    Details   HYDROcodone-acetaminophen (NORCO) 5-325 mg per tablet Take 1 tablet by mouth every 6 (six) hours as needed for Pain.  Qty: 15 tablet, Refills: 0    Comments: Quantity prescribed more than 7 day supply? Yes, quantity medically necessary         CONTINUE these medications which have NOT CHANGED    Details   pantoprazole (PROTONIX) 40 MG tablet Take 1 tablet (40 mg total) by mouth before breakfast.  Qty: 30 tablet, Refills: 0      desloratadine (CLARINEX) 5 mg tablet Take 1 tablet (5 mg total) by mouth once daily.  Qty:  30 tablet, Refills: 11      dicyclomine (BENTYL) 20 mg tablet Take 1 tablet (20 mg total) by mouth before meals and at bedtime as needed (abdominal spasms, nausea, diarrhea).  Qty: 60 tablet, Refills: 1      hyoscyamine (LEVSIN/SL) 0.125 mg Subl Place 1 tablet (0.125 mg total) under the tongue every 4 (four) hours as needed (abdominal pain, spasms).  Qty: 30 tablet, Refills: 1      ibuprofen (ADVIL,MOTRIN) 800 MG tablet Take 1 tablet (800 mg total) by mouth 3 (three) times daily as needed for Pain.  Qty: 30 tablet, Refills: 0      ondansetron (ZOFRAN-ODT) 8 MG TbDL Take 1 tablet (8 mg total) by mouth every 8 (eight) hours as needed (nausea/vomiting).  Qty: 20 tablet, Refills: 0      VIENVA 0.1-20 mg-mcg per tablet Take 1 tablet by mouth once daily.           No discharge procedures on file.

## 2022-02-02 NOTE — TRANSFER OF CARE
"Anesthesia Transfer of Care Note    Patient: Charlotte Patricia    Procedure(s) Performed: Procedure(s) (LRB):  SEPTOPLASTY, NOSE, WITH NASAL TURBINATE REDUCTION (Bilateral)    Patient location: PACU    Anesthesia Type: general    Transport from OR: Transported from OR on room air with adequate spontaneous ventilation    Post pain: adequate analgesia    Post assessment: no apparent anesthetic complications and tolerated procedure well    Post vital signs: stable    Level of consciousness: awake and alert    Nausea/Vomiting: no nausea/vomiting    Complications: none    Transfer of care protocol was followed      Last vitals:   Visit Vitals  /64 (BP Location: Left arm, Patient Position: Lying)   Pulse (!) 119   Temp 36.6 °C (97.8 °F) (Temporal)   Resp 16   Ht 5' 6" (1.676 m)   Wt 89.9 kg (198 lb 3.1 oz)   LMP 01/19/2022 (Exact Date)   SpO2 98%   Breastfeeding No   BMI 31.99 kg/m²     "

## 2022-02-02 NOTE — PATIENT INSTRUCTIONS
Septoplasty  Discharge Instructions:   - You may have some swelling of your nose, upper lip, cheeks, or around your eyes after nasal surgery. Your nose may be sore and will bleed. This may last for several days after surgery.   - The tip of your nose and your upper lip and gums may be numb. Feeling will return in a few weeks to a few months.   - Your sense of smell may not be as good after surgery, but this usually returns.   - You may a have a drip pad under your nose to collect mucus and blood. Change it when it bleeds through. You may stop using this once you fell it is not needed.   - You have nasal stents in your nose.  They have a channel to breath through, but this channel frequently clogs with blood and mucus.  You may irrigate your nose with nasal saline irrigation (available over the counter) several times a day to try to keep the breathing channels in the nasal stents open.   - Do not blow your nose when the stents are in place.   - Do not try to remove the stents, your doctor will do that.   - The stents may cause you to breath through your mouth.  Humidification and staying well hydrated can help keep your mouth from drying out.   - Do not blow your nose for 2 weeks after surgery. If you do have to sneeze, do so with your mouth open.  - You will probably be able to return to work or school once the nasal stents are removed.   - You will have to visit your doctor to remove stents about a week after your surgery.     Activity/Restrictions:    - No heavy lifting, straining, or strenuous physical activity for two weeks. Sleep with the head of the bed elevated.     Diet:   - Resume your regular diet, as tolerated     Additional Instructions:   - Try using acetaminophen/Tylenol and ibuprofen/Motrin to control your pain. If this does not bring you relief or the pain remains severe, a stronger pain medication has been prescribed to you.   - DO NOT MIX NARCOTIC PAIN MEDICATIONS OR TAKE NARCOTIC PRESCRIPTIONS AT  THE SAME TIME (PERCODET, LORTAB, ROXICODONE, ETC.)   - DO NOT DRIVE OR OPERATE HEAVY MACHINERY WHILE ON NARCOTICS    - DO NOT TAKE MORE THAN 4 GRAMS (4000mg) OF TYLENOL (ACETAMINOPHEN) IN 24 HOURS     Follow Up:    - A follow up appointment will be scheduled for you in about 5-7 days. If you have not heard from our schedulers in 3 business days please call our clinic using the numbers provided below.       Call your doctor or go to the emergency room if you have:    - Fever of 101.5 degrees or higher   - Severe pain that has increased greatly since your surgery or is uncontrolled by your current pain medications   - Increased redness around your incision (cut); incision pulling apart; thick, white drainage (pus), bleeding, or a large amount of fluid from your incision.   - Nausea and vomiting that does not go away   - Chest pain/shortness of breath   - Any other acute events, problems, or concerns     For any questions, please call our clinic our leave us a My Chart message. [facility]: [phone], then ask for ENT Clinic.   For after hours questions and/or urgent concerns, call the same number above ([phone]) and ask for the on-call ENT physician.

## 2022-02-02 NOTE — H&P
"Day of Surgery History & Physical Exam    Patient Name: Charlotte Patricia     Date: 2/2/2022          HPI: Charlotte is a 27 y.o. female who presents today for operative treatment of septal deviation and inferior turbinate hypertrophy. No recent illnesses..           ROS:    A complete review of systems was obtained and is otherwise No.        PMH:      Past Medical History:   Diagnosis Date    Acne           PSH:      Past Surgical History:   Procedure Laterality Date    ROBOT-ASSISTED CHOLECYSTECTOMY USING DA NICK XI N/A 1/7/2022    Procedure: XI ROBOTIC CHOLECYSTECTOMY;  Surgeon: Ernesto Plaza MD;  Location: [facility];  Service: General;  Laterality: N/A;          MEDS:        Current Facility-Administered Medications:     albuterol nebulizer solution 2.5 mg, 2.5 mg, Nebulization, Q4H PRN, Marj Dejesus MD    bacitracin 500 unit/gram ointment, , , ,     bacitracin-polymyxin b (POLYSPORIN) 500-10,000 unit/gram ophthalmic ointment, , , ,     lactated ringers infusion, , Intravenous, Continuous, Marj Dejesus MD    LIDOcaine-EPINEPHrine (PF) 2%-1:200,000 2 %-1:200,000 injection, , , ,     oxymetazoline (AFRIN) 0.05 % nasal spray, , , ,        ALLERGIES:     Sulfa (sulfonamide antibiotics)       EXAM:     Vitals: /77 (BP Location: Right arm, Patient Position: Sitting)   Pulse 89   Temp 97.7 °F (36.5 °C) (Temporal)   Resp 17   Ht 5' 6" (1.676 m)   Wt 89.9 kg (198 lb 3.1 oz)   LMP 01/19/2022 (Exact Date)   SpO2 96%   Breastfeeding No   BMI 31.99 kg/m²      General: Awake, at baseline alertness.      HEENT: septal deviation, inferior turbinate hypertrophy     Cardiovascular: No cyanosis.     Pulmonary: No audible stridor. Breathing easily with no labor.     Neuro: Symmetric facial movement.      Psychiatry: Appropriate affect and mood.     Skin: No scars or lesions on face or neck.     Extremities: Moves all extremities with normal range of motion.      Other Findings: None.       Assessment & " Plan: Charlotte has diagnoses of septal deviation and inferior turbinate hypertrophy and will go to the OR today for septoplasty and inferior turbinate reduction. Informed consent was obtained in clinic and available in the EMR today. All questions have been answered.

## 2022-02-02 NOTE — ANESTHESIA PROCEDURE NOTES
Intubation    Date/Time: 2/2/2022 7:09 AM  Performed by: Sherman Ulrich CRNA  Authorized by: Marj Dejesus MD     Intubation:     Induction:  Intravenous    Intubated:  Postinduction    Mask Ventilation:  N/a    Attempts:  1    Attempted By:  CRNA    Method of Intubation:  Direct    Blade:  Camacho 2    Laryngeal View Grade: Grade I - full view of cords      Difficult Airway Encountered?: No      Complications:  None    Airway Device:  Oral endotracheal tube    Airway Device Size:  7.0    Style/Cuff Inflation:  Cuffed (inflated to minimal occlusive pressure)    Secured at:  The lips    Placement Verified By:  Capnometry    Complicating Factors:  None    Findings Post-Intubation:  BS equal bilateral  Notes:      RSI w/ cricoid pressure due pt Hx of reflux

## 2022-02-02 NOTE — OP NOTE
DATE OF PROCEDURE:  02/02/2022    PRE-OPERATIVE DIAGNOSIS:   Nasal obstruction  Nasal septal deviation  Inferior turbinate hypertrophy     POST-OPERATIVE DIAGNOSIS:   Same        PROCEDURE:   Septoplasty  Inferior turbinate reduction      SURGEON:   Cesario De Leon MD      ANESTHESIA:   General      ESTIMATED BLOOD LOSS:   30 mL      SPECIMENS:   * No specimens in log *     COMPLICATIONS:   None apparent      INDICATIONS:      Charlotte Patricia is a 27 y.o. female with nasal obstruction, septal deviation, and inferior turbinate hypertrophy who presents today for septoplasty and inferior turbinate reduction. Risks, benefits, and expectations were thoroughly discussed and the patient/patient's family wished to proceed. Informed consent was obtained. All questions were answered.         OPERATIVE DETAILS:   After being properly identified in the preoperative holding area, the patient was brought into the operating suite.  He was placed supine on the operating table.  A pre-procedural time-out was performed. Pre-operative antibiotics and steroids were administered. After induction of general anesthesia, the patient was successfully intubated with confirmation of tube placement via CO2 return.  The bed was then turned 180 degrees and the patient was prepped and draped in the usual fashion for this procedure.      1% lidocaine with epinephrine 1:100,000 was injected into the septum. A #15 blade was used to make a hemitransfixion incision on the left side. The perichondrium was scored with a scalpel and a caudal and then a freer were used to elevate a mucoperichondrial flap back to the bony cartilaginous junction.     A sharp freer was used to make cuts in the cartilaginous septum.  Care was taken to leave an L shaped caudal and dorsal strut >1 cm in place to support the nasal tip. The [place] was used to elevate on both sides of  the cartilage and the cartilage behind and under the strut was removed. Remnant  cartilage on the maxillary spine was removed.      The bony septum was deviated to the right. It was removed with a double-action Rongeur and Live forceps. The inferior bony septum and spur were removed using an Osteotome and Live forceps.      The mucoperichondrial flaps were laid back down into position. A small rent was created in the right mucoperichondrial flap. The nose was inspected and the airway was much improved. The left hemitransfixion incision was closed with interrupted 5-0 fast absorbing gut sutures.  A quilting suture was used to reapproxiamte the mucoperichondrial flaps with a 4-0 chromic gut.    Attention was turned to the inferior turbinate reduction. 1% lidocaine with epinephrine 1:100,000 was injected into the inferior turbinates and an 11cm x 2mm Inferior Tubinate blade ([email or web address]) was inserted just anterior to the head of the inferior turbinate. The inferior turbinate was reduced by advancing the blade submucosally and removing and disrupting tissue between the mucosa and the bone of the turbinate. Moving the blade back and forth while rotating it through four quadrants was performed until the mucosa could be seen to collapse onto the bone. The same procedure was performed on the opposite side. The turbinates were out-fractured using a [place] elevator.      Byers stents coated in bacitracin were placed and secured to the septum with a 3-0 Prolene.      The oropharynx was suctioned clear of blood and secretions.     With the surgical portion of the procedure complete, all instrumentation was then removed from the operative field. The patient's skin was cleaned.  He was returned to the care of the anesthesia team.  He was then weaned from his anesthetic and transported to the PACU in stable condition.

## 2022-02-04 ENCOUNTER — NURSE TRIAGE (OUTPATIENT)
Dept: ADMINISTRATIVE | Facility: CLINIC | Age: 27
End: 2022-02-04

## 2022-02-04 NOTE — ANESTHESIA POSTPROCEDURE EVALUATION
Anesthesia Post Evaluation    Patient: Charlotte Patricia    Procedure(s) Performed: Procedure(s) (LRB):  SEPTOPLASTY, NOSE, WITH NASAL TURBINATE REDUCTION (Bilateral)    Final Anesthesia Type: general      Patient location during evaluation: PACU  Patient participation: Yes- Able to Participate  Level of consciousness: awake and alert and oriented  Post-procedure vital signs: reviewed and stable  Pain management: adequate  Airway patency: patent    PONV status at discharge: No PONV  Anesthetic complications: no      Cardiovascular status: blood pressure returned to baseline, stable and hemodynamically stable  Respiratory status: unassisted  Hydration status: euvolemic  Follow-up not needed.          Vitals Value Taken Time   /91 02/02/22 0950   Temp 36.7 °C (98 °F) 02/02/22 0915   Pulse 108 02/02/22 0952   Resp 14 02/02/22 0952   SpO2 98 % 02/02/22 0952   Vitals shown include unvalidated device data.      Event Time   Out of Recovery 09:20:00         Pain/Christos Score: Pain Rating Prior to Med Admin: 6 (2/2/2022  9:35 AM)  Pain Rating Post Med Admin: 4 (2/2/2022 10:00 AM)  Christos Score: 10 (2/2/2022 10:00 AM)

## 2023-06-13 ENCOUNTER — PATIENT MESSAGE (OUTPATIENT)
Dept: HEPATOLOGY | Facility: CLINIC | Age: 28
End: 2023-06-13
Payer: COMMERCIAL

## 2023-06-13 ENCOUNTER — LAB VISIT (OUTPATIENT)
Dept: LAB | Facility: HOSPITAL | Age: 28
End: 2023-06-13
Attending: FAMILY MEDICINE
Payer: COMMERCIAL

## 2023-06-13 ENCOUNTER — OFFICE VISIT (OUTPATIENT)
Dept: PRIMARY CARE CLINIC | Facility: CLINIC | Age: 28
End: 2023-06-13
Payer: COMMERCIAL

## 2023-06-13 VITALS
SYSTOLIC BLOOD PRESSURE: 108 MMHG | HEART RATE: 96 BPM | DIASTOLIC BLOOD PRESSURE: 72 MMHG | BODY MASS INDEX: 36 KG/M2 | WEIGHT: 224 LBS | TEMPERATURE: 98 F | HEIGHT: 66 IN | OXYGEN SATURATION: 96 %

## 2023-06-13 DIAGNOSIS — K76.0 FATTY LIVER: ICD-10-CM

## 2023-06-13 DIAGNOSIS — R10.11 CHRONIC RUQ PAIN: ICD-10-CM

## 2023-06-13 DIAGNOSIS — Z00.00 ROUTINE GENERAL MEDICAL EXAMINATION AT A HEALTH CARE FACILITY: ICD-10-CM

## 2023-06-13 DIAGNOSIS — R63.5 ABNORMAL WEIGHT GAIN: ICD-10-CM

## 2023-06-13 DIAGNOSIS — E66.01 CLASS 2 SEVERE OBESITY DUE TO EXCESS CALORIES WITH SERIOUS COMORBIDITY AND BODY MASS INDEX (BMI) OF 36.0 TO 36.9 IN ADULT: ICD-10-CM

## 2023-06-13 DIAGNOSIS — G89.29 CHRONIC RUQ PAIN: ICD-10-CM

## 2023-06-13 DIAGNOSIS — R53.83 FATIGUE, UNSPECIFIED TYPE: ICD-10-CM

## 2023-06-13 DIAGNOSIS — G89.29 CHRONIC RUQ PAIN: Primary | ICD-10-CM

## 2023-06-13 DIAGNOSIS — R68.82 DECREASED LIBIDO: ICD-10-CM

## 2023-06-13 DIAGNOSIS — R10.11 CHRONIC RUQ PAIN: Primary | ICD-10-CM

## 2023-06-13 PROBLEM — E66.812 CLASS 2 SEVERE OBESITY DUE TO EXCESS CALORIES WITH SERIOUS COMORBIDITY AND BODY MASS INDEX (BMI) OF 36.0 TO 36.9 IN ADULT: Status: ACTIVE | Noted: 2023-06-13

## 2023-06-13 LAB
ALBUMIN SERPL BCP-MCNC: 3.7 G/DL (ref 3.5–5.2)
ALP SERPL-CCNC: 140 U/L (ref 55–135)
ALT SERPL W/O P-5'-P-CCNC: 117 U/L (ref 10–44)
ANION GAP SERPL CALC-SCNC: 12 MMOL/L (ref 8–16)
AST SERPL-CCNC: 70 U/L (ref 10–40)
BASOPHILS # BLD AUTO: 0.07 K/UL (ref 0–0.2)
BASOPHILS NFR BLD: 0.6 % (ref 0–1.9)
BILIRUB SERPL-MCNC: 0.7 MG/DL (ref 0.1–1)
BUN SERPL-MCNC: 9 MG/DL (ref 6–20)
CALCIUM SERPL-MCNC: 9.2 MG/DL (ref 8.7–10.5)
CHLORIDE SERPL-SCNC: 106 MMOL/L (ref 95–110)
CHOLEST SERPL-MCNC: 230 MG/DL (ref 120–199)
CHOLEST/HDLC SERPL: 5.6 {RATIO} (ref 2–5)
CO2 SERPL-SCNC: 21 MMOL/L (ref 23–29)
CREAT SERPL-MCNC: 0.8 MG/DL (ref 0.5–1.4)
DIFFERENTIAL METHOD: ABNORMAL
EOSINOPHIL # BLD AUTO: 0.1 K/UL (ref 0–0.5)
EOSINOPHIL NFR BLD: 0.8 % (ref 0–8)
ERYTHROCYTE [DISTWIDTH] IN BLOOD BY AUTOMATED COUNT: 12.4 % (ref 11.5–14.5)
EST. GFR  (NO RACE VARIABLE): >60 ML/MIN/1.73 M^2
ESTIMATED AVG GLUCOSE: 114 MG/DL (ref 68–131)
GGT SERPL-CCNC: 161 U/L (ref 8–55)
GLUCOSE SERPL-MCNC: 95 MG/DL (ref 70–110)
HBA1C MFR BLD: 5.6 % (ref 4–5.6)
HCT VFR BLD AUTO: 42.1 % (ref 37–48.5)
HDLC SERPL-MCNC: 41 MG/DL (ref 40–75)
HDLC SERPL: 17.8 % (ref 20–50)
HGB BLD-MCNC: 13.8 G/DL (ref 12–16)
IMM GRANULOCYTES # BLD AUTO: 0.04 K/UL (ref 0–0.04)
IMM GRANULOCYTES NFR BLD AUTO: 0.3 % (ref 0–0.5)
INR PPP: 1 (ref 0.8–1.2)
INSULIN COLLECTION INTERVAL: NORMAL
INSULIN SERPL-ACNC: 15.9 UU/ML
LDLC SERPL CALC-MCNC: 147.6 MG/DL (ref 63–159)
LYMPHOCYTES # BLD AUTO: 2.3 K/UL (ref 1–4.8)
LYMPHOCYTES NFR BLD: 19 % (ref 18–48)
MCH RBC QN AUTO: 29.6 PG (ref 27–31)
MCHC RBC AUTO-ENTMCNC: 32.8 G/DL (ref 32–36)
MCV RBC AUTO: 90 FL (ref 82–98)
MONOCYTES # BLD AUTO: 0.5 K/UL (ref 0.3–1)
MONOCYTES NFR BLD: 4.4 % (ref 4–15)
NEUTROPHILS # BLD AUTO: 8.9 K/UL (ref 1.8–7.7)
NEUTROPHILS NFR BLD: 74.9 % (ref 38–73)
NONHDLC SERPL-MCNC: 189 MG/DL
NRBC BLD-RTO: 0 /100 WBC
PLATELET # BLD AUTO: 285 K/UL (ref 150–450)
PMV BLD AUTO: 12.3 FL (ref 9.2–12.9)
POTASSIUM SERPL-SCNC: 4.1 MMOL/L (ref 3.5–5.1)
PROT SERPL-MCNC: 8.2 G/DL (ref 6–8.4)
PROTHROMBIN TIME: 11.1 SEC (ref 9–12.5)
RBC # BLD AUTO: 4.66 M/UL (ref 4–5.4)
SODIUM SERPL-SCNC: 139 MMOL/L (ref 136–145)
T4 FREE SERPL-MCNC: 0.95 NG/DL (ref 0.71–1.51)
TRIGL SERPL-MCNC: 207 MG/DL (ref 30–150)
TSH SERPL DL<=0.005 MIU/L-ACNC: 2.24 UIU/ML (ref 0.4–4)
WBC # BLD AUTO: 11.82 K/UL (ref 3.9–12.7)

## 2023-06-13 PROCEDURE — 84439 ASSAY OF FREE THYROXINE: CPT | Performed by: FAMILY MEDICINE

## 2023-06-13 PROCEDURE — 1160F PR REVIEW ALL MEDS BY PRESCRIBER/CLIN PHARMACIST DOCUMENTED: ICD-10-PCS | Mod: CPTII,S$GLB,, | Performed by: FAMILY MEDICINE

## 2023-06-13 PROCEDURE — 1159F MED LIST DOCD IN RCRD: CPT | Mod: CPTII,S$GLB,, | Performed by: FAMILY MEDICINE

## 2023-06-13 PROCEDURE — 82306 VITAMIN D 25 HYDROXY: CPT | Performed by: FAMILY MEDICINE

## 2023-06-13 PROCEDURE — 85610 PROTHROMBIN TIME: CPT | Performed by: FAMILY MEDICINE

## 2023-06-13 PROCEDURE — 99395 PR PREVENTIVE VISIT,EST,18-39: ICD-10-PCS | Mod: S$GLB,,, | Performed by: FAMILY MEDICINE

## 2023-06-13 PROCEDURE — 1160F RVW MEDS BY RX/DR IN RCRD: CPT | Mod: CPTII,S$GLB,, | Performed by: FAMILY MEDICINE

## 2023-06-13 PROCEDURE — 3074F SYST BP LT 130 MM HG: CPT | Mod: CPTII,S$GLB,, | Performed by: FAMILY MEDICINE

## 2023-06-13 PROCEDURE — 3008F PR BODY MASS INDEX (BMI) DOCUMENTED: ICD-10-PCS | Mod: CPTII,S$GLB,, | Performed by: FAMILY MEDICINE

## 2023-06-13 PROCEDURE — 82977 ASSAY OF GGT: CPT | Performed by: FAMILY MEDICINE

## 2023-06-13 PROCEDURE — 99999 PR PBB SHADOW E&M-EST. PATIENT-LVL IV: ICD-10-PCS | Mod: PBBFAC,,, | Performed by: FAMILY MEDICINE

## 2023-06-13 PROCEDURE — 99395 PREV VISIT EST AGE 18-39: CPT | Mod: S$GLB,,, | Performed by: FAMILY MEDICINE

## 2023-06-13 PROCEDURE — 3078F DIAST BP <80 MM HG: CPT | Mod: CPTII,S$GLB,, | Performed by: FAMILY MEDICINE

## 2023-06-13 PROCEDURE — 3078F PR MOST RECENT DIASTOLIC BLOOD PRESSURE < 80 MM HG: ICD-10-PCS | Mod: CPTII,S$GLB,, | Performed by: FAMILY MEDICINE

## 2023-06-13 PROCEDURE — 80053 COMPREHEN METABOLIC PANEL: CPT | Performed by: FAMILY MEDICINE

## 2023-06-13 PROCEDURE — 80061 LIPID PANEL: CPT | Performed by: FAMILY MEDICINE

## 2023-06-13 PROCEDURE — 85025 COMPLETE CBC W/AUTO DIFF WBC: CPT | Performed by: FAMILY MEDICINE

## 2023-06-13 PROCEDURE — 84443 ASSAY THYROID STIM HORMONE: CPT | Performed by: FAMILY MEDICINE

## 2023-06-13 PROCEDURE — 1159F PR MEDICATION LIST DOCUMENTED IN MEDICAL RECORD: ICD-10-PCS | Mod: CPTII,S$GLB,, | Performed by: FAMILY MEDICINE

## 2023-06-13 PROCEDURE — 36415 COLL VENOUS BLD VENIPUNCTURE: CPT | Mod: PN | Performed by: FAMILY MEDICINE

## 2023-06-13 PROCEDURE — 83036 HEMOGLOBIN GLYCOSYLATED A1C: CPT | Performed by: FAMILY MEDICINE

## 2023-06-13 PROCEDURE — 83525 ASSAY OF INSULIN: CPT | Performed by: FAMILY MEDICINE

## 2023-06-13 PROCEDURE — 99999 PR PBB SHADOW E&M-EST. PATIENT-LVL IV: CPT | Mod: PBBFAC,,, | Performed by: FAMILY MEDICINE

## 2023-06-13 PROCEDURE — 3008F BODY MASS INDEX DOCD: CPT | Mod: CPTII,S$GLB,, | Performed by: FAMILY MEDICINE

## 2023-06-13 PROCEDURE — 3074F PR MOST RECENT SYSTOLIC BLOOD PRESSURE < 130 MM HG: ICD-10-PCS | Mod: CPTII,S$GLB,, | Performed by: FAMILY MEDICINE

## 2023-06-13 RX ORDER — SEMAGLUTIDE 0.25 MG/.5ML
0.25 INJECTION, SOLUTION SUBCUTANEOUS
Qty: 2 ML | Refills: 1 | Status: SHIPPED | OUTPATIENT
Start: 2023-06-13 | End: 2023-07-24

## 2023-06-13 NOTE — PATIENT INSTRUCTIONS
Meal Ideas for Regular Bariatric Diet/ or high protein diet plan from [facility]  *Recipes and products available at [email or web address].[email or web address]  Menu Plan: 1522-1177 Calories;  grams of Protein     DAY 1     Breakfast  ½ cup 2% cottage cheese (or  zero sugar oikos greek yogurt 6oz)  ¼ cup fruit (no sugar added)     Snack  2% mozzarella string cheese  10-20 grapes     Lunch  4-6oz Lean hamburger or turkey cheyenne  1 slice low-fat cheese  ¼-1/2 cup green beans     Snack  200 calorie low-carb protein drink (4 grams sugar or less)     Dinner  4oz chicken thigh/ breast  ¼-1 cup cooked spinach      Snack  Atkins bar (15g protein)        DAY 2     Breakfast  1 -2egg with 1oz shredded cheddar cheese and 2T salsa     Snack  200 calorie low-carb protein drink (4 grams sugar or less)     Lunch  Lettuce Wraps: 4oz sliced turkey, 1 slice low-fat Swiss cheese, tomato, and mustard wrapped in a Harsh lettuce leaf     Snack  ½ cup low-fat cottage cheese _(yogurt)  Pear cup (no sugar added)     Dinner  6oz baked fish  ½ cup cooked broccoli     Snack  Sugar-free pudding cup        DAY 3     Breakfast   ½ cup low-fat ricotta cheese w/ Splenda to be  ½ scoop Vanilla protein powder   ¼ cup fresh fruit     Snack  2% string cheese  20 unsalted almonds     Lunch  Tuna/Chicken Salad: 4-6oz canned tuna/chicken, 1 egg white, and 1 tsp light wiggins  Pineapple cup (no sugar added)     Snack  200 calorie low-carb protein drink (4 grams sugar or less)     Dinner  ½ -1baked pork chop   ¼ cup beans        DAY 4     Breakfast  200 calorie low-carb protein drink (4 grams sugar or less) ( premier protein)     Snack  Boiled egg 1-2     Lunch  ½-1 cup grilled shrimp  Salad w/ 2 tbsp crumbled fat-free feta  1 tbsp light vinaigrette     Snack  200 calorie low-carb protein drink (4 grams sugar or less)     Dinner  ¾ -1cup red beans     Snack  Mini Babybell light        DAY 5     Breakfast  [place] pie: 3oz Greek yogurt, 1 tbsp Splenda, ½  individual pack Crystal Light lemonade. Top with ¼ cup chopped walnuts      Snack  3-4 lean ham or turkey slices, ¼ - ½ cup fruit     Lunch  Fiesta Chicken: 2oz canned chicken, 1oz shredded cheddar cheese, ¼ cup black beans  Top with 2 tbsp salsa and a small dollop light sour cream     Snack  200 calorie low-carb protein drink (4 grams sugar or less)     Dinner  Omelette: ¼ cup Egg Beaters, 4 large (1oz) shrimp, 1oz shredded low-fat cheese. Add bell pepper, onion, mushrooms, green onions, or salsa, optional.        DAY 6     Breakfast  1 chang or 2 links turkey sausage  ½ cup fruit     Snack  200 calorie low-carb protein drink (4 grams sugar or less)     Lunch  Grilled tilapia  Salad of baby spinach leaves with light dressing     Snack  200 calorie low-carb protein drink (4 grams sugar or less)     Dinner  Chicken thigh simmered in 98% fat free cream of mushroom soup  ½ -1cup cooked green beans     Meal Ideas for Regular Bariatric Diet  *Recipes and products available at [email or web address].com        Breakfast: (15-20g protein)    - Egg white omelet: 2 egg whites or ½ cup Egg Beaters. (Optional proteins: cheese, shrimp, black beans, chicken, sliced turkey) (Optional veggies: tomatoes, salsa, spinach, mushrooms, onions, green peppers, or small slice avocado)     - Egg and sausage: 1 egg or ¼ cup Egg Beaters (any variety), with 1 chang or 2 links of Turkey sausage or Veggie breakfast sausage (Digitick or [employer] [place])     - Crust-less breakfast quiche: To make a glass pie dish, mix 4oz part skim Ricotta, 1 cup skim milk, and 2 eggs as your base. Add protein: shredded cheese, sliced lean ham or turkey, turkey grey/sausage. Add veggies: tomato, onion, green onion, mushroom, green pepper, spinach, etc.     - Yogurt parfait: Mix 1 - 6oz container Dannon Light N Fit vanilla yogurt, with ¼ cup crushed unsalted nuts    - Cottage cheese and fruit: ½ cup part-skim cottage cheese or ricotta cheese topped with fresh fruit or  sugar free preserves     - Marj Coleman's Vanilla Egg custard* (add 2 Tbsp instant coffee granules to make Cappuccino Custard*)    - Hi-Protein café latte (skim milk, decaf coffee, 1 scoop protein powder). Optional to add Sugar free syrup or extract flavoring.     - Breakfast Lox: spread fat free cream cheese on slices of smoked salmon. Serve over scrambled or egg over easy (sauteed with nonstick cookspray) OR on a cucumber slice     - Eggwhich: Scramble or cook 1 large egg over easy using nonstick cookspray. Place between 2 slices of [place] grey and low fat cheese.     Lunch: (20-30g protein)    - ½ cup Black bean soup (Homemade or Progresso), with ¼ cup shredded low-fat cheese. Top with chopped tomato or fresh salsa.     - Lean deli turkey breast and low-fat sliced cheese, mustard or light wiggins to moisten, rolled up together, or wrapped in a Harsh lettuce leaf    - Chicken salad made from dinner leftovers, moisten with low-fat salad dressing or light wiggins. Also try leftover salmon, shrimp, tuna or boiled eggs. Serve ½ cup over dark green salad     - Fat-free canned refried beans, topped with ¼ cup shredded low-fat cheese. Top with chopped tomato or fresh salsa.      - Greek salad: Top mixed greens with 1-2oz grilled chicken, tomatoes, red onions, 2-3 kalamata olives, and sprinkle lightly with feta cheese. Spritz with Balsamic vinegar to taste.      - Crust-less lunch quiche: To make a glass pie dish, mix 4oz part skim Ricotta, 1 cup skim milk, and 2 eggs as your base. Add protein: shredded cheese, sliced lean ham or turkey, shrimp, chicken. Add veggies: tomato, onion, green onion, mushroom, green pepper, spinach, artichoke, broccoli, etc.    - Pizza bake: spread a  german arabella mushroom with tomato sauce, low-fat shredded mozzarella and turkey pepperoni or [place] grey. Add any veggies. Roast for 10-15 minutes, until cheese melted.      - Cucumber crab bites: Spread ¼ cup crab dip (lump crabmeat + light cream  cheese and green onions) over sliced cucumber.      - Chicken with light spinach and artichoke dip*: Puree in : 6oz cooked and drained spinach, 2 cloves garlic, 1 can cannelloni beans, ½ cup chopped green onions, 1 can drained artichoke hearts (not marinated in oil), lemon juice and basil. Mix in 2oz chopped up chicken.     Supper: (20-30g protein)    - Serve grilled fish over dark green salad tossed with low-fat dressing, served with grilled asparagus spaulding      - Rotisserie chicken salad: served with sliced strawberries, walnuts, fat-free feta cheese crumbles and 1 tbsp Fengs Own Light Raspberry [place]gold Vinaigrette    - Shrimp cocktail: Dip cold boiled shrimp in homemade low-sugar cocktail sauce (1/2 cup Velia One Carb ketchup, 2 tbsp horseradish, 1/4 tsp hot sauce, 1 tsp Worcestershire sauce, 1 tbsp freshly-squeezed lemon juice). Serve with dark green salad, walnuts, and crumbled blue cheese drizzled with olive oil and Balsamic vinegar     - Tuna Melt: Spread tuna salad onto 2 thick slices of tomato. Top with low-fat cheese and broil until cheese is melted. May also be made with chicken salad of shrimp salad. [place] with different types of cheeses.     - Chicken or beef fajitas (no tortilla, rice, beans, chips). Top meat and veggies w/ fresh salsa, fat free sour cream.     - Homemade low-fat Chili using extra lean ground beef or ground turkey. Top with shredded cheese and salsa as desired. May add dollop fat-free sour cream if desired     - Chicken parmesan: Top chicken breast w/ low sugar marinara sauce, mozzarella cheese and bake until chicken reaches 165*.  Serve w/ spaghetti SQUASH or Yoruba cut green beans     - Dinner Omelet with shrimp or chicken and onion, green peppers and chives.     - No noodle lasagna: Use sliced zucchini or eggplant in place of noodles.  Layer with part skim ricotta cheese and low sugar meat sauce (use very lean ground beef or ground turkey).     - Mexican  chicken bake: Bake chunks of chicken breast or thigh with taco seasoning, Pace brand enchilada sauce, green onions and low-fat cheese. Serve with ¼ cup black beans or fat free refried beans topped with chopped tomatoes or salsa.    - Octavio frozen meatballs, simmered in Classico Marinara sauce. Different flavors of salsa or spaghetti sauce create different dishes! Sprinkle with parmesan cheese. Serve with grilled or steamed veggies, or a dark green salad.    - Simmer boneless skinless chicken thigh chunks in Classico Marinara sauce or roasted salsa until tender with chopped onion, bell pepper, garlic, mushrooms, spinach, etc.     - Hamburger or veggie burger, without the bun, dressed the way you like. Served with grilled or steamed veggies.     - Eggplant parmesan: Bake slices of eggplant at 350 degrees for 15 minutes. Layer tomato sauce, sliced eggplant and low-fat mozzarella cheese in a baking dish and cover with foil. Bake 30-40 more minutes or until bubbly. Uncover and bake at 400 degrees for about 15 more minutes, or until top is slightly crisp.     - Fish tacos: grilled/baked white fish, wrapped in Harsh lettuce leaf, topped with salsa, shredded low-fat cheese, and light coleslaw.     - Chicken eric: Sprinkle chicken w/ 1 tsp of hidden valley ranch dip mix. Then grill chicken and top with black beans, salsa and 1 tsp fat free sour cream.      - Cauliflower pizza crust: Use cauliflower as crust (see recipe on pinterest, no flour!). Top w/ low fat cheese, turkey pepperoni and veggies and bake again     - chicken or turkey crust pizza: use ground chicken or turkey instead of cauliflower, spread in [place] and bake at 350 for about 20-30 minutes(may want to add garlic, black pepper, oregano and other herbs to ground meat mixture).  Remove and top w/ low fat cheese, turkey pepperoni and veggies and bake again for another 10 minutes or until cheese is browned.      Snacks: (100-200 calories; >5g protein)      - 1 low-fat cheese stick with 8 cherry tomatoes or 1 serving fresh fruit  - 4 thin slices fat-free turkey breast and 1 slice low-fat cheese  - 4 thin slices fat-free honey ham with wedge of melon  - 6-8 edamame pods (equivalent to about 1/4 cup edamame without pods).   - 1/4 cup unsalted nuts with ½ cup fruit  - 6-oz container Dannon Light n Fit vanilla yogurt, topped with 1oz unsalted nuts         - apple, celery or baby carrots spread with 2 Tbsp PB2  - apple slices with 1 oz slice low-fat cheese  - Apple slices dipped in 2 Tbsp of PB2  - celery, cucumber, bell pepper or baby carrots dipped in ¼ cup hummus bean spread or light spinach and artichoke dip (*recipe in lunch section)  - celery, cucumber, baby carrots dipped in high protein greek yogurt (Mix 16 oz plain greek yogurt + 1 packet of hidden valley ranch dip mix)  - Gary Links Beef Steak - 14g protein! (similar to beef jerky)  - 2 wedges Laughing Cow - Light Herb & Garlic Cheese with sliced cucumber or green bell pepper  - 1/2 cup low-fat cottage cheese with ¼ cup fruit or ¼ cup salsa  - RTD Protein drinks: Atkins, Low Carb Slim Fast, EAS light, Muscle Milk Light, etc.  - Homemade Protein drinks: GNC Soy95, Isopure, Nectar, UNJURY, Whey Gourmet, etc. Mix 1 scoop powder with 8oz skim/1% milk or light soymilk.  - Protein bars: Atkins, EAS, Pure Protein, Think Thin, Detour, etc. Must have 0-4 grams sugar - Read the label.     Takeout Options: No more than twice/week  Deli - Salads (no pasta or rice), meats, cheeses. Roasted chicken. Lox (salmon)     Mexican - Platters which don't include tortillas, chips, or rice. Go easy on the beans. Example: Fajitas without the tortillas. Ask the  not to bring chips to the table if they are too tempting.     Greek - Meat or fish and vegetable, but no bread or rice. Including hummus, baba ganoush, etc, is OK. Most sit-down Greek restaurants can provide you with cucumber slices for dipping instead of theresa bread.      Fast Food (Avoid as much as possible) - Salads (no croutons and limit salad dressing to 2 tbsp), grilled chicken sandwich without the bun and ask for no wiggins. Heydis low fat chili or Taco Bell pintos and cheese.     BBQ - The meats are fine if you ask for sauces on the side, but most of the traditional side dishes are loaded with carbs. Kali slaw, baked beans and BBQ sauce are typically made with sugar.     Chinese - Nothing deep-fried, no rice or noodles. Many Chinese sauces have starch and sugar in them, so you'll have to use your judgement. If you find that these sauces trigger cravings, or cause Dumping, you can ask for the sauce to be made without sugar or just use soy sauce.

## 2023-06-13 NOTE — Clinical Note
Lilo, I saw our mutual patient. I'm going to get labs, u/s and fibroscan on liver. She has hx of hepatic steatosis and hepatomegaly. With more weight gain, going to see if wegovy approved for her ( sometimes have luck with [facility] plans on this version). Might be best to get her off the combined estrogen/progesterone pill and switch to progesterone only due to liver concerns, but I'll leave that up to you. I'll forward you the results. Thanks so much!  Love Reynolds MD

## 2023-06-13 NOTE — PROGRESS NOTES
Subjective:      Patient ID: Charlotte Patricia is a 28 y.o. female.    Chief Complaint: Annual Exam      Patient is a 28 y.o. female coming in today for annual exam. Last time I saw pt was on November 2021.  She had cholecystectomy since last visit. Has been f/u with OBGYN. States she continued to have intermittent abd pain; sx resolved after she was instructed by OBGYN to take birth control more continuously. Pt states she has had more constant acid reflux since surgery. Sx appear even when she does not eat. Pt is requesting hormone lab work due to recent onset of weight gain and fatigue. States she has had feet swelling for the past few weeks.       1. Routine general medical examination at a health care facility    2. Chronic RUQ pain    3. Fatigue, unspecified type    4. Abnormal weight gain    5. Decreased libido    6. Fatty liver    7. Class 2 severe obesity due to excess calories with serious comorbidity and body mass index (BMI) of 36.0 to 36.9 in adult       No questionnaires on file.    Pmh, Psh, Family Hx, Social Hx, HM updated in Epic Tabs today.   Review of Systems   Constitutional:  Positive for fatigue and unexpected weight change. Negative for chills and fever.   HENT:  Negative for ear pain and trouble swallowing.    Eyes:  Negative for pain and visual disturbance.   Respiratory:  Negative for cough and shortness of breath.    Cardiovascular:  Negative for chest pain and leg swelling.   Gastrointestinal:  Negative for abdominal pain, blood in stool, nausea and vomiting.   Endocrine: Negative for cold intolerance and heat intolerance.   Genitourinary:  Negative for dysuria and frequency.   Musculoskeletal:  Negative for joint swelling, myalgias and neck pain.        + feet swelling   Skin:  Negative for color change and rash.   Neurological:  Negative for dizziness and headaches.   Psychiatric/Behavioral:  Negative for behavioral problems and sleep disturbance.    Objective:     Vitals:     "06/13/23 0857   BP: 108/72   Pulse: 96   Temp: 97.6 °F (36.4 °C)   TempSrc: Oral   SpO2: 96%   Weight: 101.6 kg (223 lb 15.8 oz)   Height: 5' 6" (1.676 m)     Wt Readings from Last 10 Encounters:   06/13/23 101.6 kg (223 lb 15.8 oz)   06/13/22 92.5 kg (204 lb)   05/16/22 92.5 kg (204 lb)   02/02/22 89.9 kg (198 lb 3.1 oz)   01/24/22 89.6 kg (197 lb 8.5 oz)   01/07/22 90.4 kg (199 lb 3 oz)   12/20/21 92.2 kg (203 lb 4.2 oz)   12/17/21 91.2 kg (201 lb 1 oz)   10/08/21 89.9 kg (198 lb 3.1 oz)   09/29/21 89.6 kg (197 lb 8.5 oz)     Physical Exam  Vitals reviewed.   Constitutional:       Appearance: Normal appearance. She is well-developed and normal weight.   HENT:      Head: Normocephalic and atraumatic.      Right Ear: Tympanic membrane and external ear normal.      Left Ear: Tympanic membrane and external ear normal.      Nose: Nose normal.      Mouth/Throat:      Mouth: Mucous membranes are moist.      Pharynx: Oropharynx is clear.   Eyes:      Conjunctiva/sclera: Conjunctivae normal.      Pupils: Pupils are equal, round, and reactive to light.   Neck:      Thyroid: No thyromegaly.   Cardiovascular:      Rate and Rhythm: Normal rate and regular rhythm.      Heart sounds: Normal heart sounds. No murmur heard.    No friction rub. No gallop.   Pulmonary:      Effort: Pulmonary effort is normal. No respiratory distress.      Breath sounds: Normal breath sounds. No wheezing or rales.   Abdominal:      General: Bowel sounds are normal. There is no distension.      Palpations: Abdomen is soft.      Tenderness: There is no abdominal tenderness. There is no rebound.   Musculoskeletal:         General: Normal range of motion.      Cervical back: Normal range of motion and neck supple.   Lymphadenopathy:      Cervical: No cervical adenopathy.   Skin:     General: Skin is warm and dry.      Findings: No rash.   Neurological:      Mental Status: She is alert and oriented to person, place, and time.   Psychiatric:         " Attention and Perception: Attention and perception normal.         Mood and Affect: Mood and affect normal.         Speech: Speech normal.         Behavior: Behavior normal.         Thought Content: Thought content normal.         Cognition and Memory: Cognition and memory normal.         Judgment: Judgment normal.       Assessment:     1. Routine general medical examination at a health care facility    2. Chronic RUQ pain    3. Fatigue, unspecified type    4. Abnormal weight gain    5. Decreased libido    6. Fatty liver    7. Class 2 severe obesity due to excess calories with serious comorbidity and body mass index (BMI) of 36.0 to 36.9 in adult        Plan:   Charlotte was seen today for annual exam.    Diagnoses and all orders for this visit:    Routine general medical examination at a health care facility  -     TSH; Future  -     T4, Free; Future  -     Lipid Panel; Future  -     Hemoglobin A1C; Future  -     Comprehensive Metabolic Panel; Future  -     CBC Auto Differential; Future  -     Insulin, Random; Future  -     Vitamin D; Future  -     Protime-INR; Future  -     Gamma GT; Future    Chronic RUQ pain  -     TSH; Future  -     T4, Free; Future  -     Lipid Panel; Future  -     Hemoglobin A1C; Future  -     Comprehensive Metabolic Panel; Future  -     CBC Auto Differential; Future  -     Insulin, Random; Future  -     Vitamin D; Future  -     Protime-INR; Future  -     Gamma GT; Future  -     US Abdomen Complete; Future  -     FibroScan (Vibration Controlled Transient Elastography); Future  -     Ambulatory referral/consult to Hepatology; Future    Fatigue, unspecified type  -     TSH; Future  -     T4, Free; Future  -     Lipid Panel; Future  -     Hemoglobin A1C; Future  -     Comprehensive Metabolic Panel; Future  -     CBC Auto Differential; Future  -     Insulin, Random; Future  -     Vitamin D; Future    Abnormal weight gain  -     TSH; Future  -     T4, Free; Future  -     Lipid Panel; Future  -      Hemoglobin A1C; Future  -     Comprehensive Metabolic Panel; Future  -     CBC Auto Differential; Future  -     Insulin, Random; Future  -     Vitamin D; Future  -     Protime-INR; Future  -     Gamma GT; Future    Decreased libido  -     TSH; Future  -     T4, Free; Future  -     Lipid Panel; Future  -     Hemoglobin A1C; Future  -     Comprehensive Metabolic Panel; Future  -     CBC Auto Differential; Future  -     Insulin, Random; Future  -     Vitamin D; Future    Fatty liver  -     Protime-INR; Future  -     Gamma GT; Future  -     US Abdomen Complete; Future  -     FibroScan (Vibration Controlled Transient Elastography); Future  -     Ambulatory referral/consult to Hepatology; Future  -     semaglutide, weight loss, (WEGOVY) 0.25 mg/0.5 mL PnIj; Inject 0.25 mg into the skin every 7 days.    Class 2 severe obesity due to excess calories with serious comorbidity and body mass index (BMI) of 36.0 to 36.9 in adult  -     US Abdomen Complete; Future  -     FibroScan (Vibration Controlled Transient Elastography); Future  -     Ambulatory referral/consult to Hepatology; Future  -     semaglutide, weight loss, (WEGOVY) 0.25 mg/0.5 mL PnIj; Inject 0.25 mg into the skin every 7 days.      RUQ pain and fatty liver are not controlled at this time.   Liver US and fiber scan ordered to be completed this week.  Advised on Mediterranean diet and increasing exercise for fatty liver treatment.   Referral given to hepatology for further fatty liver treatment.   Elevated BMI is not controlled at this time.   New Rx Wegovy 0.25 mg/week for elevated BMI treatment.   Lab work ordered to be completed this week.   Pending results, would recommend transitioning form estrogen-progesterone based birth control to only estrogen based birth control for liver concerns.   Instructed to f/u with MATHIEU in 1 month.     Patient Instructions     Meal Ideas for Regular Bariatric Diet/ or high protein diet plan from [facility]  *Recipes and  products available at [email or web address]  Menu Plan: 7158-9164 Calories;  grams of Protein     DAY 1     Breakfast  ½ cup 2% cottage cheese (or  zero sugar oikos greek yogurt 6oz)  ¼ cup fruit (no sugar added)     Snack  2% mozzarella string cheese  10-20 grapes     Lunch  4-6oz Lean hamburger or turkey cheyenne  1 slice low-fat cheese  ¼-1/2 cup green beans     Snack  200 calorie low-carb protein drink (4 grams sugar or less)     Dinner  4oz chicken thigh/ breast  ¼-1 cup cooked spinach      Snack  Atkins bar (15g protein)        DAY 2     Breakfast  1 -2egg with 1oz shredded cheddar cheese and 2T salsa     Snack  200 calorie low-carb protein drink (4 grams sugar or less)     Lunch  Lettuce Wraps: 4oz sliced turkey, 1 slice low-fat Swiss cheese, tomato, and mustard wrapped in a Harsh lettuce leaf     Snack  ½ cup low-fat cottage cheese _(yogurt)  Pear cup (no sugar added)     Dinner  6oz baked fish  ½ cup cooked broccoli     Snack  Sugar-free pudding cup        DAY 3     Breakfast   ½ cup low-fat ricotta cheese w/ Splenda to be  ½ scoop Vanilla protein powder   ¼ cup fresh fruit     Snack  2% string cheese  20 unsalted almonds     Lunch  Tuna/Chicken Salad: 4-6oz canned tuna/chicken, 1 egg white, and 1 tsp light wiggins  Pineapple cup (no sugar added)     Snack  200 calorie low-carb protein drink (4 grams sugar or less)     Dinner  ½ -1baked pork chop   ¼ cup beans        DAY 4     Breakfast  200 calorie low-carb protein drink (4 grams sugar or less) ( premier protein)     Snack  Boiled egg 1-2     Lunch  ½-1 cup grilled shrimp  Salad w/ 2 tbsp crumbled fat-free feta  1 tbsp light vinaigrette     Snack  200 calorie low-carb protein drink (4 grams sugar or less)     Dinner  ¾ -1cup red beans     Snack  Mini Babybell light        DAY 5     Breakfast  [place] pie: 3oz Greek yogurt, 1 tbsp Splenda, ½ individual pack Crystal Light lemonade. Top with ¼ cup chopped walnuts      Snack  3-4 lean ham or turkey  slices, ¼ - ½ cup fruit     Lunch  Fiesta Chicken: 2oz canned chicken, 1oz shredded cheddar cheese, ¼ cup black beans  Top with 2 tbsp salsa and a small dollop light sour cream     Snack  200 calorie low-carb protein drink (4 grams sugar or less)     Dinner  Omelette: ¼ cup Egg Beaters, 4 large (1oz) shrimp, 1oz shredded low-fat cheese. Add bell pepper, onion, mushrooms, green onions, or salsa, optional.        DAY 6     Breakfast  1 chang or 2 links turkey sausage  ½ cup fruit     Snack  200 calorie low-carb protein drink (4 grams sugar or less)     Lunch  Grilled tilapia  Salad of baby spinach leaves with light dressing     Snack  200 calorie low-carb protein drink (4 grams sugar or less)     Dinner  Chicken thigh simmered in 98% fat free cream of mushroom soup  ½ -1cup cooked green beans     Meal Ideas for Regular Bariatric Diet  *Recipes and products available at [email or web address]        Breakfast: (15-20g protein)    - Egg white omelet: 2 egg whites or ½ cup Egg Beaters. (Optional proteins: cheese, shrimp, black beans, chicken, sliced turkey) (Optional veggies: tomatoes, salsa, spinach, mushrooms, onions, green peppers, or small slice avocado)     - Egg and sausage: 1 egg or ¼ cup Egg Beaters (any variety), with 1 chang or 2 links of Turkey sausage or Veggie breakfast sausage (Within3 or Keypr)     - Crust-less breakfast quiche: To make a glass pie dish, mix 4oz part skim Ricotta, 1 cup skim milk, and 2 eggs as your base. Add protein: shredded cheese, sliced lean ham or turkey, turkey grey/sausage. Add veggies: tomato, onion, green onion, mushroom, green pepper, spinach, etc.     - Yogurt parfait: Mix 1 - 6oz container Dannon Light N Fit vanilla yogurt, with ¼ cup crushed unsalted nuts    - Cottage cheese and fruit: ½ cup part-skim cottage cheese or ricotta cheese topped with fresh fruit or sugar free preserves     - Marj Coleman's Vanilla Egg custard* (add 2 Tbsp instant coffee granules to make  Cappuccino Custard*)    - Hi-Protein café latte (skim milk, decaf coffee, 1 scoop protein powder). Optional to add Sugar free syrup or extract flavoring.     - Breakfast Lox: spread fat free cream cheese on slices of smoked salmon. Serve over scrambled or egg over easy (sauteed with nonstick cookspray) OR on a cucumber slice     - Eggwhich: Scramble or cook 1 large egg over easy using nonstick cookspray. Place between 2 slices of Burmese grey and low fat cheese.     Lunch: (20-30g protein)    - ½ cup Black bean soup (Homemade or Progresso), with ¼ cup shredded low-fat cheese. Top with chopped tomato or fresh salsa.     - Lean deli turkey breast and low-fat sliced cheese, mustard or light wiggins to moisten, rolled up together, or wrapped in a Harsh lettuce leaf    - Chicken salad made from dinner leftovers, moisten with low-fat salad dressing or light wiggins. Also try leftover salmon, shrimp, tuna or boiled eggs. Serve ½ cup over dark green salad     - Fat-free canned refried beans, topped with ¼ cup shredded low-fat cheese. Top with chopped tomato or fresh salsa.      - Greek salad: Top mixed greens with 1-2oz grilled chicken, tomatoes, red onions, 2-3 kalamata olives, and sprinkle lightly with feta cheese. Spritz with Balsamic vinegar to taste.      - Crust-less lunch quiche: To make a glass pie dish, mix 4oz part skim Ricotta, 1 cup skim milk, and 2 eggs as your base. Add protein: shredded cheese, sliced lean ham or turkey, shrimp, chicken. Add veggies: tomato, onion, green onion, mushroom, green pepper, spinach, artichoke, broccoli, etc.    - Pizza bake: spread a  german arabella mushroom with tomato sauce, low-fat shredded mozzarella and turkey pepperoni or Peach grey. Add any veggies. Roast for 10-15 minutes, until cheese melted.      - Cucumber crab bites: Spread ¼ cup crab dip (lump crabmeat + light cream cheese and green onions) over sliced cucumber.      - Chicken with light spinach and artichoke dip*:  Puree in : 6oz cooked and drained spinach, 2 cloves garlic, 1 can cannelloni beans, ½ cup chopped green onions, 1 can drained artichoke hearts (not marinated in oil), lemon juice and basil. Mix in 2oz chopped up chicken.     Supper: (20-30g protein)    - Serve grilled fish over dark green salad tossed with low-fat dressing, served with grilled asparagus spaulding      - Rotisserie chicken salad: served with sliced strawberries, walnuts, fat-free feta cheese crumbles and 1 tbsp Fengs Own Light Raspberry [place] Vinaigrette    - Shrimp cocktail: Dip cold boiled shrimp in homemade low-sugar cocktail sauce (1/2 cup Velia One Carb ketchup, 2 tbsp horseradish, 1/4 tsp hot sauce, 1 tsp Worcestershire sauce, 1 tbsp freshly-squeezed lemon juice). Serve with dark green salad, walnuts, and crumbled blue cheese drizzled with olive oil and Balsamic vinegar     - Tuna Melt: Spread tuna salad onto 2 thick slices of tomato. Top with low-fat cheese and broil until cheese is melted. May also be made with chicken salad of shrimp salad. [place] with different types of cheeses.     - Chicken or beef fajitas (no tortilla, rice, beans, chips). Top meat and veggies w/ fresh salsa, fat free sour cream.     - Homemade low-fat Chili using extra lean ground beef or ground turkey. Top with shredded cheese and salsa as desired. May add dollop fat-free sour cream if desired     - Chicken parmesan: Top chicken breast w/ low sugar marinara sauce, mozzarella cheese and bake until chicken reaches 165*.  Serve w/ spaghetti SQUASH or Indian cut green beans     - Dinner Omelet with shrimp or chicken and onion, green peppers and chives.     - No noodle lasagna: Use sliced zucchini or eggplant in place of noodles.  Layer with part skim ricotta cheese and low sugar meat sauce (use very lean ground beef or ground turkey).     - Mexican chicken bake: Bake chunks of chicken breast or thigh with taco seasoning, Pace brand enchilada sauce,  green onions and low-fat cheese. Serve with ¼ cup black beans or fat free refried beans topped with chopped tomatoes or salsa.    - Octavio frozen meatballs, simmered in Classico Marinara sauce. Different flavors of salsa or spaghetti sauce create different dishes! Sprinkle with parmesan cheese. Serve with grilled or steamed veggies, or a dark green salad.    - Simmer boneless skinless chicken thigh chunks in Classico Marinara sauce or roasted salsa until tender with chopped onion, bell pepper, garlic, mushrooms, spinach, etc.     - Hamburger or veggie burger, without the bun, dressed the way you like. Served with grilled or steamed veggies.     - Eggplant parmesan: Bake slices of eggplant at 350 degrees for 15 minutes. Layer tomato sauce, sliced eggplant and low-fat mozzarella cheese in a baking dish and cover with foil. Bake 30-40 more minutes or until bubbly. Uncover and bake at 400 degrees for about 15 more minutes, or until top is slightly crisp.     - Fish tacos: grilled/baked white fish, wrapped in Harsh lettuce leaf, topped with salsa, shredded low-fat cheese, and light coleslaw.     - Chicken eric: Sprinkle chicken w/ 1 tsp of hidden valley ranch dip mix. Then grill chicken and top with black beans, salsa and 1 tsp fat free sour cream.      - Cauliflower pizza crust: Use cauliflower as crust ([facility] recipe [facility], no flour!). Top w/ low fat cheese, turkey pepperoni and veggies and bake again     - chicken or turkey crust pizza: use ground chicken or turkey instead of cauliflower, spread in [place] and bake at 350 for about 20-30 minutes(may want to add garlic, black pepper, oregano and other herbs to ground meat mixture).  Remove and top w/ low fat cheese, turkey pepperoni and veggies and bake again for another 10 minutes or until cheese is browned.      Snacks: (100-200 calories; >5g protein)     - 1 low-fat cheese stick with 8 cherry tomatoes or 1 serving fresh fruit  - 4 thin slices fat-free  turkey breast and 1 slice low-fat cheese  - 4 thin slices fat-free honey ham with wedge of melon  - 6-8 edamame pods (equivalent to about 1/4 cup edamame without pods).   - 1/4 cup unsalted nuts with ½ cup fruit  - 6-oz container Dannon Light n Fit vanilla yogurt, topped with 1oz unsalted nuts         - apple, celery or baby carrots spread with 2 Tbsp PB2  - apple slices with 1 oz slice low-fat cheese  - Apple slices dipped in 2 Tbsp of PB2  - celery, cucumber, bell pepper or baby carrots dipped in ¼ cup hummus bean spread or light spinach and artichoke dip (*recipe in lunch section)  - celery, cucumber, baby carrots dipped in high protein greek yogurt (Mix 16 oz plain greek yogurt + 1 packet of hidden valley ranch dip mix)  - Gary Links Beef Steak - 14g protein! (similar to beef jerky)  - 2 wedges Laughing Cow - Light Herb & Garlic Cheese with sliced cucumber or green bell pepper  - 1/2 cup low-fat cottage cheese with ¼ cup fruit or ¼ cup salsa  - RTD Protein drinks: Atkins, Low Carb Slim Fast, EAS light, Muscle Milk Light, etc.  - Homemade Protein drinks: GNC Soy95, Isopure, Nectar, UNJURY, Whey Gourmet, etc. Mix 1 scoop powder with 8oz skim/1% milk or light soymilk.  - Protein bars: Atkins, EAS, Pure Protein, Think Thin, Detour, etc. Must have 0-4 grams sugar - Read the label.     Takeout Options: No more than twice/week  Deli - Salads (no pasta or rice), meats, cheeses. Roasted chicken. Lox (salmon)     Mexican - Platters which don't include tortillas, chips, or rice. Go easy on the beans. Example: Fajitas without the tortillas. Ask the  not to bring chips to the table if they are too tempting.     Greek - Meat or fish and vegetable, but no bread or rice. Including hummus, baba ganoush, etc, is OK. Most sit-down Greek restaurants can provide you with cucumber slices for dipping instead of theresa bread.     Fast Food (Avoid as much as possible) - Salads (no croutons and limit salad dressing to 2 tbsp),  grilled chicken sandwich without the bun and ask for no wiggins. Heydis low fat chili or Taco Bell pintos and cheese.     BBQ - The meats are fine if you ask for sauces on the side, but most of the traditional side dishes are loaded with carbs. Kali slaw, baked beans and BBQ sauce are typically made with sugar.     Chinese - Nothing deep-fried, no rice or noodles. Many Chinese sauces have starch and sugar in them, so you'll have to use your judgement. If you find that these sauces trigger cravings, or cause Dumping, you can ask for the sauce to be made without sugar or just use soy sauce.     Follow up in about 1 month (around 7/13/2023) for f/u MATHIEU Boy Vázquez NP/ review liver/ wegovy.      LABS:   No results found for: HGBA1C   No results found for: CHOL  No results found for: LDLCALC  Lab Results   Component Value Date    WBC 8.48 12/27/2021    HGB 13.6 12/27/2021    HCT 42.6 12/27/2021     12/27/2021    ALT 70 (H) 12/27/2021    AST 38 12/27/2021     12/27/2021    K 4.4 12/27/2021     12/27/2021    CREATININE 1.0 12/27/2021    BUN 9 12/27/2021    CO2 30 (H) 12/27/2021    TSH 1.756 09/29/2021     45 minutes of total time spent on the encounter, which includes face to face time and non-face to face time preparing to see the patient (eg, review of tests), Obtaining and/or reviewing separately obtained history, Documenting clinical information in the electronic or other health record, Independently interpreting results (not separately reported) and communicating results to the patient/family/caregiver, or Care coordination (not separately reported).      Scribe Attestation:   I, Juan Medina, am scribing for, and in the presence of, Dr. Love Reynolds MD. I performed the above scribed service and the documentation accurately describes the services I performed. I attest to the accuracy of the note.    I, Dr. Love Reynolds MD, reviewed documentation as scribed above. I personally performed the  services described in this documentation.  I agree that the record reflects my personal performance and is accurate and complete. Love Reynolds MD.    06/13/2023

## 2023-06-14 LAB — 25(OH)D3+25(OH)D2 SERPL-MCNC: 36 NG/ML (ref 30–96)

## 2023-06-16 ENCOUNTER — HOSPITAL ENCOUNTER (OUTPATIENT)
Dept: RADIOLOGY | Facility: HOSPITAL | Age: 28
Discharge: HOME | End: 2023-06-16
Attending: FAMILY MEDICINE
Payer: COMMERCIAL

## 2023-06-16 DIAGNOSIS — G89.29 CHRONIC RUQ PAIN: ICD-10-CM

## 2023-06-16 DIAGNOSIS — E66.01 CLASS 2 SEVERE OBESITY DUE TO EXCESS CALORIES WITH SERIOUS COMORBIDITY AND BODY MASS INDEX (BMI) OF 36.0 TO 36.9 IN ADULT: ICD-10-CM

## 2023-06-16 DIAGNOSIS — R10.11 CHRONIC RUQ PAIN: ICD-10-CM

## 2023-06-16 DIAGNOSIS — K76.0 FATTY LIVER: ICD-10-CM

## 2023-06-16 PROCEDURE — 76700 US EXAM ABDOM COMPLETE: CPT | Mod: 26,,, | Performed by: RADIOLOGY

## 2023-06-16 PROCEDURE — 76700 US ABDOMEN COMPLETE: ICD-10-PCS | Mod: 26,,, | Performed by: RADIOLOGY

## 2023-06-16 PROCEDURE — 76700 US EXAM ABDOM COMPLETE: CPT | Mod: TC

## 2023-06-18 ENCOUNTER — OFFICE VISIT (OUTPATIENT)
Dept: URGENT CARE | Facility: CLINIC | Age: 28
End: 2023-06-18
Payer: COMMERCIAL

## 2023-06-18 VITALS
SYSTOLIC BLOOD PRESSURE: 134 MMHG | TEMPERATURE: 98 F | BODY MASS INDEX: 35.68 KG/M2 | WEIGHT: 222 LBS | RESPIRATION RATE: 18 BRPM | DIASTOLIC BLOOD PRESSURE: 84 MMHG | HEIGHT: 66 IN | HEART RATE: 92 BPM | OXYGEN SATURATION: 96 %

## 2023-06-18 DIAGNOSIS — J02.0 STREP PHARYNGITIS: Primary | ICD-10-CM

## 2023-06-18 DIAGNOSIS — J02.9 SORE THROAT: ICD-10-CM

## 2023-06-18 LAB
CTP QC/QA: YES
MOLECULAR STREP A: POSITIVE

## 2023-06-18 PROCEDURE — 87651 POCT STREP A MOLECULAR: ICD-10-PCS | Mod: QW,S$GLB,, | Performed by: PHYSICIAN ASSISTANT

## 2023-06-18 PROCEDURE — 87651 STREP A DNA AMP PROBE: CPT | Mod: QW,S$GLB,, | Performed by: PHYSICIAN ASSISTANT

## 2023-06-18 PROCEDURE — 99213 PR OFFICE/OUTPT VISIT, EST, LEVL III, 20-29 MIN: ICD-10-PCS | Mod: S$GLB,,, | Performed by: PHYSICIAN ASSISTANT

## 2023-06-18 PROCEDURE — 99213 OFFICE O/P EST LOW 20 MIN: CPT | Mod: S$GLB,,, | Performed by: PHYSICIAN ASSISTANT

## 2023-06-18 RX ORDER — AMOXICILLIN 500 MG/1
500 CAPSULE ORAL EVERY 12 HOURS
Qty: 20 CAPSULE | Refills: 0 | Status: SHIPPED | OUTPATIENT
Start: 2023-06-18 | End: 2023-06-28

## 2023-06-18 NOTE — PROGRESS NOTES
"Subjective:      Patient ID: Charlotte Patricia is a 28 y.o. female.    Vitals:  height is 5' 6" (1.676 m) and weight is 100.7 kg (222 lb). Her oral temperature is 98.3 °F (36.8 °C). Her blood pressure is 134/84 and her pulse is 92. Her respiration is 18 and oxygen saturation is 96%.     Chief Complaint: Sore Throat (Started on Tuesday)    Patient has had a sore throat since Tuesday. She is complaining of trouble swallowing but no fever.  Possible exposure from a family member who was sick last week.    Sore Throat   This is a new problem. The current episode started in the past 7 days. The problem has been gradually worsening. The pain is worse on the right side. There has been no fever. The pain is at a severity of 7/10. The pain is moderate. Associated symptoms include coughing, headaches, a plugged ear sensation and trouble swallowing. Pertinent negatives include no abdominal pain, congestion, diarrhea, drooling, ear discharge, ear pain, hoarse voice, neck pain, shortness of breath, stridor, swollen glands or vomiting. She has had no exposure to strep or mono. She has tried NSAIDs (ibuprofen) for the symptoms. The treatment provided no relief.     Constitution: Negative for fever.   HENT:  Positive for sore throat and trouble swallowing. Negative for ear pain, ear discharge, drooling and congestion.    Neck: Negative for neck pain.   Respiratory:  Positive for cough. Negative for shortness of breath and stridor.    Gastrointestinal:  Negative for abdominal pain, vomiting and diarrhea.   Neurological:  Positive for headaches.    Objective:     Physical Exam   Constitutional: She is oriented to person, place, and time. She appears well-developed.   HENT:   Head: Normocephalic and atraumatic.   Ears:   Right Ear: External ear normal.   Left Ear: External ear normal.   Nose: Nose normal.   Mouth/Throat: Mucous membranes are normal. Mucous membranes are moist. Posterior oropharyngeal erythema present. Tonsils are " 2+ on the right. Tonsils are 2+ on the left. Tonsillar exudate.   Eyes: Conjunctivae and EOM are normal. Pupils are equal, round, and reactive to light.   Neck: Neck supple.   Cardiovascular: Normal rate, regular rhythm, normal heart sounds and normal pulses.   Pulmonary/Chest: Effort normal and breath sounds normal.   Musculoskeletal: Normal range of motion.         General: Normal range of motion.   Neurological: She is alert and oriented to person, place, and time.   Skin: Skin is warm and dry.   Vitals reviewed.    Assessment:     1. Strep pharyngitis    2. Sore throat        Plan:       Strep pharyngitis  -     amoxicillin (AMOXIL) 500 MG capsule; Take 1 capsule (500 mg total) by mouth every 12 (twelve) hours. for 10 days  Dispense: 20 capsule; Refill: 0    Sore throat  -     POCT Strep A, Molecular      Results for orders placed or performed in visit on 06/18/23   POCT Strep A, Molecular   Result Value Ref Range    Molecular Strep A, POC Positive (A) Negative     Acceptable Yes          Plan:     Antibiotics sent to pharmacy.  Replace toothbrush.  Contagious for 24 hours after starting antibiotics.  Tylenol and Ibuprofen for fever and discomfort.  Salt water gargles for sore throat relief.  Follow up with primary care physician in 1 week if symptoms do not resolve.  Report to ER with new or worsening symptoms.  Red flags include muffled voices, swelling in back of the throat, fever uncontrolled, etc.

## 2023-06-20 ENCOUNTER — TELEPHONE (OUTPATIENT)
Dept: PRIMARY CARE CLINIC | Facility: CLINIC | Age: 28
End: 2023-06-20
Payer: COMMERCIAL

## 2023-06-20 NOTE — TELEPHONE ENCOUNTER
----- Message from Susan Bruno sent at 6/20/2023  7:49 AM CDT -----  Contact: [phone]  Pt is calling regarding test results . Please call back at [phone] . thanksdj

## 2023-06-21 ENCOUNTER — TELEPHONE (OUTPATIENT)
Dept: URGENT CARE | Facility: CLINIC | Age: 28
End: 2023-06-21
Payer: COMMERCIAL

## 2023-06-21 NOTE — TELEPHONE ENCOUNTER
Courtesy call was made. Patient stated that she is feeling better. She did not have any questions or concerns.

## 2023-06-22 NOTE — PROGRESS NOTES
Just FYI. Seeing you in July and seeing Liver Team on 6/27/23.   Ultrasound does show fatty liver changes along with liver and spleen enlargement.  There is a question of some changes to the kidneys due to either also higher blood pressure readings over time which would also improved with diet exercise and weight loss.  We can follow this up with my primary care partner and discuss in more detail at your upcoming visits.  Love Reynolds MD

## 2023-06-23 ENCOUNTER — TELEPHONE (OUTPATIENT)
Dept: PRIMARY CARE CLINIC | Facility: CLINIC | Age: 28
End: 2023-06-23
Payer: COMMERCIAL

## 2023-06-23 NOTE — TELEPHONE ENCOUNTER
----- Message from Ena Blankenship sent at 6/23/2023  9:51 AM CDT -----  Contact: jose  Patient is calling to speak with the nurse regarding questions and concerns. Reports needing to know have the fibro scan has been ordered. Please give the patient a call back at [phone]   Thanks [email or web address]

## 2023-06-27 ENCOUNTER — OFFICE VISIT (OUTPATIENT)
Dept: HEPATOLOGY | Facility: CLINIC | Age: 28
End: 2023-06-27
Payer: COMMERCIAL

## 2023-06-27 ENCOUNTER — LAB VISIT (OUTPATIENT)
Dept: LAB | Facility: HOSPITAL | Age: 28
End: 2023-06-27
Attending: FAMILY MEDICINE
Payer: COMMERCIAL

## 2023-06-27 VITALS
BODY MASS INDEX: 35.19 KG/M2 | SYSTOLIC BLOOD PRESSURE: 126 MMHG | WEIGHT: 218.94 LBS | DIASTOLIC BLOOD PRESSURE: 78 MMHG | HEIGHT: 66 IN | HEART RATE: 102 BPM

## 2023-06-27 DIAGNOSIS — R10.11 CHRONIC RUQ PAIN: ICD-10-CM

## 2023-06-27 DIAGNOSIS — E66.01 CLASS 2 SEVERE OBESITY DUE TO EXCESS CALORIES WITH SERIOUS COMORBIDITY AND BODY MASS INDEX (BMI) OF 36.0 TO 36.9 IN ADULT: ICD-10-CM

## 2023-06-27 DIAGNOSIS — G89.29 CHRONIC RUQ PAIN: ICD-10-CM

## 2023-06-27 DIAGNOSIS — K76.0 FATTY LIVER: Primary | ICD-10-CM

## 2023-06-27 DIAGNOSIS — R74.8 ELEVATED LIVER ENZYMES: ICD-10-CM

## 2023-06-27 DIAGNOSIS — K76.0 FATTY LIVER: ICD-10-CM

## 2023-06-27 LAB
ALBUMIN SERPL BCP-MCNC: 4 G/DL (ref 3.5–5.2)
ALP SERPL-CCNC: 131 U/L (ref 55–135)
ALT SERPL W/O P-5'-P-CCNC: 214 U/L (ref 10–44)
ANION GAP SERPL CALC-SCNC: 12 MMOL/L (ref 8–16)
AST SERPL-CCNC: 126 U/L (ref 10–40)
BILIRUB DIRECT SERPL-MCNC: 0.2 MG/DL (ref 0.1–0.3)
BILIRUB SERPL-MCNC: 0.4 MG/DL (ref 0.1–1)
BUN SERPL-MCNC: 10 MG/DL (ref 6–20)
CALCIUM SERPL-MCNC: 9.5 MG/DL (ref 8.7–10.5)
CHLORIDE SERPL-SCNC: 105 MMOL/L (ref 95–110)
CO2 SERPL-SCNC: 22 MMOL/L (ref 23–29)
CREAT SERPL-MCNC: 0.8 MG/DL (ref 0.5–1.4)
EST. GFR  (NO RACE VARIABLE): >60 ML/MIN/1.73 M^2
GLUCOSE SERPL-MCNC: 80 MG/DL (ref 70–110)
INR PPP: 0.9 (ref 0.8–1.2)
IRON SERPL-MCNC: 90 UG/DL (ref 30–160)
POTASSIUM SERPL-SCNC: 4.1 MMOL/L (ref 3.5–5.1)
PROT SERPL-MCNC: 8.4 G/DL (ref 6–8.4)
PROTHROMBIN TIME: 10.1 SEC (ref 9–12.5)
SATURATED IRON: 21 % (ref 20–50)
SODIUM SERPL-SCNC: 139 MMOL/L (ref 136–145)
TOTAL IRON BINDING CAPACITY: 429 UG/DL (ref 250–450)
TRANSFERRIN SERPL-MCNC: 290 MG/DL (ref 200–375)

## 2023-06-27 PROCEDURE — 99999 PR PBB SHADOW E&M-EST. PATIENT-LVL III: CPT | Mod: PBBFAC,,, | Performed by: NURSE PRACTITIONER

## 2023-06-27 PROCEDURE — 99204 OFFICE O/P NEW MOD 45 MIN: CPT | Mod: S$GLB,,, | Performed by: NURSE PRACTITIONER

## 2023-06-27 PROCEDURE — 1159F MED LIST DOCD IN RCRD: CPT | Mod: CPTII,S$GLB,, | Performed by: NURSE PRACTITIONER

## 2023-06-27 PROCEDURE — 36415 COLL VENOUS BLD VENIPUNCTURE: CPT | Performed by: NURSE PRACTITIONER

## 2023-06-27 PROCEDURE — 84466 ASSAY OF TRANSFERRIN: CPT | Performed by: NURSE PRACTITIONER

## 2023-06-27 PROCEDURE — 80076 HEPATIC FUNCTION PANEL: CPT | Performed by: NURSE PRACTITIONER

## 2023-06-27 PROCEDURE — 87340 HEPATITIS B SURFACE AG IA: CPT | Performed by: NURSE PRACTITIONER

## 2023-06-27 PROCEDURE — 99204 PR OFFICE/OUTPT VISIT, NEW, LEVL IV, 45-59 MIN: ICD-10-PCS | Mod: S$GLB,,, | Performed by: NURSE PRACTITIONER

## 2023-06-27 PROCEDURE — 86015 ACTIN ANTIBODY EACH: CPT | Performed by: NURSE PRACTITIONER

## 2023-06-27 PROCEDURE — 3008F BODY MASS INDEX DOCD: CPT | Mod: CPTII,S$GLB,, | Performed by: NURSE PRACTITIONER

## 2023-06-27 PROCEDURE — 82390 ASSAY OF CERULOPLASMIN: CPT | Performed by: NURSE PRACTITIONER

## 2023-06-27 PROCEDURE — 85610 PROTHROMBIN TIME: CPT | Performed by: NURSE PRACTITIONER

## 2023-06-27 PROCEDURE — 3044F HG A1C LEVEL LT 7.0%: CPT | Mod: CPTII,S$GLB,, | Performed by: NURSE PRACTITIONER

## 2023-06-27 PROCEDURE — 3074F PR MOST RECENT SYSTOLIC BLOOD PRESSURE < 130 MM HG: ICD-10-PCS | Mod: CPTII,S$GLB,, | Performed by: NURSE PRACTITIONER

## 2023-06-27 PROCEDURE — 82103 ALPHA-1-ANTITRYPSIN TOTAL: CPT | Performed by: NURSE PRACTITIONER

## 2023-06-27 PROCEDURE — 82728 ASSAY OF FERRITIN: CPT | Performed by: NURSE PRACTITIONER

## 2023-06-27 PROCEDURE — 86038 ANTINUCLEAR ANTIBODIES: CPT | Performed by: NURSE PRACTITIONER

## 2023-06-27 PROCEDURE — 86235 NUCLEAR ANTIGEN ANTIBODY: CPT | Mod: 59 | Performed by: NURSE PRACTITIONER

## 2023-06-27 PROCEDURE — 3078F DIAST BP <80 MM HG: CPT | Mod: CPTII,S$GLB,, | Performed by: NURSE PRACTITIONER

## 2023-06-27 PROCEDURE — 86803 HEPATITIS C AB TEST: CPT | Performed by: NURSE PRACTITIONER

## 2023-06-27 PROCEDURE — 86704 HEP B CORE ANTIBODY TOTAL: CPT | Performed by: NURSE PRACTITIONER

## 2023-06-27 PROCEDURE — 3044F PR MOST RECENT HEMOGLOBIN A1C LEVEL <7.0%: ICD-10-PCS | Mod: CPTII,S$GLB,, | Performed by: NURSE PRACTITIONER

## 2023-06-27 PROCEDURE — 99999 PR PBB SHADOW E&M-EST. PATIENT-LVL III: ICD-10-PCS | Mod: PBBFAC,,, | Performed by: NURSE PRACTITIONER

## 2023-06-27 PROCEDURE — 86381 MITOCHONDRIAL ANTIBODY EACH: CPT | Performed by: NURSE PRACTITIONER

## 2023-06-27 PROCEDURE — 3008F PR BODY MASS INDEX (BMI) DOCUMENTED: ICD-10-PCS | Mod: CPTII,S$GLB,, | Performed by: NURSE PRACTITIONER

## 2023-06-27 PROCEDURE — 3078F PR MOST RECENT DIASTOLIC BLOOD PRESSURE < 80 MM HG: ICD-10-PCS | Mod: CPTII,S$GLB,, | Performed by: NURSE PRACTITIONER

## 2023-06-27 PROCEDURE — 3074F SYST BP LT 130 MM HG: CPT | Mod: CPTII,S$GLB,, | Performed by: NURSE PRACTITIONER

## 2023-06-27 PROCEDURE — 1159F PR MEDICATION LIST DOCUMENTED IN MEDICAL RECORD: ICD-10-PCS | Mod: CPTII,S$GLB,, | Performed by: NURSE PRACTITIONER

## 2023-06-27 PROCEDURE — 86706 HEP B SURFACE ANTIBODY: CPT | Performed by: NURSE PRACTITIONER

## 2023-06-27 PROCEDURE — 86790 VIRUS ANTIBODY NOS: CPT | Performed by: NURSE PRACTITIONER

## 2023-06-27 PROCEDURE — 86039 ANTINUCLEAR ANTIBODIES (ANA): CPT | Performed by: NURSE PRACTITIONER

## 2023-06-27 PROCEDURE — 86225 DNA ANTIBODY NATIVE: CPT | Mod: 59 | Performed by: NURSE PRACTITIONER

## 2023-06-27 PROCEDURE — 80048 BASIC METABOLIC PNL TOTAL CA: CPT | Performed by: NURSE PRACTITIONER

## 2023-06-27 NOTE — PROGRESS NOTES
Clinic Consult:  [facility] Consultation Note    Reason for Consult:  The primary encounter diagnosis was Fatty liver. Diagnoses of Elevated liver enzymes, Class 2 severe obesity due to excess calories with serious comorbidity and body mass index (BMI) of 36.0 to 36.9 in adult, and Chronic RUQ pain were also pertinent to this visit.    PCP: Love Reynolds   [id number] [facility] / [place] [id number]    HPI:  This is a 28 y.o. female here for evaluation of the above  Pt referred by PCP for further evaluation and management of elevated LFTs  Pt reports that she was diagnosed with NAFLD a few years ago while undergoing evaluation of GBD.   She was seen recently for wellness visit and labs noted continued elevation of LFTs.   Pt denies any new or change in medication.   No significant ETOH intake  PMH negative for HTN, HLD or DM.   +obesity.   No upper GI bleeding.  No ascites or BLE.  No overt confusion.  Has had some chronic RUQ abdominal pain that appear to be somewhat better with OCP.        Review of Systems   Constitutional:  Negative for chills, fever, malaise/fatigue and weight loss.   Respiratory:  Negative for cough.    Cardiovascular:  Negative for chest pain.   Gastrointestinal:         Per HPI   Musculoskeletal:  Negative for myalgias.   Skin:  Negative for itching and rash.   Neurological:  Negative for headaches.   Psychiatric/Behavioral:  The patient is not nervous/anxious.      Medical History:   Past Medical History:   Diagnosis Date    Acne        Surgical History:  Past Surgical History:   Procedure Laterality Date    NOSE SURGERY      ROBOT-ASSISTED CHOLECYSTECTOMY USING DA NICK XI N/A 1/7/2022    Procedure: XI ROBOTIC CHOLECYSTECTOMY;  Surgeon: Ernesto Plaza MD;  Location: [facility];  Service: General;  Laterality: N/A;       Family History:   Family History   Problem Relation Age of Onset    Hyperlipidemia Mother     Hypertension Father     Heart disease Maternal Aunt     Heart  "disease Maternal Uncle     Cancer Paternal Uncle         bone    Heart disease Maternal Grandmother     Hypertension Maternal Grandfather     Cancer Maternal Grandfather     Colon cancer Paternal Grandfather        Social History:   Social History     Tobacco Use    Smoking status: Never    Smokeless tobacco: Never   Substance Use Topics    Alcohol use: Yes     Alcohol/week: 2.0 standard drinks     Types: 1 Glasses of wine, 1 Shots of liquor per week     Comment: occas    Drug use: No       Allergies: Reviewed    Home Medications:   Current Outpatient Medications on File Prior to Visit   Medication Sig Dispense Refill    amoxicillin (AMOXIL) 500 MG capsule Take 1 capsule (500 mg total) by mouth every 12 (twelve) hours. for 10 days 20 capsule 0    VIENVA 0.1-20 mg-mcg per tablet Take 1 tablet by mouth once daily. Patient to take continously. Ok to refill sooner than every three months. 90 tablet 3    semaglutide, weight loss, (WEGOVY) 0.25 mg/0.5 mL PnIj Inject 0.25 mg into the skin every 7 days. (Patient not taking: Reported on 6/27/2023) 2 mL 1     Current Facility-Administered Medications on File Prior to Visit   Medication Dose Route Frequency Provider Last Rate Last Admin    lactated ringers infusion   Intravenous Continuous Marj Dejesus MD   New Bag at 02/02/22 0700       Physical Exam:  Vital Signs:  /78 (BP Location: Left arm, Patient Position: Sitting, BP Method: Medium (Manual))   Pulse 102   Ht 5' 6" (1.676 m)   Wt 99.3 kg (218 lb 14.7 oz)   LMP  (LMP Unknown)   BMI 35.33 kg/m²   Body mass index is 35.33 kg/m².  Physical Exam  Vitals reviewed.   Constitutional:       Appearance: She is well-developed. She is obese.   HENT:      Head: Normocephalic and atraumatic.   Eyes:      General: No scleral icterus.  Cardiovascular:      Rate and Rhythm: Normal rate.   Pulmonary:      Effort: Pulmonary effort is normal.   Abdominal:      General: There is no distension.      Palpations: Abdomen is soft. "   Musculoskeletal:         General: Normal range of motion.      Cervical back: Normal range of motion.   Skin:     General: Skin is dry.   Neurological:      Mental Status: She is alert and oriented to person, place, and time.       Labs: Pertinent labs reviewed.      Assessment:  1. Fatty liver    2. Elevated liver enzymes    3. Class 2 severe obesity due to excess calories with serious comorbidity and body mass index (BMI) of 36.0 to 36.9 in adult    4. Chronic RUQ pain         Recommendations:  - Educated patient on spectrum of fatty liver disease and potential for cirrhosis if PATINO present  - Advised weight loss (10%), strict glycemic control and lipid control (statins are ok if needed, prescribing doctor will need to monitor LFTs per routine)  - Mediterranean diet discussed  - will obtain additional labs to R/O other etiologies of liver disease  - if there is no concern for autoimmune or cholestatic liver disease, will plan for Fibroscan .    Follow up to be determined by results of above.          Thank you so much for allowing me to participate in the care of MAIKOL Alfonso

## 2023-06-28 LAB
A1AT SERPL-MCNC: 258 MG/DL (ref 100–190)
ANA PATTERN 1: NORMAL
ANA PATTERN 2: NORMAL
ANA SER QL IF: POSITIVE
ANA TITER 2: NORMAL
ANA TITR SER IF: NORMAL {TITER}
CERULOPLASMIN SERPL-MCNC: 51 MG/DL (ref 15–45)
FERRITIN SERPL-MCNC: 501 NG/ML (ref 20–300)
HAV IGG SER QL IA: REACTIVE
HBV CORE AB SERPL QL IA: NORMAL
HBV SURFACE AB SER-ACNC: <3 MIU/ML
HBV SURFACE AB SER-ACNC: NORMAL M[IU]/ML
HBV SURFACE AG SERPL QL IA: NORMAL
HCV AB SERPL QL IA: NORMAL

## 2023-06-30 LAB
ANTI SM ANTIBODY: 0.17 RATIO (ref 0–0.99)
ANTI SM/RNP ANTIBODY: 0.16 RATIO (ref 0–0.99)
ANTI-SM INTERPRETATION: NEGATIVE
ANTI-SM/RNP INTERPRETATION: NEGATIVE
ANTI-SSA ANTIBODY: 0.14 RATIO (ref 0–0.99)
ANTI-SSA INTERPRETATION: NEGATIVE
ANTI-SSB ANTIBODY: 1.31 RATIO (ref 0–0.99)
ANTI-SSB INTERPRETATION: POSITIVE
DNA TITER: NORMAL
DSDNA AB SER-ACNC: POSITIVE [IU]/ML
MITOCHONDRIA AB TITR SER IF: NORMAL {TITER}
SMOOTH MUSCLE AB TITR SER IF: NORMAL {TITER}

## 2023-07-03 DIAGNOSIS — R76.8 POSITIVE ANA (ANTINUCLEAR ANTIBODY): ICD-10-CM

## 2023-07-03 DIAGNOSIS — R74.8 ELEVATED LIVER ENZYMES: Primary | ICD-10-CM

## 2023-07-10 ENCOUNTER — OFFICE VISIT (OUTPATIENT)
Dept: RHEUMATOLOGY | Facility: CLINIC | Age: 28
End: 2023-07-10
Payer: COMMERCIAL

## 2023-07-10 ENCOUNTER — HOSPITAL ENCOUNTER (OUTPATIENT)
Dept: RADIOLOGY | Facility: HOSPITAL | Age: 28
Discharge: HOME | End: 2023-07-10
Attending: PHYSICIAN ASSISTANT
Payer: COMMERCIAL

## 2023-07-10 VITALS
BODY MASS INDEX: 35.04 KG/M2 | HEART RATE: 91 BPM | DIASTOLIC BLOOD PRESSURE: 82 MMHG | SYSTOLIC BLOOD PRESSURE: 115 MMHG | WEIGHT: 218.06 LBS | HEIGHT: 66 IN

## 2023-07-10 DIAGNOSIS — R06.02 SOB (SHORTNESS OF BREATH): ICD-10-CM

## 2023-07-10 DIAGNOSIS — R76.8 DS DNA ANTIBODY POSITIVE: Primary | ICD-10-CM

## 2023-07-10 DIAGNOSIS — R76.8 POSITIVE ANA (ANTINUCLEAR ANTIBODY): ICD-10-CM

## 2023-07-10 DIAGNOSIS — R74.8 ELEVATED LIVER ENZYMES: ICD-10-CM

## 2023-07-10 PROCEDURE — 99215 PR OFFICE/OUTPT VISIT, EST, LEVL V, 40-54 MIN: ICD-10-PCS | Mod: S$GLB,,, | Performed by: PHYSICIAN ASSISTANT

## 2023-07-10 PROCEDURE — 3044F PR MOST RECENT HEMOGLOBIN A1C LEVEL <7.0%: ICD-10-PCS | Mod: CPTII,S$GLB,, | Performed by: PHYSICIAN ASSISTANT

## 2023-07-10 PROCEDURE — 3008F BODY MASS INDEX DOCD: CPT | Mod: CPTII,S$GLB,, | Performed by: PHYSICIAN ASSISTANT

## 2023-07-10 PROCEDURE — 3079F PR MOST RECENT DIASTOLIC BLOOD PRESSURE 80-89 MM HG: ICD-10-PCS | Mod: CPTII,S$GLB,, | Performed by: PHYSICIAN ASSISTANT

## 2023-07-10 PROCEDURE — 99999 PR PBB SHADOW E&M-EST. PATIENT-LVL IV: ICD-10-PCS | Mod: PBBFAC,,, | Performed by: PHYSICIAN ASSISTANT

## 2023-07-10 PROCEDURE — 1160F RVW MEDS BY RX/DR IN RCRD: CPT | Mod: CPTII,S$GLB,, | Performed by: PHYSICIAN ASSISTANT

## 2023-07-10 PROCEDURE — 3008F PR BODY MASS INDEX (BMI) DOCUMENTED: ICD-10-PCS | Mod: CPTII,S$GLB,, | Performed by: PHYSICIAN ASSISTANT

## 2023-07-10 PROCEDURE — 71046 X-RAY EXAM CHEST 2 VIEWS: CPT | Mod: 26,,, | Performed by: RADIOLOGY

## 2023-07-10 PROCEDURE — 3044F HG A1C LEVEL LT 7.0%: CPT | Mod: CPTII,S$GLB,, | Performed by: PHYSICIAN ASSISTANT

## 2023-07-10 PROCEDURE — 71046 XR CHEST PA AND LATERAL: ICD-10-PCS | Mod: 26,,, | Performed by: RADIOLOGY

## 2023-07-10 PROCEDURE — 99999 PR PBB SHADOW E&M-EST. PATIENT-LVL IV: CPT | Mod: PBBFAC,,, | Performed by: PHYSICIAN ASSISTANT

## 2023-07-10 PROCEDURE — 99215 OFFICE O/P EST HI 40 MIN: CPT | Mod: S$GLB,,, | Performed by: PHYSICIAN ASSISTANT

## 2023-07-10 PROCEDURE — 3074F PR MOST RECENT SYSTOLIC BLOOD PRESSURE < 130 MM HG: ICD-10-PCS | Mod: CPTII,S$GLB,, | Performed by: PHYSICIAN ASSISTANT

## 2023-07-10 PROCEDURE — 1159F PR MEDICATION LIST DOCUMENTED IN MEDICAL RECORD: ICD-10-PCS | Mod: CPTII,S$GLB,, | Performed by: PHYSICIAN ASSISTANT

## 2023-07-10 PROCEDURE — 3079F DIAST BP 80-89 MM HG: CPT | Mod: CPTII,S$GLB,, | Performed by: PHYSICIAN ASSISTANT

## 2023-07-10 PROCEDURE — 1160F PR REVIEW ALL MEDS BY PRESCRIBER/CLIN PHARMACIST DOCUMENTED: ICD-10-PCS | Mod: CPTII,S$GLB,, | Performed by: PHYSICIAN ASSISTANT

## 2023-07-10 PROCEDURE — 71046 X-RAY EXAM CHEST 2 VIEWS: CPT | Mod: TC

## 2023-07-10 PROCEDURE — 1159F MED LIST DOCD IN RCRD: CPT | Mod: CPTII,S$GLB,, | Performed by: PHYSICIAN ASSISTANT

## 2023-07-10 PROCEDURE — 3074F SYST BP LT 130 MM HG: CPT | Mod: CPTII,S$GLB,, | Performed by: PHYSICIAN ASSISTANT

## 2023-07-10 NOTE — PROGRESS NOTES
Subjective:      Patient ID: Charlotte Patricia is a 28 y.o. female.    Chief Complaint: Positive BUSTER, Disease Management, and Elevated Hepatic Enzymes      HPI   Charlotte Patricia  is a 28 y.o. female seen today for rheumatologic evaluation.  She was seen in the hepatology Department for elevated liver enzymes.  They were able to rule out autoimmune hepatitis.  She has history of elevated liver enzymes.  Hepatology ran an BUSTER screen on her.  It was positive so she is been referred here for further evaluation.    Patient has history of some right upper quadrant abdominal pain.  Underwent cholecystectomy 2022.  That has helped.  She does still have some discomfort.     Patient endorses mild SOB which she attributes to weight gain.  Occasional chest pain (like heart burn but not associated w eating).  She denies fevers, chills, photosensitivity, eye pain, hematuria, blood in the stool, rash, sicca symptoms, raynauds, finger ulcerations, LAD, weakness, tender swollen joints.  Rheumatologic systems otherwise negative.    Serologies/Labs:  +BUSTER >=1:2560 (homogenous) and 1:640 speckled  +SSB, +dsDNA (>=1:5120)  Neg anti mitochondrial, anti smooth muscle  Current Treatment:  OTC NSAIDs prn  Previous Treatment:   na      Current Outpatient Medications:     VIENVA 0.1-20 mg-mcg per tablet, Take 1 tablet by mouth once daily. Patient to take continously. Ok to refill sooner than every three months., Disp: 90 tablet, Rfl: 3    semaglutide, weight loss, (WEGOVY) 0.25 mg/0.5 mL PnIj, Inject 0.25 mg into the skin every 7 days., Disp: 2 mL, Rfl: 1  No current facility-administered medications for this visit.    Facility-Administered Medications Ordered in Other Visits:     lactated ringers infusion, , Intravenous, Continuous, Marj Dejesus MD, New Bag at 02/02/22 0700    Past Medical History:   Diagnosis Date    Acne      Family History   Problem Relation Age of Onset    Hyperlipidemia Mother     Hypertension Father      "Heart disease Maternal Aunt     Heart disease Maternal Uncle     Cancer Paternal Uncle         bone    Heart disease Maternal Grandmother     Hypertension Maternal Grandfather     Cancer Maternal Grandfather     Colon cancer Paternal Grandfather      Social History     Socioeconomic History    Marital status:     Number of children: 0   Occupational History    Occupation: Student- [employer]   Tobacco Use    Smoking status: Never    Smokeless tobacco: Never   Substance and Sexual Activity    Alcohol use: Yes     Alcohol/week: 2.0 standard drinks     Types: 1 Glasses of wine, 1 Shots of liquor per week     Comment: occas    Drug use: No    Sexual activity: Yes     Partners: Male     Birth control/protection: Pill, OCP   Social History Narrative    ** Merged History Encounter **          Review of patient's allergies indicates:   Allergen Reactions    Sulfa (sulfonamide antibiotics) Rash       Objective:   /82   Pulse 91   Ht 5' 6" (1.676 m)   Wt 98.9 kg (218 lb 0.6 oz)   LMP 06/10/2023   BMI 35.19 kg/m²   Immunization History   Administered Date(s) Administered    COVID-19, MRNA, LN-S, PF (Pfizer) (Purple Cap) 04/01/2021, 04/22/2021, 12/30/2021    DTP 1995, 1995, 1995, 10/10/1997    DTP / HiB 1995    DTaP 09/28/1999    HIB 1995, 1995, 1995, 10/10/1997    Hepatitis A, Pediatric/Adolescent, 2 Dose 11/21/2006, 05/31/2007    Hepatitis B, Pediatric/Adolescent 1995, 1995, 1995    Influenza (Flumist) - Quadrivalent - Intranasal *Preferred* (2-49 years old) 11/06/2012    Influenza - Intranasal 11/06/2012    Influenza - Intranasal - Trivalent 11/02/2009, 11/17/2010, 10/19/2011    Influenza - Quadrivalent - MDCK - PF 12/30/2021    Influenza - Quadrivalent - PF *Preferred* (6 months and older) 11/18/2019, 11/14/2020    Influenza Split 12/08/2003, 01/12/2004, 11/09/2005, 11/21/2006, 10/30/2007, 10/30/2008    MMR 04/12/1996, 09/28/1999    " Meningococcal Conjugate (MCV4P) 11/21/2006    OPV 1995, 1995, 1995, 09/28/1999    Tdap 11/21/2006    Varicella 04/12/1996, 02/04/2009       Physical Exam   Constitutional: She is oriented to person, place, and time. No distress.   HENT:   Head: Normocephalic and atraumatic.   Pulmonary/Chest: Effort normal.   Abdominal: She exhibits no distension.   Musculoskeletal:         General: No swelling or tenderness. Normal range of motion.      Cervical back: Normal range of motion.   Lymphadenopathy:     She has no cervical adenopathy.   Neurological: She is alert and oriented to person, place, and time.   Skin: Skin is warm and dry. No rash noted.   Psychiatric: Mood normal.   Nursing note and vitals reviewed.  No synovitis, no dactylitis, no enthesitis  No effusions of large or small joints  100% fist formation  Well preserved ROM        Recent Results (from the past 672 hour(s))   TSH    Collection Time: 06/13/23  9:33 AM   Result Value Ref Range    TSH 2.244 0.400 - 4.000 uIU/mL   T4, Free    Collection Time: 06/13/23  9:33 AM   Result Value Ref Range    Free T4 0.95 0.71 - 1.51 ng/dL   Lipid Panel    Collection Time: 06/13/23  9:33 AM   Result Value Ref Range    Cholesterol 230 (H) 120 - 199 mg/dL    Triglycerides 207 (H) 30 - 150 mg/dL    HDL 41 40 - 75 mg/dL    LDL Cholesterol 147.6 63.0 - 159.0 mg/dL    HDL/Cholesterol Ratio 17.8 (L) 20.0 - 50.0 %    Total Cholesterol/HDL Ratio 5.6 (H) 2.0 - 5.0    Non-HDL Cholesterol 189 mg/dL   Hemoglobin A1C    Collection Time: 06/13/23  9:33 AM   Result Value Ref Range    Hemoglobin A1C 5.6 4.0 - 5.6 %    Estimated Avg Glucose 114 68 - 131 mg/dL   Comprehensive Metabolic Panel    Collection Time: 06/13/23  9:33 AM   Result Value Ref Range    Sodium 139 136 - 145 mmol/L    Potassium 4.1 3.5 - 5.1 mmol/L    Chloride 106 95 - 110 mmol/L    CO2 21 (L) 23 - 29 mmol/L    Glucose 95 70 - 110 mg/dL    BUN 9 6 - 20 mg/dL    Creatinine 0.8 0.5 - 1.4 mg/dL    Calcium  9.2 8.7 - 10.5 mg/dL    Total Protein 8.2 6.0 - 8.4 g/dL    Albumin 3.7 3.5 - 5.2 g/dL    Total Bilirubin 0.7 0.1 - 1.0 mg/dL    Alkaline Phosphatase 140 (H) 55 - 135 U/L    AST 70 (H) 10 - 40 U/L     (H) 10 - 44 U/L    Anion Gap 12 8 - 16 mmol/L    eGFR >60.0 >60 mL/min/1.73 m^2   CBC Auto Differential    Collection Time: 06/13/23  9:33 AM   Result Value Ref Range    WBC 11.82 3.90 - 12.70 K/uL    RBC 4.66 4.00 - 5.40 M/uL    Hemoglobin 13.8 12.0 - 16.0 g/dL    Hematocrit 42.1 37.0 - 48.5 %    MCV 90 82 - 98 fL    MCH 29.6 27.0 - 31.0 pg    MCHC 32.8 32.0 - 36.0 g/dL    RDW 12.4 11.5 - 14.5 %    Platelets 285 150 - 450 K/uL    MPV 12.3 9.2 - 12.9 fL    Immature Granulocytes 0.3 0.0 - 0.5 %    Gran # (ANC) 8.9 (H) 1.8 - 7.7 K/uL    Immature Grans (Abs) 0.04 0.00 - 0.04 K/uL    Lymph # 2.3 1.0 - 4.8 K/uL    Mono # 0.5 0.3 - 1.0 K/uL    Eos # 0.1 0.0 - 0.5 K/uL    Baso # 0.07 0.00 - 0.20 K/uL    nRBC 0 0 /100 WBC    Gran % 74.9 (H) 38.0 - 73.0 %    Lymph % 19.0 18.0 - 48.0 %    Mono % 4.4 4.0 - 15.0 %    Eosinophil % 0.8 0.0 - 8.0 %    Basophil % 0.6 0.0 - 1.9 %    Differential Method Automated    Insulin, Random    Collection Time: 06/13/23  9:33 AM   Result Value Ref Range    Insulin 15.9 <25.0 uU/mL    Insulin Collection Interval unk    Vitamin D    Collection Time: 06/13/23  9:33 AM   Result Value Ref Range    Vit D, 25-Hydroxy 36 30 - 96 ng/mL   Protime-INR    Collection Time: 06/13/23  9:33 AM   Result Value Ref Range    Prothrombin Time 11.1 9.0 - 12.5 sec    INR 1.0 0.8 - 1.2   Gamma GT    Collection Time: 06/13/23  9:33 AM   Result Value Ref Range     (H) 8 - 55 U/L   POCT Strep A, Molecular    Collection Time: 06/18/23  9:41 AM   Result Value Ref Range    Molecular Strep A, POC Positive (A) Negative     Acceptable Yes    Alpha-1-Antitrypsin    Collection Time: 06/27/23  1:36 PM   Result Value Ref Range    A-1 Antitrypsin 258 (H) 100 - 190 mg/dL   BUSTER Screen w/Reflex    Collection  Time: 06/27/23  1:36 PM   Result Value Ref Range    BUSTER Screen Positive (A) Negative <1:80   Antimitochondrial Antibody    Collection Time: 06/27/23  1:36 PM   Result Value Ref Range    Anti-Mitochon Ab IFA Negative 1:40 Negative   Anti-Smooth Muscle Antibody    Collection Time: 06/27/23  1:36 PM   Result Value Ref Range    Smooth Muscle Ab Negative 1:40 Negative   Ceruloplasmin    Collection Time: 06/27/23  1:36 PM   Result Value Ref Range    Ceruloplasmin 51.0 (H) 15.0 - 45.0 mg/dL   Hepatitis A antibody, IgG    Collection Time: 06/27/23  1:36 PM   Result Value Ref Range    Hepatitis A Antibody IgG Reactive    Basic Metabolic Panel    Collection Time: 06/27/23  1:36 PM   Result Value Ref Range    Sodium 139 136 - 145 mmol/L    Potassium 4.1 3.5 - 5.1 mmol/L    Chloride 105 95 - 110 mmol/L    CO2 22 (L) 23 - 29 mmol/L    Glucose 80 70 - 110 mg/dL    BUN 10 6 - 20 mg/dL    Creatinine 0.8 0.5 - 1.4 mg/dL    Calcium 9.5 8.7 - 10.5 mg/dL    Anion Gap 12 8 - 16 mmol/L    eGFR >60.0 >60 mL/min/1.73 m^2   Hepatic Function Panel    Collection Time: 06/27/23  1:36 PM   Result Value Ref Range    Total Protein 8.4 6.0 - 8.4 g/dL    Albumin 4.0 3.5 - 5.2 g/dL    Total Bilirubin 0.4 0.1 - 1.0 mg/dL    Bilirubin, Direct 0.2 0.1 - 0.3 mg/dL     (H) 10 - 40 U/L     (H) 10 - 44 U/L    Alkaline Phosphatase 131 55 - 135 U/L   Protime-INR    Collection Time: 06/27/23  1:36 PM   Result Value Ref Range    Prothrombin Time 10.1 9.0 - 12.5 sec    INR 0.9 0.8 - 1.2   Ferritin    Collection Time: 06/27/23  1:36 PM   Result Value Ref Range    Ferritin 501 (H) 20.0 - 300.0 ng/mL   Iron and TIBC    Collection Time: 06/27/23  1:36 PM   Result Value Ref Range    Iron 90 30 - 160 ug/dL    Transferrin 290 200 - 375 mg/dL    TIBC 429 250 - 450 ug/dL    Saturated Iron 21 20 - 50 %   Hepatitis B Surface Ab, Qualitative    Collection Time: 06/27/23  1:36 PM   Result Value Ref Range    Hep B S Ab <3.00 mIU/mL    Hep B S Ab Non-reactive     Hepatitis B Surface Antigen    Collection Time: 06/27/23  1:36 PM   Result Value Ref Range    Hepatitis B Surface Ag Non-reactive Non-reactive   Hepatitis B Core Antibody, Total    Collection Time: 06/27/23  1:36 PM   Result Value Ref Range    Hep B Core Total Ab Non-reactive Non-reactive   Hepatitis C Antibody    Collection Time: 06/27/23  1:36 PM   Result Value Ref Range    Hepatitis C Ab Non-reactive Non-reactive   LEYDI Pattern 1    Collection Time: 06/27/23  1:36 PM   Result Value Ref Range    LEYDI PATTERN 1 Homogeneous    LEYDI Profile    Collection Time: 06/27/23  1:36 PM   Result Value Ref Range    Anti Sm Antibody 0.17 0.00 - 0.99 Ratio    Anti-Sm Interpretation Negative Negative    Anti-SSA Antibody 0.14 0.00 - 0.99 Ratio    Anti-SSA Interpretation Negative Negative    Anti-SSB Antibody 1.31 (H) 0.00 - 0.99 Ratio    Anti-SSB Interpretation Positive (A) Negative    ds DNA Ab Positive (A) Negative 1:10    Anti Sm/RNP Antibody 0.16 0.00 - 0.99 Ratio    Anti-Sm/RNP Interpretation Negative Negative   LEYDI Titer 1    Collection Time: 06/27/23  1:36 PM   Result Value Ref Range    LEYDI Titer 1 >=1:2560    LEYDI Pattern 2    Collection Time: 06/27/23  1:36 PM   Result Value Ref Range    LEYDI Pattern 2 Speckled    LEYDI Titer 2    Collection Time: 06/27/23  1:36 PM   Result Value Ref Range    LEYDI Titer 2 1:640    DNA Titer    Collection Time: 06/27/23  1:36 PM   Result Value Ref Range    DNA Titer >=1:5120        No results found for: TBGOLDPLUS   Lab Results   Component Value Date    HEPBCAB Non-reactive 06/27/2023    HEPCAB Non-reactive 06/27/2023          Assessment:     1. Ds DNA antibody positive    2. Elevated liver enzymes    3. Positive LEYDI (antinuclear antibody)    4. SOB (shortness of breath)            Plan:     Charlotte was seen today for positive leydi, disease management and elevated hepatic enzymes.    Diagnoses and all orders for this visit:    Ds DNA antibody positive  -     Comprehensive Metabolic Panel;  Standing  -     CBC Auto Differential; Standing  -     Sedimentation rate; Standing  -     C-Reactive Protein; Standing  -     Anti-DNA Ab, Double-Stranded; Standing  -     Protein/Creatinine Ratio, Urine; Standing  -     C4 Complement; Standing  -     C3 Complement; Standing  -     Urinalysis Microscopic; Standing  -     Protein Electrophoresis, Serum; Standing  -     Immunofixation Electrophoresis; Standing  -     Cryoglobulin; Standing    Elevated liver enzymes  -     Ambulatory referral/consult to Rheumatology    Positive BUSTER (antinuclear antibody)  -     Ambulatory referral/consult to Rheumatology  -     Comprehensive Metabolic Panel; Standing  -     CBC Auto Differential; Standing  -     Sedimentation rate; Standing  -     C-Reactive Protein; Standing  -     Anti-DNA Ab, Double-Stranded; Standing  -     Protein/Creatinine Ratio, Urine; Standing  -     C4 Complement; Standing  -     C3 Complement; Standing  -     Urinalysis Microscopic; Standing  -     Protein Electrophoresis, Serum; Standing  -     Immunofixation Electrophoresis; Standing  -     Cryoglobulin; Standing  -     BUSTER Screen w/Reflex; Future  -     X-Ray Chest PA And Lateral; Future    SOB (shortness of breath)  -     X-Ray Chest PA And Lateral; Future        +BUSTER w +dsDNA and +SSB  Denies sicca sxs, mucosal ulcerations, raynauds, dysphagia, photosensitivity, rash, joint pain  Mild SOB - CXR today  Consider Chest CT  Check SLE/SS labs today  No need to add DMARD at present  Pt to notify office of changes in sxs  Return to clinic: 4 mos w SLE labs and BUSTER    45 minutes of total time spent on the encounter, which includes face to face time and non-face to face time preparing to see the patient (eg, review of tests), Obtaining and/or reviewing separately obtained history, Documenting clinical information in the electronic or other health record, Independently interpreting results (not separately reported) and communicating results to the  patient/family/caregiver, or Care coordination (not separately reported).     Follow up in about 4 months (around 11/10/2023).    The patient understands, chooses and consents to this plan and accepts all the risks which include but are not limited to the risks mentioned above.     Disclaimer: This note was prepared using a voice recognition system and is likely to have sound alike errors within the text.

## 2023-07-20 ENCOUNTER — TELEPHONE (OUTPATIENT)
Dept: HEPATOLOGY | Facility: CLINIC | Age: 28
End: 2023-07-20
Payer: COMMERCIAL

## 2023-07-20 ENCOUNTER — PATIENT MESSAGE (OUTPATIENT)
Dept: RHEUMATOLOGY | Facility: CLINIC | Age: 28
End: 2023-07-20
Payer: COMMERCIAL

## 2023-07-20 ENCOUNTER — TELEPHONE (OUTPATIENT)
Dept: RHEUMATOLOGY | Facility: CLINIC | Age: 28
End: 2023-07-20
Payer: COMMERCIAL

## 2023-07-20 DIAGNOSIS — R76.8 DS DNA ANTIBODY POSITIVE: ICD-10-CM

## 2023-07-20 DIAGNOSIS — R70.0 ELEVATED SED RATE: ICD-10-CM

## 2023-07-20 DIAGNOSIS — R74.8 ELEVATED LIVER ENZYMES: Primary | ICD-10-CM

## 2023-07-20 DIAGNOSIS — R76.8 POSITIVE ANA (ANTINUCLEAR ANTIBODY): ICD-10-CM

## 2023-07-20 NOTE — TELEPHONE ENCOUNTER
Spoke with patient and confirmed scheduled fibroscan appointment. Patient denies having a pacemaker, defibrillator, or any implantable metal devices. Patient was informed to be NPO (fasting) for 4 hours prior to procedure time. Patient aware this includes any medication or water. Patient verbalized understanding of instructions and has agreed to follow. Patient advised to contact office with any questions or concerns.    KONRAD LozaN, RN  Registered Nurse Navigator- Hepatology  [facility] [place]

## 2023-07-20 NOTE — TELEPHONE ENCOUNTER
"Contacted patient to discuss this with her. Patient stated that she is scheduled for her liver scan tomorrow 07/21/2023. She also stated that she saw Mrs. Pam Quirozalona Strong PA-C comment on her lab results stating that additional labs are needed tomorrow when she comes for her scan so those need to be scheduled. Advised patient that I will send a message to Mrs. Orozco"Teresa Strong PA-C to see exactly which labs she needs tomorrow and schedule those for her. Patient thanked me for the return phone call and verbalized understanding. All questions answered.     Rosalva Moss (Allye) [facility]  Rheumatology Department         Nery, which labs does she need tomorrow when she comes for her liver scan?  "

## 2023-07-20 NOTE — TELEPHONE ENCOUNTER
----- Message from Elaine Slade sent at 7/20/2023  8:41 AM CDT -----  Contact: Charlotte Sanchez is calling to speak with a nurse regarding results . Reports needing results read . Please give a call back at [phone] .

## 2023-07-20 NOTE — TELEPHONE ENCOUNTER
"Returned patients phone call. Patient stated that she was just calling regarding an update on her lab results. Advised patient that we are still waiting on one of her lab panels to result. Once Mrs. Orozco "Teresa Strong PA-C has received the results and has had a chance to review them, we will contact her to discuss. Patient thanked me for the return phone call and verbalized understanding. All questions answered.       Rosalva Moss (Allye), [facility]  Rheumatology Department            "

## 2023-07-20 NOTE — TELEPHONE ENCOUNTER
Pam Strong PA-C  You 1 hour ago (12:44 PM)       Please let her know I spoke to Dr. RICHARDSON.  She has signifcant values on multiple tests tho sxs are few.  We would like to do a steroid trial over the course of 4 weeks.  However, She is not to initiate steroids until AFTER she gets her liver scan (I believe it is tomorrow).  I have been in touch w the liver specialist and they are ok w steroids.  However, if for any reason they decide she needs a biopsy, we would hold off on steroids.

## 2023-07-21 ENCOUNTER — PROCEDURE VISIT (OUTPATIENT)
Dept: HEPATOLOGY | Facility: CLINIC | Age: 28
End: 2023-07-21
Payer: COMMERCIAL

## 2023-07-21 ENCOUNTER — PATIENT MESSAGE (OUTPATIENT)
Dept: RHEUMATOLOGY | Facility: CLINIC | Age: 28
End: 2023-07-21
Payer: COMMERCIAL

## 2023-07-21 VITALS — WEIGHT: 219.38 LBS | BODY MASS INDEX: 35.26 KG/M2 | HEIGHT: 66 IN

## 2023-07-21 DIAGNOSIS — R76.8 POSITIVE ANA (ANTINUCLEAR ANTIBODY): Primary | ICD-10-CM

## 2023-07-21 DIAGNOSIS — R76.8 DS DNA ANTIBODY POSITIVE: ICD-10-CM

## 2023-07-21 DIAGNOSIS — G89.29 CHRONIC RUQ PAIN: ICD-10-CM

## 2023-07-21 DIAGNOSIS — R10.11 CHRONIC RUQ PAIN: ICD-10-CM

## 2023-07-21 DIAGNOSIS — E66.01 CLASS 2 SEVERE OBESITY DUE TO EXCESS CALORIES WITH SERIOUS COMORBIDITY AND BODY MASS INDEX (BMI) OF 36.0 TO 36.9 IN ADULT: ICD-10-CM

## 2023-07-21 DIAGNOSIS — K76.0 FATTY LIVER: ICD-10-CM

## 2023-07-21 DIAGNOSIS — R70.0 ELEVATED SED RATE: ICD-10-CM

## 2023-07-21 PROCEDURE — 76981 USE PARENCHYMA: CPT | Mod: S$GLB,,, | Performed by: NURSE PRACTITIONER

## 2023-07-21 PROCEDURE — 91200 FIBROSCAN (VIBRATION CONTROLLED TRANSIENT ELASTOGRAPHY): ICD-10-PCS | Mod: S$GLB,,, | Performed by: FAMILY MEDICINE

## 2023-07-21 PROCEDURE — 76981 PR US, ELASTOGRAPHY, PARENCHYMA: ICD-10-PCS | Mod: S$GLB,,, | Performed by: NURSE PRACTITIONER

## 2023-07-21 PROCEDURE — 91200 LIVER ELASTOGRAPHY: CPT | Mod: S$GLB,,, | Performed by: FAMILY MEDICINE

## 2023-07-21 RX ORDER — PREDNISONE 10 MG/1
TABLET ORAL
Qty: 40 TABLET | Refills: 0 | Status: SHIPPED | OUTPATIENT
Start: 2023-07-21 | End: 2023-08-25

## 2023-07-21 NOTE — PROCEDURES
FibroScan (Vibration Controlled Transient Elastography)    Date/Time: 7/21/2023 1:00 PM  Performed by: Love Reynolds MD  Authorized by: Love Reynolds MD     Probe:     Universal Protocol: Patient's identity, procedure and site were verified, confirmatory pause was performed.  Discussed procedure including risks and potential complications.  Questions answered.  Patient verbalizes understanding and wishes to proceed with VCTE.     Procedure: After providing explanations of the procedure, patient was placed in the supine position with right arm in maximum abduction to allow optimal exposure of right lateral abdomen.  Patient was briefly assessed, Testing was performed in the mid-axillary location, 50Hz Shear Wave pulses were applied and the resulting Shear Wave and Propagation Speed detected with a 3.5 MHz ultrasonic signal, using the FibroScan probe, Skin to liver capsule distance and liver parenchyma were accessed during the entire examination with the FibroScan probe, Patient was instructed to breathe normally and to abstain from sudden movements during the procedure, allowing for random measurements of liver stiffness. At least 10 Shear Waves were produced, Individual measurements of each Shear Wave were calculated.  Patient tolerated the procedure well with no complications.  Meets discharge criteria as was dismissed.  Rates pain 0 out of 10.  Patient will follow up with ordering provider to review results.

## 2023-07-21 NOTE — PROGRESS NOTES
Patient presented in clinic today for fibroscan examination of their liver. Patient verbalized that they have fasted 4 hours prior to their examination. Patient denies being pregnant or having any active implantable metal devices; such as a pacemaker, defibrillator, or pump.     KONRAD LozaN, RN  RN Nurse Navigator  Hepatology Department  [facility] [place] [facility]

## 2023-07-21 NOTE — TELEPHONE ENCOUNTER
Pam Strong PA-C  You 16 hours ago (4:52 PM)       I was going to add SPEP and ARABELLA but did that on 7/10,  No need to repeat

## 2023-07-24 ENCOUNTER — OFFICE VISIT (OUTPATIENT)
Dept: PRIMARY CARE CLINIC | Facility: CLINIC | Age: 28
End: 2023-07-24
Payer: COMMERCIAL

## 2023-07-24 VITALS
HEIGHT: 66 IN | BODY MASS INDEX: 35.52 KG/M2 | DIASTOLIC BLOOD PRESSURE: 82 MMHG | HEART RATE: 96 BPM | SYSTOLIC BLOOD PRESSURE: 124 MMHG | WEIGHT: 221 LBS | TEMPERATURE: 98 F

## 2023-07-24 DIAGNOSIS — G89.29 CHRONIC RUQ PAIN: ICD-10-CM

## 2023-07-24 DIAGNOSIS — E66.09 CLASS 2 OBESITY DUE TO EXCESS CALORIES WITH BODY MASS INDEX (BMI) OF 35.0 TO 35.9 IN ADULT, UNSPECIFIED WHETHER SERIOUS COMORBIDITY PRESENT: ICD-10-CM

## 2023-07-24 DIAGNOSIS — E78.2 MIXED HYPERLIPIDEMIA: ICD-10-CM

## 2023-07-24 DIAGNOSIS — R10.11 CHRONIC RUQ PAIN: ICD-10-CM

## 2023-07-24 DIAGNOSIS — R63.5 ABNORMAL WEIGHT GAIN: ICD-10-CM

## 2023-07-24 DIAGNOSIS — K76.0 FATTY LIVER: Primary | ICD-10-CM

## 2023-07-24 PROBLEM — E66.01 CLASS 2 SEVERE OBESITY DUE TO EXCESS CALORIES WITH SERIOUS COMORBIDITY AND BODY MASS INDEX (BMI) OF 36.0 TO 36.9 IN ADULT: Status: RESOLVED | Noted: 2023-06-13 | Resolved: 2023-07-24

## 2023-07-24 PROBLEM — E66.812 CLASS 2 SEVERE OBESITY DUE TO EXCESS CALORIES WITH SERIOUS COMORBIDITY AND BODY MASS INDEX (BMI) OF 36.0 TO 36.9 IN ADULT: Status: RESOLVED | Noted: 2023-06-13 | Resolved: 2023-07-24

## 2023-07-24 PROCEDURE — 99214 OFFICE O/P EST MOD 30 MIN: CPT | Mod: S$GLB,,, | Performed by: NURSE PRACTITIONER

## 2023-07-24 PROCEDURE — 1160F PR REVIEW ALL MEDS BY PRESCRIBER/CLIN PHARMACIST DOCUMENTED: ICD-10-PCS | Mod: CPTII,S$GLB,, | Performed by: NURSE PRACTITIONER

## 2023-07-24 PROCEDURE — 1159F PR MEDICATION LIST DOCUMENTED IN MEDICAL RECORD: ICD-10-PCS | Mod: CPTII,S$GLB,, | Performed by: NURSE PRACTITIONER

## 2023-07-24 PROCEDURE — 3044F PR MOST RECENT HEMOGLOBIN A1C LEVEL <7.0%: ICD-10-PCS | Mod: CPTII,S$GLB,, | Performed by: NURSE PRACTITIONER

## 2023-07-24 PROCEDURE — 99999 PR PBB SHADOW E&M-EST. PATIENT-LVL III: CPT | Mod: PBBFAC,,, | Performed by: NURSE PRACTITIONER

## 2023-07-24 PROCEDURE — 99214 PR OFFICE/OUTPT VISIT, EST, LEVL IV, 30-39 MIN: ICD-10-PCS | Mod: S$GLB,,, | Performed by: NURSE PRACTITIONER

## 2023-07-24 PROCEDURE — 3044F HG A1C LEVEL LT 7.0%: CPT | Mod: CPTII,S$GLB,, | Performed by: NURSE PRACTITIONER

## 2023-07-24 PROCEDURE — 1159F MED LIST DOCD IN RCRD: CPT | Mod: CPTII,S$GLB,, | Performed by: NURSE PRACTITIONER

## 2023-07-24 PROCEDURE — 3074F PR MOST RECENT SYSTOLIC BLOOD PRESSURE < 130 MM HG: ICD-10-PCS | Mod: CPTII,S$GLB,, | Performed by: NURSE PRACTITIONER

## 2023-07-24 PROCEDURE — 1160F RVW MEDS BY RX/DR IN RCRD: CPT | Mod: CPTII,S$GLB,, | Performed by: NURSE PRACTITIONER

## 2023-07-24 PROCEDURE — 3079F DIAST BP 80-89 MM HG: CPT | Mod: CPTII,S$GLB,, | Performed by: NURSE PRACTITIONER

## 2023-07-24 PROCEDURE — 3074F SYST BP LT 130 MM HG: CPT | Mod: CPTII,S$GLB,, | Performed by: NURSE PRACTITIONER

## 2023-07-24 PROCEDURE — 3008F PR BODY MASS INDEX (BMI) DOCUMENTED: ICD-10-PCS | Mod: CPTII,S$GLB,, | Performed by: NURSE PRACTITIONER

## 2023-07-24 PROCEDURE — 3079F PR MOST RECENT DIASTOLIC BLOOD PRESSURE 80-89 MM HG: ICD-10-PCS | Mod: CPTII,S$GLB,, | Performed by: NURSE PRACTITIONER

## 2023-07-24 PROCEDURE — 99999 PR PBB SHADOW E&M-EST. PATIENT-LVL III: ICD-10-PCS | Mod: PBBFAC,,, | Performed by: NURSE PRACTITIONER

## 2023-07-24 PROCEDURE — 3008F BODY MASS INDEX DOCD: CPT | Mod: CPTII,S$GLB,, | Performed by: NURSE PRACTITIONER

## 2023-07-24 NOTE — PROGRESS NOTES
Spoke with patient on [phone], in reference to results and further recommendations. Patient voices understanding.

## 2023-07-24 NOTE — PROGRESS NOTES
Chief Complaint  Chief Complaint   Patient presents with    Follow-up         HPI     HPI  Charlotte Patricia is a 28 y.o. female with medical diagnoses as listed in the medical history and problem list that presents for Fatty Liver Disease.  This patient is new to me.    Fatty Liver Disease: Elevated Liver enzymes 06/13/2023 and 07/10/2023. Visited with Hepatology 06/27/2023. No lesions or cirrhosis noted on Fibroscan. Will not start Wegovy for Insulin Resistance/Weight Loss at this time. See Hepatology recommendations below. Follow-up by Rheumatology to to rule out autoimmune inflammatory disease or hereditary fatty liver disease. No definitive  diagnosis of SLE at this time however, there are elevated inflammatory markers noted in chart. Follow-up with Rheumatology apporx 11/10/2023 with repeat labs.      Recommendations:  - Educated patient on spectrum of fatty liver disease and potential for cirrhosis if PATINO present  - Advised weight loss (10%), strict glycemic control and lipid control (statins are ok if needed, prescribing doctor will need to monitor LFTs per routine)  - Mediterranean diet discussed  - will obtain additional labs to R/O other etiologies of liver disease        History     PAST MEDICAL HISTORY:  Past Medical History:   Diagnosis Date    Acne        PAST SURGICAL HISTORY:  Past Surgical History:   Procedure Laterality Date    NOSE SURGERY      ROBOT-ASSISTED CHOLECYSTECTOMY USING DA NICK XI N/A 1/7/2022    Procedure: XI ROBOTIC CHOLECYSTECTOMY;  Surgeon: Ernesto Plaza MD;  Location: [facility];  Service: General;  Laterality: N/A;       SOCIAL HISTORY:  Social History     Socioeconomic History    Marital status:     Number of children: 0   Occupational History    Occupation: Student- Civil Engineering [employer]   Tobacco Use    Smoking status: Never    Smokeless tobacco: Never   Substance and Sexual Activity    Alcohol use: Yes     Alcohol/week: 2.0 standard drinks     Types: 1 Glasses of  "wine, 1 Shots of liquor per week     Comment: occas    Drug use: No    Sexual activity: Yes     Partners: Male     Birth control/protection: Pill, OCP   Social History Narrative    ** Merged History Encounter **            FAMILY HISTORY:  Family History   Problem Relation Age of Onset    Hyperlipidemia Mother     Hypertension Father     Heart disease Maternal Aunt     Heart disease Maternal Uncle     Cancer Paternal Uncle         bone    Heart disease Maternal Grandmother     Hypertension Maternal Grandfather     Cancer Maternal Grandfather     Colon cancer Paternal Grandfather        ALLERGIES AND MEDICATIONS: updated and reviewed.  Review of patient's allergies indicates:   Allergen Reactions    Sulfa (sulfonamide antibiotics) Rash     Current Outpatient Medications   Medication Sig Dispense Refill    predniSONE (DELTASONE) 10 MG tablet Take 2 tablets (20 mg total) by mouth once daily for 8 days, THEN 1.5 tablets (15 mg total) once daily for 8 days, THEN 1 tablet (10 mg total) once daily for 8 days, THEN 0.5 tablets (5 mg total) once daily for 8 days. 40 tablet 0    VIENVA 0.1-20 mg-mcg per tablet Take 1 tablet by mouth once daily. Patient to take continously. Ok to refill sooner than every three months. 90 tablet 3     No current facility-administered medications for this visit.     Facility-Administered Medications Ordered in Other Visits   Medication Dose Route Frequency Provider Last Rate Last Admin    lactated ringers infusion   Intravenous Continuous Marj Dejesus MD   New Bag at 02/02/22 0700           Exam     ROS  Review of Systems        Physical Exam  Vitals:    07/24/23 1138   BP: 124/82   Pulse: 96   Temp: 97.5 °F (36.4 °C)   Weight: 100.3 kg (221 lb 0.2 oz)   Height: 5' 6" (1.676 m)    Body mass index is 35.67 kg/m².  Weight: 100.3 kg (221 lb 0.2 oz)   Height: 5' 6" (167.6 cm)   Physical Exam        Health Maintenance         Date Due Completion Date    Pneumococcal Vaccines (Age 0-64) (1 - PCV) " Never done ---    HIV Screening Never done ---    TETANUS VACCINE 11/21/2016 11/21/2006    COVID-19 Vaccine (4 - Pfizer series) 02/24/2022 12/30/2021    Influenza Vaccine (1) 09/01/2023 12/30/2021    Pap Smear 05/18/2026 5/18/2023              Assessment & Plan     Assessment & Plan  Problem List Items Addressed This Visit          Endocrine    Abnormal weight gain       GI    Fatty liver - Primary    Overview     fatty liver on u/s, still present, not improved, some with menses and worse with cycle and alcohol. setup hida. f/u afterwards, if hida neg then gyn          Relevant Orders    Ambulatory referral/consult to [facility]    Comprehensive Metabolic Panel    Lipid Panel    Chronic RUQ pain    Overview     fatty liver on u/s, still present, not improved, some with menses and worse with cycle and alcohol. setup hida. f/u afterwards, if hida neg then gyn           Other Visit Diagnoses       Mixed hyperlipidemia        Relevant Orders    Ambulatory referral/consult to [facility]    Comprehensive Metabolic Panel    Lipid Panel    Class 2 obesity due to excess calories with body mass index (BMI) of 35.0 to 35.9 in adult, unspecified whether serious comorbidity present        Relevant Orders    Ambulatory referral/consult to [facility]              Health Maintenance reviewed: Deferred at this time    Follow-up: Approx 6 weeks for Fatty Liver Disease/Repeat Lipid panel and CMP    30+ minutes of total time spent on the encounter, which includes face to face time and non-face to face time preparing to see the patient (eg, review of tests), Obtaining and/or reviewing separately obtained history, documenting clinical information in the electronic or other health record, independently interpreting results (not separately reported) and communicating results to the patient/family/caregiver, or Care coordination (not separately reported).

## 2023-07-24 NOTE — PATIENT INSTRUCTIONS
Meal Ideas for Regular Bariatric Diet/ or high protein diet plan from [facility]  *Recipes and products available at www.bariatriceating.com  Menu Plan: 3041-8006 Calories;  grams of Protein     DAY 1     Breakfast  ½ cup 2% cottage cheese (or  zero sugar oikos greek yogurt 6oz)  ¼ cup fruit (no sugar added)     Snack  2% mozzarella string cheese  10-20 grapes     Lunch  4-6oz Lean hamburger or turkey cheyenne  1 slice low-fat cheese  ¼-1/2 cup green beans     Snack  200 calorie low-carb protein drink (4 grams sugar or less)     Dinner  4oz chicken thigh/ breast  ¼-1 cup cooked spinach      Snack  Atkins bar (15g protein)        DAY 2     Breakfast  1 -2egg with 1oz shredded cheddar cheese and 2T salsa     Snack  200 calorie low-carb protein drink (4 grams sugar or less)     Lunch  Lettuce Wraps: 4oz sliced turkey, 1 slice low-fat Swiss cheese, tomato, and mustard wrapped in a Harsh lettuce leaf     Snack  ½ cup low-fat cottage cheese _(yogurt)  Pear cup (no sugar added)     Dinner  6oz baked fish  ½ cup cooked broccoli     Snack  Sugar-free pudding cup        DAY 3     Breakfast   ½ cup low-fat ricotta cheese w/ Splenda to be  ½ scoop Vanilla protein powder   ¼ cup fresh fruit     Snack  2% string cheese  20 unsalted almonds     Lunch  Tuna/Chicken Salad: 4-6oz canned tuna/chicken, 1 egg white, and 1 tsp light wiggins  Pineapple cup (no sugar added)     Snack  200 calorie low-carb protein drink (4 grams sugar or less)     Dinner  ½ -1baked pork chop   ¼ cup beans        DAY 4     Breakfast  200 calorie low-carb protein drink (4 grams sugar or less) ( premier protein)     Snack  Boiled egg 1-2     Lunch  ½-1 cup grilled shrimp  Salad w/ 2 tbsp crumbled fat-free feta  1 tbsp light vinaigrette     Snack  200 calorie low-carb protein drink (4 grams sugar or less)     Dinner  ¾ -1cup red beans     Snack  Mini Babybell light        DAY 5     Breakfast  [place] pie: 3oz Greek yogurt, 1 tbsp Splenda, ½  individual pack Crystal Light lemonade. Top with ¼ cup chopped walnuts      Snack  3-4 lean ham or turkey slices, ¼ - ½ cup fruit     Lunch  Fiesta Chicken: 2oz canned chicken, 1oz shredded cheddar cheese, ¼ cup black beans  Top with 2 tbsp salsa and a small dollop light sour cream     Snack  200 calorie low-carb protein drink (4 grams sugar or less)     Dinner  Omelette: ¼ cup Egg Beaters, 4 large (1oz) shrimp, 1oz shredded low-fat cheese. Add bell pepper, onion, mushrooms, green onions, or salsa, optional.        DAY 6     Breakfast  1 chang or 2 links turkey sausage  ½ cup fruit     Snack  200 calorie low-carb protein drink (4 grams sugar or less)     Lunch  Grilled tilapia  Salad of baby spinach leaves with light dressing     Snack  200 calorie low-carb protein drink (4 grams sugar or less)     Dinner  Chicken thigh simmered in 98% fat free cream of mushroom soup  ½ -1cup cooked green beans     Meal Ideas for Regular Bariatric Diet  *Recipes and products available at www.bariatriceating.com        Breakfast: (15-20g protein)    - Egg white omelet: 2 egg whites or ½ cup Egg Beaters. (Optional proteins: cheese, shrimp, black beans, chicken, sliced turkey) (Optional veggies: tomatoes, salsa, spinach, mushrooms, onions, green peppers, or small slice avocado)     - Egg and sausage: 1 egg or ¼ cup Egg Beaters (any variety), with 1 chang or 2 links of Turkey sausage or Veggie breakfast sausage (Elite Form or DeliveryCheetah)     - Crust-less breakfast quiche: To make a glass pie dish, mix 4oz part skim Ricotta, 1 cup skim milk, and 2 eggs as your base. Add protein: shredded cheese, sliced lean ham or turkey, turkey grey/sausage. Add veggies: tomato, onion, green onion, mushroom, green pepper, spinach, etc.     - Yogurt parfait: Mix 1 - 6oz container Dannon Light N Fit vanilla yogurt, with ¼ cup crushed unsalted nuts    - Cottage cheese and fruit: ½ cup part-skim cottage cheese or ricotta cheese topped with fresh fruit or  sugar free preserves     - Marj Coleman's Vanilla Egg custard* (add 2 Tbsp instant coffee granules to make Cappuccino Custard*)    - Hi-Protein café latte (skim milk, decaf coffee, 1 scoop protein powder). Optional to add Sugar free syrup or extract flavoring.     - Breakfast Lox: spread fat free cream cheese on slices of smoked salmon. Serve over scrambled or egg over easy (sauteed with nonstick cookspray) OR on a cucumber slice     - Eggwhich: Scramble or cook 1 large egg over easy using nonstick cookspray. Place between 2 slices of Nigerian grey and low fat cheese.     Lunch: (20-30g protein)    - ½ cup Black bean soup (Homemade or Progresso), with ¼ cup shredded low-fat cheese. Top with chopped tomato or fresh salsa.     - Lean deli turkey breast and low-fat sliced cheese, mustard or light wiggins to moisten, rolled up together, or wrapped in a Harsh lettuce leaf    - Chicken salad made from dinner leftovers, moisten with low-fat salad dressing or light wiggins. Also try leftover salmon, shrimp, tuna or boiled eggs. Serve ½ cup over dark green salad     - Fat-free canned refried beans, topped with ¼ cup shredded low-fat cheese. Top with chopped tomato or fresh salsa.      - Greek salad: Top mixed greens with 1-2oz grilled chicken, tomatoes, red onions, 2-3 kalamata olives, and sprinkle lightly with feta cheese. Spritz with Balsamic vinegar to taste.      - Crust-less lunch quiche: To make a glass pie dish, mix 4oz part skim Ricotta, 1 cup skim milk, and 2 eggs as your base. Add protein: shredded cheese, sliced lean ham or turkey, shrimp, chicken. Add veggies: tomato, onion, green onion, mushroom, green pepper, spinach, artichoke, broccoli, etc.    - Pizza bake: spread a  german arabella mushroom with tomato sauce, low-fat shredded mozzarella and turkey pepperoni or [place] grey. Add any veggies. Roast for 10-15 minutes, until cheese melted.      - Cucumber crab bites: Spread ¼ cup crab dip (lump crabmeat + light cream  cheese and green onions) over sliced cucumber.      - Chicken with light spinach and artichoke dip*: Puree in : 6oz cooked and drained spinach, 2 cloves garlic, 1 can cannelloni beans, ½ cup chopped green onions, 1 can drained artichoke hearts (not marinated in oil), lemon juice and basil. Mix in 2oz chopped up chicken.     Supper: (20-30g protein)    - Serve grilled fish over dark green salad tossed with low-fat dressing, served with grilled asparagus spaulding      - Rotisserie chicken salad: served with sliced strawberries, walnuts, fat-free feta cheese crumbles and 1 tbsp Fengs Own Light Raspberry [place] Of Rough Vinaigrette    - Shrimp cocktail: Dip cold boiled shrimp in homemade low-sugar cocktail sauce (1/2 cup Velia One Carb ketchup, 2 tbsp horseradish, 1/4 tsp hot sauce, 1 tsp Worcestershire sauce, 1 tbsp freshly-squeezed lemon juice). Serve with dark green salad, walnuts, and crumbled blue cheese drizzled with olive oil and Balsamic vinegar     - Tuna Melt: Spread tuna salad onto 2 thick slices of tomato. Top with low-fat cheese and broil until cheese is melted. May also be made with chicken salad of shrimp salad. [place] with different types of cheeses.     - Chicken or beef fajitas (no tortilla, rice, beans, chips). Top meat and veggies w/ fresh salsa, fat free sour cream.     - Homemade low-fat Chili using extra lean ground beef or ground turkey. Top with shredded cheese and salsa as desired. May add dollop fat-free sour cream if desired     - Chicken parmesan: Top chicken breast w/ low sugar marinara sauce, mozzarella cheese and bake until chicken reaches 165*.  Serve w/ spaghetti SQUASH or Albanian cut green beans     - Dinner Omelet with shrimp or chicken and onion, green peppers and chives.     - No noodle lasagna: Use sliced zucchini or eggplant in place of noodles.  Layer with part skim ricotta cheese and low sugar meat sauce (use very lean ground beef or ground turkey).     - Mexican  chicken bake: Bake chunks of chicken breast or thigh with taco seasoning, Pace brand enchilada sauce, green onions and low-fat cheese. Serve with ¼ cup black beans or fat free refried beans topped with chopped tomatoes or salsa.    - Octavio frozen meatballs, simmered in Classico Marinara sauce. Different flavors of salsa or spaghetti sauce create different dishes! Sprinkle with parmesan cheese. Serve with grilled or steamed veggies, or a dark green salad.    - Simmer boneless skinless chicken thigh chunks in Classico Marinara sauce or roasted salsa until tender with chopped onion, bell pepper, garlic, mushrooms, spinach, etc.     - Hamburger or veggie burger, without the bun, dressed the way you like. Served with grilled or steamed veggies.     - Eggplant parmesan: Bake slices of eggplant at 350 degrees for 15 minutes. Layer tomato sauce, sliced eggplant and low-fat mozzarella cheese in a baking dish and cover with foil. Bake 30-40 more minutes or until bubbly. Uncover and bake at 400 degrees for about 15 more minutes, or until top is slightly crisp.     - Fish tacos: grilled/baked white fish, wrapped in Harsh lettuce leaf, topped with salsa, shredded low-fat cheese, and light coleslaw.     - Chicken eric: Sprinkle chicken w/ 1 tsp of hidden valley ranch dip mix. Then grill chicken and top with black beans, salsa and 1 tsp fat free sour cream.      - Cauliflower pizza crust: Use cauliflower as crust (see recipe on pinterest, no flour!). Top w/ low fat cheese, turkey pepperoni and veggies and bake again     - chicken or turkey crust pizza: use ground chicken or turkey instead of cauliflower, spread in Platinum and bake at 350 for about 20-30 minutes(may want to add garlic, black pepper, oregano and other herbs to ground meat mixture).  Remove and top w/ low fat cheese, turkey pepperoni and veggies and bake again for another 10 minutes or until cheese is browned.      Snacks: (100-200 calories; >5g protein)      - 1 low-fat cheese stick with 8 cherry tomatoes or 1 serving fresh fruit  - 4 thin slices fat-free turkey breast and 1 slice low-fat cheese  - 4 thin slices fat-free honey ham with wedge of melon  - 6-8 edamame pods (equivalent to about 1/4 cup edamame without pods).   - 1/4 cup unsalted nuts with ½ cup fruit  - 6-oz container Dannon Light n Fit vanilla yogurt, topped with 1oz unsalted nuts         - apple, celery or baby carrots spread with 2 Tbsp PB2  - apple slices with 1 oz slice low-fat cheese  - Apple slices dipped in 2 Tbsp of PB2  - celery, cucumber, bell pepper or baby carrots dipped in ¼ cup hummus bean spread or light spinach and artichoke dip (*recipe in lunch section)  - celery, cucumber, baby carrots dipped in high protein greek yogurt (Mix 16 oz plain greek yogurt + 1 packet of hidden valley ranch dip mix)  - Gary Links Beef Steak - 14g protein! (similar to beef jerky)  - 2 wedges Laughing Cow - Light Herb & Garlic Cheese with sliced cucumber or green bell pepper  - 1/2 cup low-fat cottage cheese with ¼ cup fruit or ¼ cup salsa  - RTD Protein drinks: Atkins, Low Carb Slim Fast, EAS light, Muscle Milk Light, etc.  - Homemade Protein drinks: GNC Soy95, Isopure, Nectar, UNJURY, Whey Gourmet, etc. Mix 1 scoop powder with 8oz skim/1% milk or light soymilk.  - Protein bars: Atkins, EAS, Pure Protein, Think Thin, Detour, etc. Must have 0-4 grams sugar - Read the label.     Takeout Options: No more than twice/week  Deli - Salads (no pasta or rice), meats, cheeses. Roasted chicken. Lox (salmon)     Mexican - Platters which don't include tortillas, chips, or rice. Go easy on the beans. Example: Fajitas without the tortillas. Ask the  not to bring chips to the table if they are too tempting.     Greek - Meat or fish and vegetable, but no bread or rice. Including hummus, baba ganoush, etc, is OK. Most sit-down Greek restaurants can provide you with cucumber slices for dipping instead of theresa bread.      Fast Food (Avoid as much as possible) - Salads (no croutons and limit salad dressing to 2 tbsp), grilled chicken sandwich without the bun and ask for no wiggins. Heydis low fat chili or Taco Bell pintos and cheese.     BBQ - The meats are fine if you ask for sauces on the side, but most of the traditional side dishes are loaded with carbs. Kali slaw, baked beans and BBQ sauce are typically made with sugar.     Chinese - Nothing deep-fried, no rice or noodles. Many Chinese sauces have starch and sugar in them, so you'll have to use your judgement. If you find that these sauces trigger cravings, or cause Dumping, you can ask for the sauce to be made without sugar or just use soy sauce.

## 2023-07-24 NOTE — PROCEDURES
Fibroscan Procedure     Name: Charlotte Patricia  Date of Procedure : 2023   :: SANDEEP Guerra  Diagnosis: NAFLD    Probe: M    Fibroscan readin.0 KPa    Fibrosis:F 0-1     CAP readin dB/m    Steatosis: :S3       Interpretation:   Severe steatosis without fibrosis

## 2023-08-23 ENCOUNTER — LAB VISIT (OUTPATIENT)
Dept: LAB | Facility: HOSPITAL | Age: 28
End: 2023-08-23
Attending: NURSE PRACTITIONER
Payer: COMMERCIAL

## 2023-08-23 ENCOUNTER — PATIENT MESSAGE (OUTPATIENT)
Dept: RHEUMATOLOGY | Facility: CLINIC | Age: 28
End: 2023-08-23
Payer: COMMERCIAL

## 2023-08-23 DIAGNOSIS — E78.2 MIXED HYPERLIPIDEMIA: ICD-10-CM

## 2023-08-23 DIAGNOSIS — K76.0 FATTY LIVER: ICD-10-CM

## 2023-08-23 LAB
ALBUMIN SERPL BCP-MCNC: 3.7 G/DL (ref 3.5–5.2)
ALP SERPL-CCNC: 108 U/L (ref 55–135)
ALT SERPL W/O P-5'-P-CCNC: 85 U/L (ref 10–44)
ANION GAP SERPL CALC-SCNC: 11 MMOL/L (ref 8–16)
AST SERPL-CCNC: 38 U/L (ref 10–40)
BILIRUB SERPL-MCNC: 0.5 MG/DL (ref 0.1–1)
BUN SERPL-MCNC: 13 MG/DL (ref 6–20)
CALCIUM SERPL-MCNC: 9.2 MG/DL (ref 8.7–10.5)
CHLORIDE SERPL-SCNC: 104 MMOL/L (ref 95–110)
CHOLEST SERPL-MCNC: 199 MG/DL (ref 120–199)
CHOLEST/HDLC SERPL: 4.2 {RATIO} (ref 2–5)
CO2 SERPL-SCNC: 23 MMOL/L (ref 23–29)
CREAT SERPL-MCNC: 0.9 MG/DL (ref 0.5–1.4)
EST. GFR  (NO RACE VARIABLE): >60 ML/MIN/1.73 M^2
GLUCOSE SERPL-MCNC: 90 MG/DL (ref 70–110)
HDLC SERPL-MCNC: 47 MG/DL (ref 40–75)
HDLC SERPL: 23.6 % (ref 20–50)
LDLC SERPL CALC-MCNC: 122.2 MG/DL (ref 63–159)
NONHDLC SERPL-MCNC: 152 MG/DL
POTASSIUM SERPL-SCNC: 3.8 MMOL/L (ref 3.5–5.1)
PROT SERPL-MCNC: 8.1 G/DL (ref 6–8.4)
SODIUM SERPL-SCNC: 138 MMOL/L (ref 136–145)
TRIGL SERPL-MCNC: 149 MG/DL (ref 30–150)

## 2023-08-23 PROCEDURE — 80061 LIPID PANEL: CPT | Performed by: NURSE PRACTITIONER

## 2023-08-23 PROCEDURE — 36415 COLL VENOUS BLD VENIPUNCTURE: CPT | Mod: PO | Performed by: NURSE PRACTITIONER

## 2023-08-23 PROCEDURE — 80053 COMPREHEN METABOLIC PANEL: CPT | Performed by: NURSE PRACTITIONER

## 2023-08-25 ENCOUNTER — LAB VISIT (OUTPATIENT)
Dept: LAB | Facility: HOSPITAL | Age: 28
End: 2023-08-25
Attending: FAMILY MEDICINE
Payer: COMMERCIAL

## 2023-08-25 DIAGNOSIS — R76.8 POSITIVE ANA (ANTINUCLEAR ANTIBODY): Primary | ICD-10-CM

## 2023-08-25 DIAGNOSIS — R76.8 DS DNA ANTIBODY POSITIVE: ICD-10-CM

## 2023-08-25 DIAGNOSIS — R70.0 ELEVATED SED RATE: ICD-10-CM

## 2023-08-25 DIAGNOSIS — R76.8 POSITIVE ANA (ANTINUCLEAR ANTIBODY): ICD-10-CM

## 2023-08-25 LAB
ALBUMIN SERPL BCP-MCNC: 3.8 G/DL (ref 3.5–5.2)
ALP SERPL-CCNC: 111 U/L (ref 55–135)
ALT SERPL W/O P-5'-P-CCNC: 101 U/L (ref 10–44)
ANION GAP SERPL CALC-SCNC: 9 MMOL/L (ref 8–16)
AST SERPL-CCNC: 46 U/L (ref 10–40)
BACTERIA #/AREA URNS HPF: ABNORMAL /HPF
BASOPHILS # BLD AUTO: 0.08 K/UL (ref 0–0.2)
BASOPHILS NFR BLD: 0.7 % (ref 0–1.9)
BILIRUB SERPL-MCNC: 0.4 MG/DL (ref 0.1–1)
BUN SERPL-MCNC: 11 MG/DL (ref 6–20)
C3 SERPL-MCNC: 210 MG/DL (ref 50–180)
C4 SERPL-MCNC: 50 MG/DL (ref 11–44)
CALCIUM SERPL-MCNC: 9.7 MG/DL (ref 8.7–10.5)
CHLORIDE SERPL-SCNC: 107 MMOL/L (ref 95–110)
CO2 SERPL-SCNC: 24 MMOL/L (ref 23–29)
CREAT SERPL-MCNC: 0.9 MG/DL (ref 0.5–1.4)
CREAT UR-MCNC: 314 MG/DL (ref 15–325)
CRP SERPL-MCNC: 28.4 MG/L (ref 0–8.2)
DIFFERENTIAL METHOD: NORMAL
EOSINOPHIL # BLD AUTO: 0.1 K/UL (ref 0–0.5)
EOSINOPHIL NFR BLD: 0.8 % (ref 0–8)
ERYTHROCYTE [DISTWIDTH] IN BLOOD BY AUTOMATED COUNT: 12.9 % (ref 11.5–14.5)
ERYTHROCYTE [SEDIMENTATION RATE] IN BLOOD BY PHOTOMETRIC METHOD: 47 MM/HR (ref 0–36)
EST. GFR  (NO RACE VARIABLE): >60 ML/MIN/1.73 M^2
GLUCOSE SERPL-MCNC: 98 MG/DL (ref 70–110)
HCT VFR BLD AUTO: 41.5 % (ref 37–48.5)
HGB BLD-MCNC: 13.9 G/DL (ref 12–16)
IMM GRANULOCYTES # BLD AUTO: 0.04 K/UL (ref 0–0.04)
IMM GRANULOCYTES NFR BLD AUTO: 0.4 % (ref 0–0.5)
LYMPHOCYTES # BLD AUTO: 3.1 K/UL (ref 1–4.8)
LYMPHOCYTES NFR BLD: 28.8 % (ref 18–48)
MCH RBC QN AUTO: 30.1 PG (ref 27–31)
MCHC RBC AUTO-ENTMCNC: 33.5 G/DL (ref 32–36)
MCV RBC AUTO: 90 FL (ref 82–98)
MICROSCOPIC COMMENT: ABNORMAL
MONOCYTES # BLD AUTO: 0.6 K/UL (ref 0.3–1)
MONOCYTES NFR BLD: 5.5 % (ref 4–15)
NEUTROPHILS # BLD AUTO: 6.9 K/UL (ref 1.8–7.7)
NEUTROPHILS NFR BLD: 63.8 % (ref 38–73)
NRBC BLD-RTO: 0 /100 WBC
PLATELET # BLD AUTO: 267 K/UL (ref 150–450)
PMV BLD AUTO: 10.7 FL (ref 9.2–12.9)
POTASSIUM SERPL-SCNC: 3.9 MMOL/L (ref 3.5–5.1)
PROT SERPL-MCNC: 8.4 G/DL (ref 6–8.4)
PROT UR-MCNC: 20 MG/DL (ref 0–15)
PROT/CREAT UR: 0.06 MG/G{CREAT} (ref 0–0.2)
RBC # BLD AUTO: 4.62 M/UL (ref 4–5.4)
RBC #/AREA URNS HPF: 5 /HPF (ref 0–4)
SODIUM SERPL-SCNC: 140 MMOL/L (ref 136–145)
SQUAMOUS #/AREA URNS HPF: 10 /HPF
WBC # BLD AUTO: 10.81 K/UL (ref 3.9–12.7)
WBC #/AREA URNS HPF: 25 /HPF (ref 0–5)
WBC CLUMPS URNS QL MICRO: ABNORMAL

## 2023-08-25 PROCEDURE — 86160 COMPLEMENT ANTIGEN: CPT | Performed by: PHYSICIAN ASSISTANT

## 2023-08-25 PROCEDURE — 86334 PATHOLOGIST INTERPRETATION IFE: ICD-10-PCS | Mod: 26,,, | Performed by: PATHOLOGY

## 2023-08-25 PROCEDURE — 81000 URINALYSIS NONAUTO W/SCOPE: CPT | Performed by: PHYSICIAN ASSISTANT

## 2023-08-25 PROCEDURE — 84165 PATHOLOGIST INTERPRETATION SPE: ICD-10-PCS | Mod: 26,,, | Performed by: PATHOLOGY

## 2023-08-25 PROCEDURE — 82595 ASSAY OF CRYOGLOBULIN: CPT | Performed by: PHYSICIAN ASSISTANT

## 2023-08-25 PROCEDURE — 84156 ASSAY OF PROTEIN URINE: CPT | Performed by: PHYSICIAN ASSISTANT

## 2023-08-25 PROCEDURE — 86225 DNA ANTIBODY NATIVE: CPT | Performed by: PHYSICIAN ASSISTANT

## 2023-08-25 PROCEDURE — 86140 C-REACTIVE PROTEIN: CPT | Performed by: PHYSICIAN ASSISTANT

## 2023-08-25 PROCEDURE — 85652 RBC SED RATE AUTOMATED: CPT | Performed by: PHYSICIAN ASSISTANT

## 2023-08-25 PROCEDURE — 85025 COMPLETE CBC W/AUTO DIFF WBC: CPT | Performed by: PHYSICIAN ASSISTANT

## 2023-08-25 PROCEDURE — 36415 COLL VENOUS BLD VENIPUNCTURE: CPT | Performed by: PHYSICIAN ASSISTANT

## 2023-08-25 PROCEDURE — 86225 DNA ANTIBODY NATIVE: CPT | Mod: 59 | Performed by: PHYSICIAN ASSISTANT

## 2023-08-25 PROCEDURE — 84165 PROTEIN E-PHORESIS SERUM: CPT | Mod: 26,,, | Performed by: PATHOLOGY

## 2023-08-25 PROCEDURE — 86334 IMMUNOFIX E-PHORESIS SERUM: CPT | Mod: 26,,, | Performed by: PATHOLOGY

## 2023-08-25 PROCEDURE — 80053 COMPREHEN METABOLIC PANEL: CPT | Performed by: PHYSICIAN ASSISTANT

## 2023-08-25 PROCEDURE — 84165 PROTEIN E-PHORESIS SERUM: CPT | Performed by: PHYSICIAN ASSISTANT

## 2023-08-25 PROCEDURE — 86160 COMPLEMENT ANTIGEN: CPT | Mod: 59 | Performed by: PHYSICIAN ASSISTANT

## 2023-08-25 PROCEDURE — 86334 IMMUNOFIX E-PHORESIS SERUM: CPT | Performed by: PHYSICIAN ASSISTANT

## 2023-08-25 RX ORDER — HYDROXYCHLOROQUINE SULFATE 200 MG/1
200 TABLET, FILM COATED ORAL 2 TIMES DAILY
Qty: 60 TABLET | Refills: 5 | Status: SHIPPED | OUTPATIENT
Start: 2023-08-25 | End: 2024-01-23 | Stop reason: SDUPTHER

## 2023-08-28 LAB
ALBUMIN SERPL ELPH-MCNC: 3.97 G/DL (ref 3.35–5.55)
ALPHA1 GLOB SERPL ELPH-MCNC: 0.4 G/DL (ref 0.17–0.41)
ALPHA2 GLOB SERPL ELPH-MCNC: 0.88 G/DL (ref 0.43–0.99)
B-GLOBULIN SERPL ELPH-MCNC: 1.05 G/DL (ref 0.5–1.1)
GAMMA GLOB SERPL ELPH-MCNC: 1.29 G/DL (ref 0.67–1.58)
INTERPRETATION SERPL IFE-IMP: NORMAL
PROT SERPL-MCNC: 7.6 G/DL (ref 6–8.4)

## 2023-08-29 LAB
DNA TITER: NORMAL
DSDNA AB SER-ACNC: POSITIVE [IU]/ML
PATHOLOGIST INTERPRETATION IFE: NORMAL
PATHOLOGIST INTERPRETATION SPE: NORMAL

## 2023-09-05 LAB — CRYOGLOB SER QL: NORMAL

## 2023-09-06 ENCOUNTER — PATIENT MESSAGE (OUTPATIENT)
Dept: RHEUMATOLOGY | Facility: CLINIC | Age: 28
End: 2023-09-06
Payer: COMMERCIAL

## 2023-09-19 ENCOUNTER — OFFICE VISIT (OUTPATIENT)
Dept: PRIMARY CARE CLINIC | Facility: CLINIC | Age: 28
End: 2023-09-19
Payer: COMMERCIAL

## 2023-09-19 VITALS
HEART RATE: 99 BPM | TEMPERATURE: 98 F | DIASTOLIC BLOOD PRESSURE: 78 MMHG | BODY MASS INDEX: 35.49 KG/M2 | OXYGEN SATURATION: 98 % | SYSTOLIC BLOOD PRESSURE: 118 MMHG | WEIGHT: 219.94 LBS

## 2023-09-19 DIAGNOSIS — R76.8 ANA POSITIVE: ICD-10-CM

## 2023-09-19 DIAGNOSIS — K76.0 FATTY LIVER: ICD-10-CM

## 2023-09-19 DIAGNOSIS — R30.0 DYSURIA: Primary | ICD-10-CM

## 2023-09-19 DIAGNOSIS — Z23 NEED FOR PNEUMOCOCCAL VACCINATION: ICD-10-CM

## 2023-09-19 DIAGNOSIS — N94.10 DYSPAREUNIA IN FEMALE: ICD-10-CM

## 2023-09-19 DIAGNOSIS — Z23 NEED FOR TDAP VACCINATION: ICD-10-CM

## 2023-09-19 LAB
BILIRUB SERPL-MCNC: NEGATIVE MG/DL
BLOOD URINE, POC: NEGATIVE
COLOR, POC UA: YELLOW
GLUCOSE UR QL STRIP: NEGATIVE
KETONES UR QL STRIP: NEGATIVE
LEUKOCYTE ESTERASE URINE, POC: NORMAL
NITRITE, POC UA: NEGATIVE
PH, POC UA: 5.5
PROTEIN, POC: 30
SPECIFIC GRAVITY, POC UA: 1.03
UROBILINOGEN, POC UA: 0.2

## 2023-09-19 PROCEDURE — 87186 SC STD MICRODIL/AGAR DIL: CPT | Performed by: FAMILY MEDICINE

## 2023-09-19 PROCEDURE — 99214 OFFICE O/P EST MOD 30 MIN: CPT | Mod: 25,S$GLB,, | Performed by: FAMILY MEDICINE

## 2023-09-19 PROCEDURE — 87086 URINE CULTURE/COLONY COUNT: CPT | Performed by: FAMILY MEDICINE

## 2023-09-19 PROCEDURE — 3078F DIAST BP <80 MM HG: CPT | Mod: CPTII,S$GLB,, | Performed by: FAMILY MEDICINE

## 2023-09-19 PROCEDURE — 99999 PR PBB SHADOW E&M-EST. PATIENT-LVL III: CPT | Mod: PBBFAC,,, | Performed by: FAMILY MEDICINE

## 2023-09-19 PROCEDURE — 87088 URINE BACTERIA CULTURE: CPT | Performed by: FAMILY MEDICINE

## 2023-09-19 PROCEDURE — 81001 POCT URINALYSIS, DIPSTICK OR TABLET REAGENT, AUTOMATED, WITH MICROSCOP: ICD-10-PCS | Mod: S$GLB,,, | Performed by: FAMILY MEDICINE

## 2023-09-19 PROCEDURE — 90715 TDAP VACCINE 7 YRS/> IM: CPT | Mod: S$GLB,,, | Performed by: FAMILY MEDICINE

## 2023-09-19 PROCEDURE — 99214 PR OFFICE/OUTPT VISIT, EST, LEVL IV, 30-39 MIN: ICD-10-PCS | Mod: 25,S$GLB,, | Performed by: FAMILY MEDICINE

## 2023-09-19 PROCEDURE — 1159F MED LIST DOCD IN RCRD: CPT | Mod: CPTII,S$GLB,, | Performed by: FAMILY MEDICINE

## 2023-09-19 PROCEDURE — 81001 URINALYSIS AUTO W/SCOPE: CPT | Mod: S$GLB,,, | Performed by: FAMILY MEDICINE

## 2023-09-19 PROCEDURE — 90715 TDAP VACCINE GREATER THAN OR EQUAL TO 7YO IM: ICD-10-PCS | Mod: S$GLB,,, | Performed by: FAMILY MEDICINE

## 2023-09-19 PROCEDURE — G0009 PNEUMOCOCCAL CONJUGATE VACCINE 20-VALENT: ICD-10-PCS | Mod: S$GLB,,, | Performed by: FAMILY MEDICINE

## 2023-09-19 PROCEDURE — 90472 IMMUNIZATION ADMIN EACH ADD: CPT | Mod: S$GLB,,, | Performed by: FAMILY MEDICINE

## 2023-09-19 PROCEDURE — 90677 PCV20 VACCINE IM: CPT | Mod: S$GLB,,, | Performed by: FAMILY MEDICINE

## 2023-09-19 PROCEDURE — G0009 ADMIN PNEUMOCOCCAL VACCINE: HCPCS | Mod: S$GLB,,, | Performed by: FAMILY MEDICINE

## 2023-09-19 PROCEDURE — 3008F PR BODY MASS INDEX (BMI) DOCUMENTED: ICD-10-PCS | Mod: CPTII,S$GLB,, | Performed by: FAMILY MEDICINE

## 2023-09-19 PROCEDURE — 3074F PR MOST RECENT SYSTOLIC BLOOD PRESSURE < 130 MM HG: ICD-10-PCS | Mod: CPTII,S$GLB,, | Performed by: FAMILY MEDICINE

## 2023-09-19 PROCEDURE — 90472 TDAP VACCINE GREATER THAN OR EQUAL TO 7YO IM: ICD-10-PCS | Mod: S$GLB,,, | Performed by: FAMILY MEDICINE

## 2023-09-19 PROCEDURE — 3074F SYST BP LT 130 MM HG: CPT | Mod: CPTII,S$GLB,, | Performed by: FAMILY MEDICINE

## 2023-09-19 PROCEDURE — 87077 CULTURE AEROBIC IDENTIFY: CPT | Performed by: FAMILY MEDICINE

## 2023-09-19 PROCEDURE — 3044F HG A1C LEVEL LT 7.0%: CPT | Mod: CPTII,S$GLB,, | Performed by: FAMILY MEDICINE

## 2023-09-19 PROCEDURE — 99999 PR PBB SHADOW E&M-EST. PATIENT-LVL III: ICD-10-PCS | Mod: PBBFAC,,, | Performed by: FAMILY MEDICINE

## 2023-09-19 PROCEDURE — 3008F BODY MASS INDEX DOCD: CPT | Mod: CPTII,S$GLB,, | Performed by: FAMILY MEDICINE

## 2023-09-19 PROCEDURE — 90677 PNEUMOCOCCAL CONJUGATE VACCINE 20-VALENT: ICD-10-PCS | Mod: S$GLB,,, | Performed by: FAMILY MEDICINE

## 2023-09-19 PROCEDURE — 3044F PR MOST RECENT HEMOGLOBIN A1C LEVEL <7.0%: ICD-10-PCS | Mod: CPTII,S$GLB,, | Performed by: FAMILY MEDICINE

## 2023-09-19 PROCEDURE — 1159F PR MEDICATION LIST DOCUMENTED IN MEDICAL RECORD: ICD-10-PCS | Mod: CPTII,S$GLB,, | Performed by: FAMILY MEDICINE

## 2023-09-19 PROCEDURE — 3078F PR MOST RECENT DIASTOLIC BLOOD PRESSURE < 80 MM HG: ICD-10-PCS | Mod: CPTII,S$GLB,, | Performed by: FAMILY MEDICINE

## 2023-09-19 RX ORDER — NITROFURANTOIN 25; 75 MG/1; MG/1
100 CAPSULE ORAL 2 TIMES DAILY
Qty: 20 CAPSULE | Refills: 0 | Status: SHIPPED | OUTPATIENT
Start: 2023-09-19 | End: 2023-11-10

## 2023-09-19 NOTE — PROGRESS NOTES
Subjective:      Patient ID: Charlotte Patricia is a 28 y.o. female.    Chief Complaint: Urinary Tract Infection (Patient reports having symptoms of UTI a few weeks ago. )      Patient is a 28 y.o. female coming in today for possible UTI.   She had recent urine culture for sx concerning for UTI. Sx included 1 episode of burning dysuria and foul urine smell. Pt is currently sexually active. She is also requesting referral to help with weight loss. She is due for tetanus and PNA vaccine.       1. Dysuria    2. Dyspareunia in female    3. Need for Tdap vaccination    4. Need for pneumococcal vaccination    5. Fatty liver    6. BUSTER positive       No questionnaires on file.    Pmh, Psh, Family Hx, Social Hx, HM updated in Epic Tabs today.   Review of Systems   Constitutional:  Negative for chills, fatigue and fever.   HENT:  Negative for ear pain and trouble swallowing.    Eyes:  Negative for pain and visual disturbance.   Respiratory:  Negative for cough and shortness of breath.    Cardiovascular:  Negative for chest pain and leg swelling.   Gastrointestinal:  Negative for abdominal pain, blood in stool, nausea and vomiting.   Endocrine: Negative for cold intolerance and heat intolerance.   Genitourinary:  Negative for dysuria and frequency.   Musculoskeletal:  Negative for joint swelling, myalgias and neck pain.   Skin:  Negative for color change and rash.   Neurological:  Negative for dizziness and headaches.   Psychiatric/Behavioral:  Negative for behavioral problems and sleep disturbance.      Objective:     Vitals:    09/19/23 0844   BP: 118/78   Pulse: 99   Temp: 97.7 °F (36.5 °C)   SpO2: 98%   Weight: 99.7 kg (219 lb 14.5 oz)     Wt Readings from Last 10 Encounters:   09/19/23 99.7 kg (219 lb 14.5 oz)   07/24/23 100.3 kg (221 lb 0.2 oz)   07/21/23 99.5 kg (219 lb 5.7 oz)   07/10/23 98.9 kg (218 lb 0.6 oz)   06/27/23 99.3 kg (218 lb 14.7 oz)   06/18/23 100.7 kg (222 lb)   06/13/23 101.6 kg (223 lb 15.8 oz)    06/13/22 92.5 kg (204 lb)   05/16/22 92.5 kg (204 lb)   02/02/22 89.9 kg (198 lb 3.1 oz)     Physical Exam  Vitals and nursing note reviewed.   Constitutional:       General: She is awake.      Appearance: Normal appearance. She is well-developed and well-groomed. She is obese.   HENT:      Head: Normocephalic and atraumatic.      Right Ear: Tympanic membrane and external ear normal.      Left Ear: Tympanic membrane and external ear normal.      Nose: Nose normal.   Eyes:      Conjunctiva/sclera: Conjunctivae normal.   Neck:      Thyroid: No thyromegaly or thyroid tenderness.   Cardiovascular:      Rate and Rhythm: Normal rate and regular rhythm.      Heart sounds: Normal heart sounds.   Pulmonary:      Effort: Pulmonary effort is normal. No accessory muscle usage.      Breath sounds: Normal breath sounds.   Musculoskeletal:      Cervical back: Normal range of motion and neck supple.      Comments: Right flank tenderness   Neurological:      General: No focal deficit present.      Mental Status: She is alert. Mental status is at baseline.   Psychiatric:         Attention and Perception: Attention normal.         Mood and Affect: Mood normal.         Speech: Speech normal.         Behavior: Behavior normal. Behavior is cooperative.         Thought Content: Thought content normal.         Judgment: Judgment normal.         Assessment:     1. Dysuria    2. Dyspareunia in female    3. Need for Tdap vaccination    4. Need for pneumococcal vaccination    5. Fatty liver    6. BUSTER positive        Plan:   Charlotte was seen today for urinary tract infection.    Diagnoses and all orders for this visit:    Dysuria  -     POCT urinalysis, dipstick or tablet reag  -     Urine culture; Future  -     nitrofurantoin, macrocrystal-monohydrate, (MACROBID) 100 MG capsule; Take 1 capsule (100 mg total) by mouth 2 (two) times daily.  -     Urine culture    Dyspareunia in female  -     POCT urinalysis, dipstick or tablet reag  -     Urine  culture; Future  -     Urine culture    Need for Tdap vaccination  -     (In Office Administered) Tdap Vaccine    Need for pneumococcal vaccination  -     (In Office Administered) Pneumococcal Conjugate Vaccine (20 Valent) (IM)    Fatty liver    BUSTER positive      Dysuria and dyspareunia are - New Problem, discussed symptoms, work up, suspected diagnosis. Sx likely due to UTI.  New Rx Macrobid 100 mg BID for UTI treatment.   Lab work and urine culture ordered to be completed today.   TDAP and Prevnar 20 vaccines administered in clinic.   Reviewed labs. Discussed f/u with rheum, eye due to plaquenil.   Instructed to f/u in 1 year or sooner if sx persist or worsen.      There are no Patient Instructions on file for this visit.    Follow up in about 1 year (around 9/19/2024), or if symptoms worsen or fail to improve, for physical with Dr GALAVIZ.      LABS:   Lab Results   Component Value Date    HGBA1C 5.6 06/13/2023      Lab Results   Component Value Date    CHOL 199 08/23/2023    CHOL 230 (H) 06/13/2023     Lab Results   Component Value Date    LDLCALC 122.2 08/23/2023    LDLCALC 147.6 06/13/2023     Lab Results   Component Value Date    WBC 10.81 08/25/2023    HGB 13.9 08/25/2023    HCT 41.5 08/25/2023     08/25/2023    CHOL 199 08/23/2023    TRIG 149 08/23/2023    HDL 47 08/23/2023     (H) 08/25/2023    AST 46 (H) 08/25/2023     08/25/2023    K 3.9 08/25/2023     08/25/2023    CREATININE 0.9 08/25/2023    BUN 11 08/25/2023    CO2 24 08/25/2023    TSH 2.244 06/13/2023    INR 0.9 06/27/2023    HGBA1C 5.6 06/13/2023       The ASCVD Risk score (Rafaela DK, et al., 2019) failed to calculate for the following reasons:    The 2019 ASCVD risk score is only valid for ages 40 to 79  X-Ray Chest PA And Lateral  Narrative: EXAM:  XR CHEST PA AND LATERAL    CLINICAL HISTORY:   Shortness of breath.    2 views of the chest.    FINDINGS: The lungs are clear.  The cardiac silhouette and mediastinum are within  normal limits.  Osseous structures and soft tissues are within normal limits.  Impression:   No acute chest findings.    Finalized on: 7/10/2023 1:05 PM By:  Dale Salmon MD  [facility]# [id number]      2023-07-10 13:07:[id number]    [facility]    Scribe Attestation:   I, Juan Medina, am scribing for, and in the presence of, Dr. Love Reynolds MD. I performed the above scribed service and the documentation accurately describes the services I performed. I attest to the accuracy of the note.    I, Dr. Love Reynolds MD, reviewed documentation as scribed above. I personally performed the services described in this documentation.  I agree that the record reflects my personal performance and is accurate and complete. Love Reynolds MD.    09/19/2023

## 2023-09-20 ENCOUNTER — OFFICE VISIT (OUTPATIENT)
Dept: RHEUMATOLOGY | Facility: CLINIC | Age: 28
End: 2023-09-20
Payer: COMMERCIAL

## 2023-09-20 DIAGNOSIS — Z79.899 LONG-TERM USE OF PLAQUENIL: ICD-10-CM

## 2023-09-20 DIAGNOSIS — Z51.81 MEDICATION MONITORING ENCOUNTER: ICD-10-CM

## 2023-09-20 DIAGNOSIS — M32.9 LUPUS ARTHRITIS: Primary | ICD-10-CM

## 2023-09-20 DIAGNOSIS — Z79.899 HIGH RISK MEDICATION USE: ICD-10-CM

## 2023-09-20 PROCEDURE — 99215 OFFICE O/P EST HI 40 MIN: CPT | Mod: 95,,, | Performed by: PHYSICIAN ASSISTANT

## 2023-09-20 PROCEDURE — 1160F RVW MEDS BY RX/DR IN RCRD: CPT | Mod: CPTII,95,, | Performed by: PHYSICIAN ASSISTANT

## 2023-09-20 PROCEDURE — 99215 PR OFFICE/OUTPT VISIT, EST, LEVL V, 40-54 MIN: ICD-10-PCS | Mod: 95,,, | Performed by: PHYSICIAN ASSISTANT

## 2023-09-20 PROCEDURE — 3044F PR MOST RECENT HEMOGLOBIN A1C LEVEL <7.0%: ICD-10-PCS | Mod: CPTII,95,, | Performed by: PHYSICIAN ASSISTANT

## 2023-09-20 PROCEDURE — 1160F PR REVIEW ALL MEDS BY PRESCRIBER/CLIN PHARMACIST DOCUMENTED: ICD-10-PCS | Mod: CPTII,95,, | Performed by: PHYSICIAN ASSISTANT

## 2023-09-20 PROCEDURE — 3044F HG A1C LEVEL LT 7.0%: CPT | Mod: CPTII,95,, | Performed by: PHYSICIAN ASSISTANT

## 2023-09-20 PROCEDURE — 1159F MED LIST DOCD IN RCRD: CPT | Mod: CPTII,95,, | Performed by: PHYSICIAN ASSISTANT

## 2023-09-20 PROCEDURE — 1159F PR MEDICATION LIST DOCUMENTED IN MEDICAL RECORD: ICD-10-PCS | Mod: CPTII,95,, | Performed by: PHYSICIAN ASSISTANT

## 2023-09-20 RX ORDER — PREDNISONE 10 MG/1
TABLET ORAL
Qty: 51 TABLET | Refills: 0 | Status: SHIPPED | OUTPATIENT
Start: 2023-09-20 | End: 2023-10-20

## 2023-09-20 NOTE — PROGRESS NOTES
The patient location is: home  The chief complaint leading to consultation is: f/u leydi    Visit type: audiovisual    Face to Face time with patient: 20 min  40 minutes of total time spent on the encounter, which includes face to face time and non-face to face time preparing to see the patient (eg, review of tests), Obtaining and/or reviewing separately obtained history, Documenting clinical information in the electronic or other health record, Independently interpreting results (not separately reported) and communicating results to the patient/family/caregiver, or Care coordination (not separately reported).         Each patient to whom he or she provides medical services by telemedicine is:  (1) informed of the relationship between the physician and patient and the respective role of any other health care provider with respect to management of the patient; and (2) notified that he or she may decline to receive medical services by telemedicine and may withdraw from such care at any time.    Notes:     Subjective:      Patient ID: Charlotte Patricia is a 28 y.o. female.    Chief Complaint: No chief complaint on file.      HPI   Charlotte Patricia  is a 28 y.o. female seen today for f/u +LEYDI w +dsDNA and joint pain/stiffness.  Initially seen in hepatology for for elevated liver enzymes.  They were able to rule out autoimmune hepatitis.  I had her on a 1 month 20 mg prednisone taper.  Joints are feeling very good at that time.  Also initiated Plaquenil around that time as well.  She noted significant symptomatic improvement with her joints and morning stiffness while on the steroid.  It had worsened once the steroid was complete.  In the last week or so she has begun to see improvement with joint pain and stiffness on Plaquenil.  Overall tolerating Plaquenil fairly well.  She had some diarrhea initially but states that has improved.    Occasional SOB has resolved.  She endorses morning stiffness lasting  approximately 30-45 minutes.  Denies chest pain.  She denies fevers, chills, photosensitivity, eye pain, hematuria, blood in the stool, rash, sicca symptoms, raynauds, finger/oral/nasal ulcerations, LAD, weakness, hair loss.  Rheumatologic systems otherwise negative.    Serologies/Labs:  +BUSTER >=1:2560 (homogenous) and 1:640 speckled  +SSB, +dsDNA (>=1:5120)  Neg anti mitochondrial, anti smooth muscle  Current Treatment:  OTC NSAIDs prn  Previous Treatment:   na      Current Outpatient Medications:     hydrOXYchloroQUINE (PLAQUENIL) 200 mg tablet, Take 1 tablet (200 mg total) by mouth 2 (two) times daily., Disp: 60 tablet, Rfl: 5    nitrofurantoin, macrocrystal-monohydrate, (MACROBID) 100 MG capsule, Take 1 capsule (100 mg total) by mouth 2 (two) times daily., Disp: 20 capsule, Rfl: 0    predniSONE (DELTASONE) 10 MG tablet, Take 4 tablets (40 mg total) by mouth once daily for 3 days, THEN 3 tablets (30 mg total) once daily for 3 days, THEN 2 tablets (20 mg total) once daily for 3 days, THEN 2 tablets (20 mg total) once daily for 3 days, THEN 1 tablet (10 mg total) once daily for 18 days., Disp: 51 tablet, Rfl: 0    VIENVA 0.1-20 mg-mcg per tablet, Take 1 tablet by mouth once daily. Patient to take continously. Ok to refill sooner than every three months., Disp: 90 tablet, Rfl: 3  No current facility-administered medications for this visit.    Facility-Administered Medications Ordered in Other Visits:     lactated ringers infusion, , Intravenous, Continuous, Marj Dejesus MD, New Bag at 02/02/22 0700    Past Medical History:   Diagnosis Date    Acne      Family History   Problem Relation Age of Onset    Hyperlipidemia Mother     Hypertension Father     Heart disease Maternal Aunt     Heart disease Maternal Uncle     Cancer Paternal Uncle         bone    Heart disease Maternal Grandmother     Hypertension Maternal Grandfather     Cancer Maternal Grandfather     Colon cancer Paternal Grandfather      Social History      Socioeconomic History    Marital status:     Number of children: 0   Occupational History    Occupation: Student- [employer]   Tobacco Use    Smoking status: Never    Smokeless tobacco: Never   Substance and Sexual Activity    Alcohol use: Yes     Alcohol/week: 2.0 standard drinks of alcohol     Types: 1 Glasses of wine, 1 Shots of liquor per week     Comment: occas    Drug use: No    Sexual activity: Yes     Partners: Male     Birth control/protection: Pill, OCP   Social History Narrative    ** Merged History Encounter **          Review of patient's allergies indicates:   Allergen Reactions    Sulfa (sulfonamide antibiotics) Rash       Objective:   LMP  (LMP Unknown)   Immunization History   Administered Date(s) Administered    COVID-19, MRNA, LN-S, PF (Pfizer) (Purple Cap) 04/01/2021, 04/22/2021, 12/30/2021    DTP 1995, 1995, 1995, 10/10/1997    DTP / HiB 1995    DTaP 09/28/1999    HIB 1995, 1995, 1995, 10/10/1997    Hepatitis A, Pediatric/Adolescent, 2 Dose 11/21/2006, 05/31/2007    Hepatitis B, Pediatric/Adolescent 1995, 1995, 1995    Influenza (Flumist) - Quadrivalent - Intranasal *Preferred* (2-49 years old) 11/06/2012    Influenza - Intranasal 11/06/2012    Influenza - Intranasal - Trivalent 11/02/2009, 11/17/2010, 10/19/2011    Influenza - Quadrivalent - MDCK - PF 12/30/2021    Influenza - Quadrivalent - PF *Preferred* (6 months and older) 11/18/2019, 11/14/2020    Influenza Split 12/08/2003, 01/12/2004, 11/09/2005, 11/21/2006, 10/30/2007, 10/30/2008    MMR 04/12/1996, 09/28/1999    Meningococcal Conjugate (MCV4P) 11/21/2006    OPV 1995, 1995, 1995, 09/28/1999    Pneumococcal Conjugate - 20 Valent 09/19/2023    Tdap 11/21/2006, 09/19/2023    Varicella 04/12/1996, 02/04/2009       Physical Exam   Constitutional: She is oriented to person, place, and time. No distress.   HENT:   Head: Normocephalic and  atraumatic.   Pulmonary/Chest: Effort normal.   Musculoskeletal:      Cervical back: Normal range of motion.   Neurological: She is alert and oriented to person, place, and time.   Psychiatric: Mood normal.       Recent Results (from the past 672 hour(s))   Comprehensive Metabolic Panel    Collection Time: 08/25/23 11:17 AM   Result Value Ref Range    Sodium 140 136 - 145 mmol/L    Potassium 3.9 3.5 - 5.1 mmol/L    Chloride 107 95 - 110 mmol/L    CO2 24 23 - 29 mmol/L    Glucose 98 70 - 110 mg/dL    BUN 11 6 - 20 mg/dL    Creatinine 0.9 0.5 - 1.4 mg/dL    Calcium 9.7 8.7 - 10.5 mg/dL    Total Protein 8.4 6.0 - 8.4 g/dL    Albumin 3.8 3.5 - 5.2 g/dL    Total Bilirubin 0.4 0.1 - 1.0 mg/dL    Alkaline Phosphatase 111 55 - 135 U/L    AST 46 (H) 10 - 40 U/L     (H) 10 - 44 U/L    eGFR >60 >60 mL/min/1.73 m^2    Anion Gap 9 8 - 16 mmol/L   CBC Auto Differential    Collection Time: 08/25/23 11:17 AM   Result Value Ref Range    WBC 10.81 3.90 - 12.70 K/uL    RBC 4.62 4.00 - 5.40 M/uL    Hemoglobin 13.9 12.0 - 16.0 g/dL    Hematocrit 41.5 37.0 - 48.5 %    MCV 90 82 - 98 fL    MCH 30.1 27.0 - 31.0 pg    MCHC 33.5 32.0 - 36.0 g/dL    RDW 12.9 11.5 - 14.5 %    Platelets 267 150 - 450 K/uL    MPV 10.7 9.2 - 12.9 fL    Immature Granulocytes 0.4 0.0 - 0.5 %    Gran # (ANC) 6.9 1.8 - 7.7 K/uL    Immature Grans (Abs) 0.04 0.00 - 0.04 K/uL    Lymph # 3.1 1.0 - 4.8 K/uL    Mono # 0.6 0.3 - 1.0 K/uL    Eos # 0.1 0.0 - 0.5 K/uL    Baso # 0.08 0.00 - 0.20 K/uL    nRBC 0 0 /100 WBC    Gran % 63.8 38.0 - 73.0 %    Lymph % 28.8 18.0 - 48.0 %    Mono % 5.5 4.0 - 15.0 %    Eosinophil % 0.8 0.0 - 8.0 %    Basophil % 0.7 0.0 - 1.9 %    Differential Method Automated    Sedimentation rate    Collection Time: 08/25/23 11:17 AM   Result Value Ref Range    Sed Rate 47 (H) 0 - 36 mm/Hr   C-Reactive Protein    Collection Time: 08/25/23 11:17 AM   Result Value Ref Range    CRP 28.4 (H) 0.0 - 8.2 mg/L   Anti-DNA Ab, Double-Stranded    Collection  Time: 08/25/23 11:17 AM   Result Value Ref Range    ds DNA Ab Positive (A) Negative 1:10   C4 Complement    Collection Time: 08/25/23 11:17 AM   Result Value Ref Range    Complement (C-4) 50 (H) 11 - 44 mg/dL   C3 Complement    Collection Time: 08/25/23 11:17 AM   Result Value Ref Range    Complement (C-3) 210 (H) 50 - 180 mg/dL   Protein Electrophoresis, Serum    Collection Time: 08/25/23 11:17 AM   Result Value Ref Range    Protein, Serum 7.6 6.0 - 8.4 g/dL    Albumin 3.97 3.35 - 5.55 g/dL    Alpha-1 0.40 0.17 - 0.41 g/dL    Alpha-2 0.88 0.43 - 0.99 g/dL    Beta 1.05 0.50 - 1.10 g/dL    Gamma 1.29 0.67 - 1.58 g/dL   Immunofixation Electrophoresis    Collection Time: 08/25/23 11:17 AM   Result Value Ref Range    Immunofix Interp. SEE COMMENT    Cryoglobulin    Collection Time: 08/25/23 11:17 AM   Result Value Ref Range    Cryoglobulin, Qual Absent Absent   Pathologist Interpretation SPE    Collection Time: 08/25/23 11:17 AM   Result Value Ref Range    Pathologist Interpretation SPE REVIEWED    Pathologist Interpretation ARABELLA    Collection Time: 08/25/23 11:17 AM   Result Value Ref Range    Pathologist Interpretation ARABELLA REVIEWED    DNA Titer    Collection Time: 08/25/23 11:17 AM   Result Value Ref Range    DNA Titer 1:1280    Protein/Creatinine Ratio, Urine    Collection Time: 08/25/23 12:05 PM   Result Value Ref Range    Protein, Urine Random 20 (H) 0 - 15 mg/dL    Creatinine, Urine 314.0 15.0 - 325.0 mg/dL    Prot/Creat Ratio, Urine 0.06 0.00 - 0.20   Urinalysis Microscopic    Collection Time: 08/25/23 12:05 PM   Result Value Ref Range    RBC, UA 5 (H) 0 - 4 /hpf    WBC, UA 25 (H) 0 - 5 /hpf    WBC Clumps, UA Rare None-Rare    Bacteria Few (A) None-Occ /hpf    Squam Epithel, UA 10 /hpf    Microscopic Comment SEE COMMENT    POCT urinalysis, dipstick or tablet reag    Collection Time: 09/19/23  9:36 AM   Result Value Ref Range    Color, UA Yellow     Spec Grav UA 1.030     pH, UA 5.5     WBC, UA trace     Nitrite,  "UA negative     Protein, POC 30     Glucose, UA negative     Ketones, UA negative     Urobilinogen, UA 0.2     Bilirubin, POC negative     Blood, UA negative        No results found for: "TBGOLDPLUS"   Lab Results   Component Value Date    HEPBCAB Non-reactive 06/27/2023    HEPCAB Non-reactive 06/27/2023          Assessment:     1. Lupus arthritis    2. High risk medication use    3. Long-term use of Plaquenil    4. Medication monitoring encounter              Plan:     Diagnoses and all orders for this visit:    Lupus arthritis  -     Thiopurine Methyltrans (TPMT) Genotyping; Future  -     predniSONE (DELTASONE) 10 MG tablet; Take 4 tablets (40 mg total) by mouth once daily for 3 days, THEN 3 tablets (30 mg total) once daily for 3 days, THEN 2 tablets (20 mg total) once daily for 3 days, THEN 2 tablets (20 mg total) once daily for 3 days, THEN 1 tablet (10 mg total) once daily for 18 days.    High risk medication use    Long-term use of Plaquenil    Medication monitoring encounter          Newly diagnosed SLE  Eular Criteria  +BUSTER w   +dsDNA   Lupus arthritis - Joint pain and prolonged am stiffness  Denies sicca sxs, mucosal ulcerations, raynauds, dysphagia, photosensitivity, rash, joint pain  CXR normal last visit  Labs reviewed as above  dsDNA titer improved from >5120 to 1280  CBC  ALT improved  AST wnl  C3/C4 w some elevation  ESR/CRP wnl  No proteinuria  C/w hcq 200 mg bid  Patient instructed to schedule baseline eye exam for Plaquenil monitoring  Again discussed risks of retinal toxicity and need for yearly Plaquenil monitoring  Pt to notify office of changes in sxs  PDN taper as above  Discussed disease course as it pertains to pregnancy  States she would like to consider getting pregnant in the next 1-2 years  Arava not appropriate for consideration of contraception  Patient would need to be off methotrexate x6 months prior to conceiving  Briefly discussed Imuran.  Check TPMT with next labs  We will hold " off adding anything for now.  Drug therapy requiring intensive monitoring for toxicity  High Risk Medication Monitoring encounter  No current medication related issues, no evidence of toxicity  I ordered labs for toxicity monitoring, have personally reviewed the findings, and discussed them with the patient.  Pending labs will be sent via the portal  Compromised immune system secondary to autoimmune disease and/or use of immunosuppressive drugs.  Monitor carefully for infections.  Advised patient to get immediate medical care if any infection arises.  Also advised strict adherence age-appropriate vaccinations and cancer screenings with PCP.  Patient advised to hold DMARD and/or biologic therapy for signs of infection or for surgery. If you are unsure what to do please call our office for instruction.[facility] [phone]  Return to clinic: as scheduled w SLE labs prior    No follow-ups on file.    The patient understands, chooses and consents to this plan and accepts all the risks which include but are not limited to the risks mentioned above.     Disclaimer: This note was prepared using a voice recognition system and is likely to have sound alike errors within the text.

## 2023-09-22 LAB — BACTERIA UR CULT: ABNORMAL

## 2023-09-27 ENCOUNTER — TELEPHONE (OUTPATIENT)
Dept: PRIMARY CARE CLINIC | Facility: CLINIC | Age: 28
End: 2023-09-27
Payer: COMMERCIAL

## 2023-09-27 ENCOUNTER — PATIENT MESSAGE (OUTPATIENT)
Dept: PRIMARY CARE CLINIC | Facility: CLINIC | Age: 28
End: 2023-09-27
Payer: COMMERCIAL

## 2023-10-03 ENCOUNTER — LAB VISIT (OUTPATIENT)
Dept: LAB | Facility: HOSPITAL | Age: 28
End: 2023-10-03
Attending: FAMILY MEDICINE
Payer: COMMERCIAL

## 2023-10-03 ENCOUNTER — OFFICE VISIT (OUTPATIENT)
Dept: PRIMARY CARE CLINIC | Facility: CLINIC | Age: 28
End: 2023-10-03
Payer: COMMERCIAL

## 2023-10-03 VITALS
OXYGEN SATURATION: 99 % | BODY MASS INDEX: 35.12 KG/M2 | HEART RATE: 94 BPM | DIASTOLIC BLOOD PRESSURE: 78 MMHG | RESPIRATION RATE: 18 BRPM | WEIGHT: 218.5 LBS | SYSTOLIC BLOOD PRESSURE: 124 MMHG | TEMPERATURE: 97 F | HEIGHT: 66 IN

## 2023-10-03 DIAGNOSIS — E66.01 CLASS 2 SEVERE OBESITY DUE TO EXCESS CALORIES WITH SERIOUS COMORBIDITY AND BODY MASS INDEX (BMI) OF 35.0 TO 35.9 IN ADULT: ICD-10-CM

## 2023-10-03 DIAGNOSIS — R63.5 WEIGHT GAIN: ICD-10-CM

## 2023-10-03 DIAGNOSIS — R53.83 FATIGUE, UNSPECIFIED TYPE: ICD-10-CM

## 2023-10-03 DIAGNOSIS — K76.0 FATTY LIVER: ICD-10-CM

## 2023-10-03 DIAGNOSIS — K76.0 FATTY LIVER: Primary | ICD-10-CM

## 2023-10-03 LAB
ESTIMATED AVG GLUCOSE: 120 MG/DL (ref 68–131)
HBA1C MFR BLD: 5.8 % (ref 4–5.6)

## 2023-10-03 PROCEDURE — 3074F SYST BP LT 130 MM HG: CPT | Mod: CPTII,S$GLB,, | Performed by: FAMILY MEDICINE

## 2023-10-03 PROCEDURE — 3008F BODY MASS INDEX DOCD: CPT | Mod: CPTII,S$GLB,, | Performed by: FAMILY MEDICINE

## 2023-10-03 PROCEDURE — 3008F PR BODY MASS INDEX (BMI) DOCUMENTED: ICD-10-PCS | Mod: CPTII,S$GLB,, | Performed by: FAMILY MEDICINE

## 2023-10-03 PROCEDURE — 3078F DIAST BP <80 MM HG: CPT | Mod: CPTII,S$GLB,, | Performed by: FAMILY MEDICINE

## 2023-10-03 PROCEDURE — 99999 PR PBB SHADOW E&M-EST. PATIENT-LVL IV: ICD-10-PCS | Mod: PBBFAC,,, | Performed by: FAMILY MEDICINE

## 2023-10-03 PROCEDURE — 99214 OFFICE O/P EST MOD 30 MIN: CPT | Mod: S$GLB,,, | Performed by: FAMILY MEDICINE

## 2023-10-03 PROCEDURE — 3044F HG A1C LEVEL LT 7.0%: CPT | Mod: CPTII,S$GLB,, | Performed by: FAMILY MEDICINE

## 2023-10-03 PROCEDURE — 83525 ASSAY OF INSULIN: CPT | Performed by: FAMILY MEDICINE

## 2023-10-03 PROCEDURE — 36415 COLL VENOUS BLD VENIPUNCTURE: CPT | Performed by: FAMILY MEDICINE

## 2023-10-03 PROCEDURE — 3074F PR MOST RECENT SYSTOLIC BLOOD PRESSURE < 130 MM HG: ICD-10-PCS | Mod: CPTII,S$GLB,, | Performed by: FAMILY MEDICINE

## 2023-10-03 PROCEDURE — 83036 HEMOGLOBIN GLYCOSYLATED A1C: CPT | Performed by: FAMILY MEDICINE

## 2023-10-03 PROCEDURE — 99999 PR PBB SHADOW E&M-EST. PATIENT-LVL IV: CPT | Mod: PBBFAC,,, | Performed by: FAMILY MEDICINE

## 2023-10-03 PROCEDURE — 1159F MED LIST DOCD IN RCRD: CPT | Mod: CPTII,S$GLB,, | Performed by: FAMILY MEDICINE

## 2023-10-03 PROCEDURE — 99214 PR OFFICE/OUTPT VISIT, EST, LEVL IV, 30-39 MIN: ICD-10-PCS | Mod: S$GLB,,, | Performed by: FAMILY MEDICINE

## 2023-10-03 PROCEDURE — 3078F PR MOST RECENT DIASTOLIC BLOOD PRESSURE < 80 MM HG: ICD-10-PCS | Mod: CPTII,S$GLB,, | Performed by: FAMILY MEDICINE

## 2023-10-03 PROCEDURE — 1160F RVW MEDS BY RX/DR IN RCRD: CPT | Mod: CPTII,S$GLB,, | Performed by: FAMILY MEDICINE

## 2023-10-03 PROCEDURE — 3044F PR MOST RECENT HEMOGLOBIN A1C LEVEL <7.0%: ICD-10-PCS | Mod: CPTII,S$GLB,, | Performed by: FAMILY MEDICINE

## 2023-10-03 PROCEDURE — 1159F PR MEDICATION LIST DOCUMENTED IN MEDICAL RECORD: ICD-10-PCS | Mod: CPTII,S$GLB,, | Performed by: FAMILY MEDICINE

## 2023-10-03 PROCEDURE — 1160F PR REVIEW ALL MEDS BY PRESCRIBER/CLIN PHARMACIST DOCUMENTED: ICD-10-PCS | Mod: CPTII,S$GLB,, | Performed by: FAMILY MEDICINE

## 2023-10-03 NOTE — PROGRESS NOTES
Subjective     Works from home  Activity light  Sleep fair  Pt/ to cook in evening  Mom is a dietitian    Patient ID: Charlotte Patricia is a 28 y.o. female.    Chief Complaint: Establish Care (Weight loss)    Fatty liver, autoimmune lupus arthritis. On steroids and plaquenil. Has monitored with rheum.     Has had some weight stabilization.     Labs in June--last A1c was in May.  No hx of metformin use.      Food recall:  Bfast: bagel thin with salmon/arugula  Lunch: same as above  Dinner: orders out  Snack: skinny pop popcorn  Water: adequate (pt feels could do better)  Sugar Sweetened beverages:  tried cutting out regular soda  Zero sugar tea/lemonade  Etoh: none    HPI  Review of Systems   Constitutional:  Negative for activity change, appetite change, fatigue and fever.   HENT:  Negative for mouth dryness, postnasal drip and goiter.    Eyes:  Negative for visual disturbance.   Respiratory:  Negative for apnea, cough, chest tightness and shortness of breath.    Cardiovascular:  Negative for chest pain, palpitations and leg swelling.   Gastrointestinal:  Negative for abdominal pain, constipation, diarrhea, nausea and reflux.   Endocrine: Negative for cold intolerance, heat intolerance, polydipsia, polyphagia and polyuria.   Genitourinary:  Negative for frequency and menstrual problem.   Musculoskeletal:  Positive for arthralgias and myalgias.   Integumentary:  Negative for color change and rash.   Neurological:  Negative for dizziness, seizures, syncope, weakness and headaches.   Psychiatric/Behavioral:  Negative for self-injury, sleep disturbance and suicidal ideas. The patient is not nervous/anxious.           Objective     Vitals:    10/03/23 1450   BP: 124/78   Pulse: 94   Resp: 18   Temp: 97.1 °F (36.2 °C)     Wt Readings from Last 10 Encounters:   10/03/23 99.1 kg (218 lb 7.6 oz)   09/19/23 99.7 kg (219 lb 14.5 oz)   07/24/23 100.3 kg (221 lb 0.2 oz)   07/21/23 99.5 kg (219 lb 5.7 oz)   07/10/23  98.9 kg (218 lb 0.6 oz)   06/27/23 99.3 kg (218 lb 14.7 oz)   06/18/23 100.7 kg (222 lb)   06/13/23 101.6 kg (223 lb 15.8 oz)   06/13/22 92.5 kg (204 lb)   05/16/22 92.5 kg (204 lb)       Physical Exam  Vitals and nursing note reviewed.   Constitutional:       General: She is not in acute distress.  HENT:      Head: Normocephalic and atraumatic.      Mouth/Throat:      Pharynx: Oropharynx is clear.   Eyes:      General: No scleral icterus.     Pupils: Pupils are equal, round, and reactive to light.   Neck:      Comments: No TM  Cardiovascular:      Rate and Rhythm: Normal rate and regular rhythm.      Pulses: Normal pulses.      Heart sounds: Normal heart sounds. No murmur heard.     No friction rub. No gallop.   Pulmonary:      Effort: Pulmonary effort is normal. No respiratory distress.      Breath sounds: Normal breath sounds. No wheezing, rhonchi or rales.   Abdominal:      General: Bowel sounds are normal. There is no distension.      Palpations: Abdomen is soft.      Tenderness: There is no abdominal tenderness.   Musculoskeletal:         General: No swelling.      Cervical back: Normal range of motion and neck supple. No tenderness.   Lymphadenopathy:      Cervical: No cervical adenopathy.   Skin:     General: Skin is warm.      Capillary Refill: Capillary refill takes less than 2 seconds.      Findings: No erythema or rash.   Neurological:      Mental Status: She is alert and oriented to person, place, and time.   Psychiatric:         Mood and Affect: Mood normal.         Behavior: Behavior normal.         PAST MEDICAL HISTORY:  Past Medical History:   Diagnosis Date    Acne          PAST SURGICAL HISTORY:  Past Surgical History:   Procedure Laterality Date    NOSE SURGERY      ROBOT-ASSISTED CHOLECYSTECTOMY USING DA NICK XI N/A 1/7/2022    Procedure: XI ROBOTIC CHOLECYSTECTOMY;  Surgeon: Ernesto Plaza MD;  Location: [facility];  Service: General;  Laterality: N/A;       FAMILY HISTORY:  Family History    Problem Relation Age of Onset    Hyperlipidemia Mother     Hypertension Father     Heart disease Maternal Aunt     Heart disease Maternal Uncle     Cancer Paternal Uncle         bone    Heart disease Maternal Grandmother     Hypertension Maternal Grandfather     Cancer Maternal Grandfather     Colon cancer Paternal Grandfather           SOCIAL HISTORY:  Social History     Social History Narrative    ** Merged History Encounter **            MEDICATIONS:  Medications have been reviewed.    ALLERGIES:  Allergies have been reviewed.       Assessment and Plan       ICD-10-CM ICD-9-CM   1. Fatty liver  K76.0 571.8   2. Fatigue, unspecified type  R53.83 780.79   3. Weight gain  R63.5 783.1   4. Class 2 severe obesity due to excess calories with serious comorbidity and body mass index (BMI) of 35.0 to 35.9 in adult  E66.01 278.01    Z68.35 V85.35        Fatty liver  -     Hemoglobin A1C; Future; Expected date: 10/03/2023  -     Insulin, Random; Future; Expected date: 10/03/2023  -     Ambulatory Referral/Consult to Nutrition Services - [facility]; Future; Expected date: 10/10/2023    Fatigue, unspecified type  -     Ambulatory Referral/Consult to Nutrition Services - [facility]; Future; Expected date: 10/10/2023    Weight gain  -     Hemoglobin A1C; Future; Expected date: 10/03/2023  -     Insulin, Random; Future; Expected date: 10/03/2023    Class 2 severe obesity due to excess calories with serious comorbidity and body mass index (BMI) of 35.0 to 35.9 in adult  -     Ambulatory Referral/Consult to Nutrition Services - [facility]; Future; Expected date: 10/10/2023  Goals dw pt  Glp1 vs metformin     Risks/benefits/common side effects of medication discussed with patient at length. UTD patient handout given. (mychart msg)  D/W pt at length potential pharmacologic options; she desires consideration of ozempic. Risks/benefits/common side effects of ozempic d/w pt including nausea and  pain at injection site; she is aware should not get pregnant while taking and not approved while breastfeeding. NO personal/fam hx of MEN or medullary thyroid cancer. No hx of pancreatitis. Instructed pt how to use with demo pen; pt practiced in office. Pt advised if approved will get pt handout from pharmacy. Pt has no hx of thyroid nodules or biliary disease/gallstones. DW pt with substantial or rapid weight loss gallstones could develop. Also dw pt glp-1 MOA and common side effects.     Consider metformin vs wegovy

## 2023-10-03 NOTE — PATIENT INSTRUCTIONS
"Goal: added sugar <25 grams added sugar/day     Protein goal: 100 grams/day     Schedule eye exam for plaquenil            PRODUCE  [] All fresh fruit   [] All fresh vegetables   [] All fresh herbs  [] All herb purees + pastes  [] Pre-spiralized vegetable noodles   [] Steam-In-The-Bag begetables  [] Riced cauliflower  [] Jicama sticks  [] Love Beets  all varieties  [] Wholly Guacamole  all varieties  [] Hummus  all varieties, chickpea + vegetable  [] Tofu Shirataki noodles    [] Tofu  all varieties  [] Tempeh  all varieties    PROTEIN  CHICKEN   [] Boneless, skinless breasts  [] Boneless, skinless thighs  [] Ground chicken breast, at least 93% lean  [] Chicken breast cutlet  [] Aidell's  Chicken Sausage + Chicken Meatballs    TURKEY   [] Turkey breast tenderloin   [] Ground turkey breast, at least 93% lean  [] Shipshewana Naturals  Turkey Sausage    BEEF  [] Tenderloin  [] Sirloin  [] Top Loin  [] Flank Steak  [] Round Steak  [] Filet  [] Lean ground beef, at least 93% lean + grass-fed preferable    PORK  [] Tenderloin  [] Pork Chop  [] Center Cut  [] Ascencion Naturals  No-Sugar [place]    BISON  [] [place]  90 - 95% lean    SEAFOOD  [] All fresh fish + seafood; locally sourced when possible  [] Smoked salmon    HEAT + EAT ENTREES   [] Badon's Natural Foods  Chicken, Pork, Beef  [] Louie  "All Natural" Grilled Chicken Breast + Strips, all varieties    SAUCES SPREADS + DIPS  [] Bitchin Sauce  Original, Chipotle, Cilantro [place]  [] Yony's Kitchen  Tzatziki Yogurt Dip, Babaganoush, Hummus  [] Wholly Guacamole  all varieties  [] Hummus  all varieties  [] [place]ma Gringo Salsa  all varieties  [] Mrs. Damaris's Salsa  all varieties  [] Stubb's All Natural BBQ Sauce  [] Primal Kitchen  Lemus, Ketchup, BBQ Sauce  [] Primal Kitchen Pasta Sauce  Roasted Garlic, Tomato Basil, no-dairy Vodka Sauce  [] Sal & Ailyn's  HeartSmart Pasta Sauce    DAIRY/DAIRY SUBSTITUTES/EGGS  EGGS   [] All eggs  cage-free, " pasture-raise preferable  [] Crepini  egg wraps  [] Vital Farms  Pasture-Raised Egg Bites  [] JUST Egg [vegan]     CREAMERS   [] Califia  Better Half, original + vanilla unsweetened  [] NutPods  all varieties    MILK   [] Horizon Organic  all varieties except chocolate  [] Organic Valley  all varieties except chocolate  [] Organic Valley  ultra-filtered, reduced fat milk     PLANT_BASED MILK ALTERNATIVES  [] All unsweetened almond milks  original, vanilla + chocolate  [] Ripple  unsweetened   [] Milkadamia  original +_ vanilla, unsweetened   [] Forager  original + vanilla, unsweetened   [] Silk Organic  soy milk, unsweetened  [] Oatly  unsweetened  [] Califia  regular + protein-fortified oat milk, unsweetened     CHEESES  [] Regular or reduced fat cheeses  [] BelGioso  Fresh Mozzarella Snack Packs, Parmesan Power-full Snack   [] Goat cheese  [] Fresh mozzarella  [] String cheese  all varieties  [] Crystal Cottage Cheese  [] Silke's Cultured Cottage Cheese  [] Inga Life 'Just Like Mozzarella'  plant-based shreds and other varieties  [] Parmela Creamery  plant-based shredded cheese    YOGURT  [] Fage  2% low-fat, plain  [] Siggi's  plain, vanilla  [] Chobani Greek  nonfat + whole milk yogurt, plain   [] Chobani Less Sugar  all flavored varieties   [] Oikos Greek  nonfat, plain  [] Two Good  all varieties   [] Tamazight Provisions  plain  [] [place]aby Organic  low-fat + nonfat, plain  [] [facility] Farm  goat milk yogurt, plain  [] Kefit  unsweetened, plain  [] Forager  Greek style unsweetened, plain [dairy-free]  [] [place]  unsweetened Greek style, plain [dairy-free]  [] [facility]  almond milk yogurt, vanilla or plain, unsweetened [dairy-free]    FREEZER SECTION  FROZEN VEGGIES  [] All plain frozen veggies + greens [e.g. broccoli, brussels, carrots, okra, mushrooms, zucchini, yellow squash, butternut squash, kale, spinach, natasha greens]  [] Riced veggies [e.g. cauliflower, broccoli,  butternut squash]  [] Edamame  all varieties  [] Green Giant  [] Veggie Spirals  [] Marinated Veggies [e.g. eggplant, peppers, zucchini]  [] Simply Steam [place] Sprouts  [] Birds Eye  [] Power Blend Italian Style  lentils, broccoli, kale, zucchini  []  [place]ra[place] Farm Sprouts & Carrots  [] Oven Roasters Broccoli & Cauliflower  [] [place] Blend  [] Tattooed   [] Green Bean Blend  [] Farmer's Market Ratatouille  [] Butter Balsamic Glazed Vegetables  [] Riced Cauliflower & Quinoa Mediterranean Style  [] Honey's Good Life  Southern Style Greens [sauteed kale + onion]    FROZEN FRUITS  [] All unsweetened frozen fruits  all varieties  [] Dole Fruits & Veggie Blends  Berries 'n Kale  [] Dole Mix-ins  Triple Berry     FROZEN ENTREES  [] The Good Kitchen meals  all varieties [ e.g. Chili Lime Chicken Over Riced Cauliflower]  [] Premium Paleo  Not Dada Momma's Meatloaf  [] Primal Kitchen  Chicken Pesto + Steak Fajitas w/ Peppers & Onions  [] Eating Well Frozen Entrees  Butter Chicken Masala, Steak Carne Asada, Creamy Pesto Chicken, Chicken + Wild Rice Stroganoff, Yellow Soni Chicken, Sun-dried Tomato Chicken, Chicken Lo Mein  [] Realgood Entree Bowls  Yemeni Inspired Beef Bowl over Riced Cauliflower, Chicken Burrito Bowl   [] Great Karma Coconut Soni  [] Holley's  Tamale Rosanna with Black Beans, Vegetable Lasagna  [] Kashi Mayan Sparta Bake  [] Healthy Choice  Simply Steamers Chicken Fried Rice  [] Basil Pesto Chicken & Samoan Style Pork Power Bowls  [] Tattooed   Enchilada Bowl  [] Meredith Farms  Spicy Black Bean Burgers    FROZEN PIZZAS  [] Cauli'flour Foods  Cauliflower Pizza Crusts  [] Outer Aisle  Cauliflower Crust  [] Holley's  Veggie Crust Cheese Pizza  [] Quest Pizza     VEGETARIAN PRODUCTS  [] Beyond Meat  ground 'meat' + grilled 'chicken' strips  [] Tofurkey  Original Italian Sausage + Original Tempeh  [] Gardein  Beefless Ground + Meatless Meatballs  [] [facility]  Grillers  Original Burger, Crumbles, Meatballs    ICE CREAMS + FROZEN DESSERTS  [] Halo Top  regular + keto series, pops  [] Rebel  ice cream + dessert sandwiches  [] Enlightened  ice cream + bars  [] Nightfood  ice cream  [] Realgood  ice cream  [] Arctic Zero Fit  frozen pint  [] The Frozen Farmer  sorbets  [] Wholly Rollies  Protein Balls, all varieties  [] Dream Pops  Coconut Latte    FROZEN BREAKFASTS  [] Realgood  Breakfast Sandwiches on Cauliflower Cheesy Bread  [] Rebel  ice cream + dessert sandwiches  [] Enlightened  ice cream + bars  [] Nightfood  ice cream  [] Realgood  ice cream  [] Arctic Zero Fit  frozen pint  [] The Frozen Farmer  sorbets  [] Wholly Rollies  Protein Balls, all varieties  [] Dream Pops  Coconut Latte    BREADS/BUNS/WRAPS  [] Tae Bread: All Types - In Freezer Section   [] Flat Out Light Wraps - All Varieties   [] Flat Out Protein Up Carb Down Flat Bread   [] Kontos Whole Wheat Pocket Daphne   [] Frederick GRECIA 100% Whole Wheat Tortillas   [] LaTortilla Factory Tortillas - Smart & Delicious; 50 or 80-calorie   [] Nature's Own 100% Whole Wheat Bread   [] Orowheat Healthful - 100% Whole Wheat Slice Bread and [place] Thins   [] Orowheat Healthful - Whole Wheat Nuts & Grain Bread; Flax & Seed Bread   [] Pepperidge Farm Natural Whole Grain 15 Bread   [] Pepperidge Farm Natural Whole Grain English Muffin - 100% Whole Wheat   [] [place] Farm Very Thin 100% Whole Wheat   [] Yanna Edge 45 Calories and Delightful   [] Jason' 100% Whole Wheat Thin-Sliced Bagels and English Muffins   [] Western Bagel: Perfect 10     GLUTEN FREE  [] Lionel's Gluten Free Bread   [] [place] Bakehouse 7-Grain Gluten Free Bread     LEGUME PASTA   [] Explore Asian Organic Black Bean Spaghetti   [] Modern Table   [] Tolerant Foods       NUT BUTTERS & JELLIES    NUT BUTTERS   [] Better'n Chocolate: Coconut Chocolate Peanut Butter Spread   [] Better'n Peanut Butter - All Types   [] Earth Balance Coconut and  Peanut Spread   [] Sriram's Nut [place]   [] MaraNatha: All Natural Roasted Cashew Butter - Nesbit or Creamy   [] MaraNatha: Roasted Peanut Butter   [] Nuts 'N More Peanut [place] - All Flavors   [] PB2 Powder - Original or Chocolate   [] Skippy Natural - Creamy, Super Chunk   [] Smart Balance Peanut Butter - Nesbit or Creamy   [] Peanut Butter & Company:   [] Smooth , Crunch Time, The Heat Is On, Old Fashioned Smooth, Mighty Nut- Powdered Peanut Butter, Squeeze Pack   [] Smucker's Natural Peanut Butter - Nesbit or Creamy   [] Sunbutter Nut Butter   [] Wild Friends Protein Peanut Butter/[place] o Butter - Vanilla or Chocolate     JELLIES  o Polaner's All Fruit   o Clearly Organic Best Choice: Strawberry Fruit Spread       SNACKS    BARS  [] Kashi Bars - Chewy or Crunchy; Honey [place] o Flax or Peanut Butter   [] KIND Bars - 5 Grams of Sugar or Less   [] KIND Protein Bars - Strong and KIND   [] Nature Valley Protein Bar - All Varieties   [] Nature Valley Roasted Nut Crunch - [place] Crunch; Peanut Crunch   [] [facility] Simple Nut Bar - Roasted Peanut & Honey   [] [facility] Simple Nut Bar - [place], Cashew & Sea Salt   [] [facility] Nut [place] Bar - Salted Caramel Peanut   [] Think Thin Protein Bars   [] Quest Bars, Power Crunch Bars, Pure Protein Bars     BEEF JERKY - NITRATE FREE   [] Game On   [] Grass Run Farms   [] Krave   [] Ostrim   [] Perky Jerky   [] Primal Strips Meatless Vegan Jerky   [] [place]     CHIPS   [] Beanitos Chips   [] Fruit Crisps - e.g. Brother's-All-Natural, Bare Fruit, Yoga Chips   [] Xochilt's Soy Crisps: 1.3 ounce bag   [] Quest Protein Chips   [] Wasa Whole Wheat Crisp Bread     CRACKERS  [] Abeba's Gone Crackers   [] Nabisco Triscuit: Regular and Thin Crisp Crackers   [] Vans Say Cheese Crackers (G-F)     POPCORN/NUTS   [] Clement Ward's Smart Pop Popcorn - Single Serving   [] 100-Calorie Pack of Nuts - All Varieties     PROTEIN POWDERS & DRINKS  []  Protein -   Whey Protein Powder   [] Garden of Life Raw Protein Powder   [] Iconic Ready-To-Drink Protein Drink   [] Tavarez One Protein Powder   [] VegaSport Protein Powder     SALSA/HUMMUS/DIPS   [] Eat Well Embrace Life: Zesty Sriracha Carrot o Hummus   [] Pre-Portioned Guacamole Packs   [] Sonny's   [] Tostitos Restaurant Style Salsa       SOUPS   [] Holley's Organic Soups - Lentil, Vegetable, Split Pea, Low-Sodium     CANNED GOODS   [] 100% Pure Pumpkin   [] BlueRunner Creole Cream-Style Red Beans or Navy Beans   [] Cajun Power Chicken Gumbo Base   [] Chicken of the Sea [place] Rachel   [] En Fresh Cut Sliced Beets   [] Hormel Breast of Chicken in Water   [] LeSuer Tender Baby Whole Carrots   [] Josemanuellscar Tabasco [place] Starter   [] Judahist: Chunk Lite Tuna in Water, Gourmet Select Pouches   [] Judahist: Yellowfin Tuna Fillets   [] Trappey's: Kidney, Butter, [place], Black Eye, Field, and Black Beans   [] GEORGIANA Lara's Turnip Greens or [place] Spinach     CONDIMENTS/ SAUCES/SPREADS/ SPICES  [] Harry Alvarez's Magic Seasonings - Regular or Salt Free   [] Abril Guerra's Sauces - All Flavors   [] Laughing Cow Light - All Flavors   [] Dash Salt-Free Marinade - All Flavors   [] Dipesh & Ailyn's: Heart Smart Pasta Sauces   [] Tabasco     SALAD DRESSINGS  [] Lizzy's Naturals: Lite Honey Mustard   [] Feng's Own: Lighten Up Salad Dressing - All Varieties   [] OPA Greek Yogurt Dressings - Ranch, Blue o Cheese, Caesar, Feta Dill     SWEETENERS  [] Sweet Magdalena Sweetener   [] Swerve   [] Truvia     BEVERAGES  [] Coconut Water   [] Crystal Light PURE - All Flavors   [] Honest Tea: Just Green Tea, Unsweetened   [] Kombucha Tea   [] [place]   [] [place] Sisters Bloody Abeba Mix   [] Metromint - Zero-Sugar; All Natural Flavored   [] Isaura - Plain or Flavored   [] Azalia Young   [] Steaz - Zero-Sugar, All-Natural, Sparkling Tea   [] Tea Bags: Any Brand - e.g. Shanti, Yogi, Tazzo, Celestial   [] V8 100% Vegetable Juice   [] Vitamin  Water Zero   [] Water   [] Zevia - Stevia Sweetened Soft Drink     BEER/AIDA/LIQUORS  []Mosqueda's Premier Light 64 Calories   [] Bud Select - 55 Calories   [] [place] Sisters Bloody Abeba Mix   [] Camacho Genuine Draft - 64 Calories   [] Red or White Wine - All Varieties     CEREALS: HOT/COLD   [] Nazia'rai Henriquez's Original Cereal  [] Jorge A's Mill Oat Bran Hot Cereal - Cracked Wheat, Multi-Grain  [] Kashi GoLean Cereal  [] Kashi GoLean Hot Cereal packets - Vanilla; Honey Cinnamon  [] Linda's Special K Protein Cereal  [] Alcides's Steel Cut Britni Oatmeal  [] Nature's Path Smart Bran  [] Episcopalian Instant Oatmeal packet, Original  [] Episcopalian Old Fashioned Episcopalian Oats  [] Uncle Scar's Whole Wheat & Flaxseed Original Cereal

## 2023-10-04 ENCOUNTER — PATIENT MESSAGE (OUTPATIENT)
Dept: PRIMARY CARE CLINIC | Facility: CLINIC | Age: 28
End: 2023-10-04
Payer: COMMERCIAL

## 2023-10-04 DIAGNOSIS — R73.03 PREDIABETES: Primary | ICD-10-CM

## 2023-10-04 LAB
INSULIN COLLECTION INTERVAL: ABNORMAL
INSULIN SERPL-ACNC: 34.7 UU/ML

## 2023-10-04 RX ORDER — METFORMIN HYDROCHLORIDE 500 MG/1
TABLET, EXTENDED RELEASE ORAL
Qty: 60 TABLET | Refills: 2 | Status: SHIPPED | OUTPATIENT
Start: 2023-10-04 | End: 2023-12-10

## 2023-10-05 ENCOUNTER — PATIENT MESSAGE (OUTPATIENT)
Dept: PRIMARY CARE CLINIC | Facility: CLINIC | Age: 28
End: 2023-10-05
Payer: COMMERCIAL

## 2023-11-03 ENCOUNTER — NUTRITION (OUTPATIENT)
Dept: PRIMARY CARE CLINIC | Facility: CLINIC | Age: 28
End: 2023-11-03
Payer: COMMERCIAL

## 2023-11-03 ENCOUNTER — LAB VISIT (OUTPATIENT)
Dept: LAB | Facility: HOSPITAL | Age: 28
End: 2023-11-03
Attending: PHYSICIAN ASSISTANT
Payer: COMMERCIAL

## 2023-11-03 DIAGNOSIS — M32.9 LUPUS ARTHRITIS: ICD-10-CM

## 2023-11-03 DIAGNOSIS — R76.8 DS DNA ANTIBODY POSITIVE: ICD-10-CM

## 2023-11-03 DIAGNOSIS — R53.83 FATIGUE, UNSPECIFIED TYPE: ICD-10-CM

## 2023-11-03 DIAGNOSIS — R76.8 POSITIVE ANA (ANTINUCLEAR ANTIBODY): ICD-10-CM

## 2023-11-03 DIAGNOSIS — K76.0 FATTY LIVER: ICD-10-CM

## 2023-11-03 DIAGNOSIS — E66.01 CLASS 2 SEVERE OBESITY DUE TO EXCESS CALORIES WITH SERIOUS COMORBIDITY AND BODY MASS INDEX (BMI) OF 35.0 TO 35.9 IN ADULT: ICD-10-CM

## 2023-11-03 LAB
ALBUMIN SERPL BCP-MCNC: 3.8 G/DL (ref 3.5–5.2)
ALP SERPL-CCNC: 83 U/L (ref 55–135)
ALT SERPL W/O P-5'-P-CCNC: 91 U/L (ref 10–44)
AMORPH CRY URNS QL MICRO: ABNORMAL
ANION GAP SERPL CALC-SCNC: 9 MMOL/L (ref 8–16)
AST SERPL-CCNC: 30 U/L (ref 10–40)
BASOPHILS # BLD AUTO: 0.08 K/UL (ref 0–0.2)
BASOPHILS NFR BLD: 0.9 % (ref 0–1.9)
BILIRUB SERPL-MCNC: 0.3 MG/DL (ref 0.1–1)
BUN SERPL-MCNC: 10 MG/DL (ref 6–20)
CALCIUM SERPL-MCNC: 9.4 MG/DL (ref 8.7–10.5)
CHLORIDE SERPL-SCNC: 106 MMOL/L (ref 95–110)
CO2 SERPL-SCNC: 25 MMOL/L (ref 23–29)
CREAT SERPL-MCNC: 1 MG/DL (ref 0.5–1.4)
CREAT UR-MCNC: 182.1 MG/DL (ref 15–325)
CRP SERPL-MCNC: 22.2 MG/L (ref 0–8.2)
DIFFERENTIAL METHOD: NORMAL
EOSINOPHIL # BLD AUTO: 0.1 K/UL (ref 0–0.5)
EOSINOPHIL NFR BLD: 1 % (ref 0–8)
ERYTHROCYTE [DISTWIDTH] IN BLOOD BY AUTOMATED COUNT: 12.4 % (ref 11.5–14.5)
ERYTHROCYTE [SEDIMENTATION RATE] IN BLOOD BY WESTERGREN METHOD: 32 MM/HR (ref 0–20)
EST. GFR  (NO RACE VARIABLE): >60 ML/MIN/1.73 M^2
GLUCOSE SERPL-MCNC: 103 MG/DL (ref 70–110)
HCT VFR BLD AUTO: 43.7 % (ref 37–48.5)
HGB BLD-MCNC: 14.3 G/DL (ref 12–16)
IMM GRANULOCYTES # BLD AUTO: 0.04 K/UL (ref 0–0.04)
IMM GRANULOCYTES NFR BLD AUTO: 0.4 % (ref 0–0.5)
LYMPHOCYTES # BLD AUTO: 2.6 K/UL (ref 1–4.8)
LYMPHOCYTES NFR BLD: 28.1 % (ref 18–48)
MCH RBC QN AUTO: 29.9 PG (ref 27–31)
MCHC RBC AUTO-ENTMCNC: 32.7 G/DL (ref 32–36)
MCV RBC AUTO: 91 FL (ref 82–98)
MICROSCOPIC COMMENT: ABNORMAL
MONOCYTES # BLD AUTO: 0.5 K/UL (ref 0.3–1)
MONOCYTES NFR BLD: 5.2 % (ref 4–15)
NEUTROPHILS # BLD AUTO: 6 K/UL (ref 1.8–7.7)
NEUTROPHILS NFR BLD: 64.4 % (ref 38–73)
NRBC BLD-RTO: 0 /100 WBC
PLATELET # BLD AUTO: 272 K/UL (ref 150–450)
PMV BLD AUTO: 10.8 FL (ref 9.2–12.9)
POTASSIUM SERPL-SCNC: 3.7 MMOL/L (ref 3.5–5.1)
PROT SERPL-MCNC: 8 G/DL (ref 6–8.4)
PROT UR-MCNC: 9 MG/DL (ref 0–15)
PROT/CREAT UR: 0.05 MG/G{CREAT} (ref 0–0.2)
RBC # BLD AUTO: 4.78 M/UL (ref 4–5.4)
SODIUM SERPL-SCNC: 140 MMOL/L (ref 136–145)
WBC # BLD AUTO: 9.25 K/UL (ref 3.9–12.7)

## 2023-11-03 PROCEDURE — 81000 URINALYSIS NONAUTO W/SCOPE: CPT | Performed by: PHYSICIAN ASSISTANT

## 2023-11-03 PROCEDURE — 86160 COMPLEMENT ANTIGEN: CPT | Performed by: PHYSICIAN ASSISTANT

## 2023-11-03 PROCEDURE — 0034U TPMT NUDT15 GENES: CPT | Performed by: PHYSICIAN ASSISTANT

## 2023-11-03 PROCEDURE — 84156 ASSAY OF PROTEIN URINE: CPT | Performed by: PHYSICIAN ASSISTANT

## 2023-11-03 PROCEDURE — 80053 COMPREHEN METABOLIC PANEL: CPT | Performed by: PHYSICIAN ASSISTANT

## 2023-11-03 PROCEDURE — 86225 DNA ANTIBODY NATIVE: CPT | Performed by: PHYSICIAN ASSISTANT

## 2023-11-03 PROCEDURE — 99999 PR PBB SHADOW E&M-EST. PATIENT-LVL II: CPT | Mod: PBBFAC,,, | Performed by: DIETITIAN, REGISTERED

## 2023-11-03 PROCEDURE — 99999 PR PBB SHADOW E&M-EST. PATIENT-LVL II: ICD-10-PCS | Mod: PBBFAC,,, | Performed by: DIETITIAN, REGISTERED

## 2023-11-03 PROCEDURE — 97802 MEDICAL NUTRITION INDIV IN: CPT | Mod: S$GLB,,, | Performed by: DIETITIAN, REGISTERED

## 2023-11-03 PROCEDURE — 86225 DNA ANTIBODY NATIVE: CPT | Mod: 59 | Performed by: PHYSICIAN ASSISTANT

## 2023-11-03 PROCEDURE — 97802 PR MED NUTR THER, 1ST, INDIV, EA 15 MIN: ICD-10-PCS | Mod: S$GLB,,, | Performed by: DIETITIAN, REGISTERED

## 2023-11-03 PROCEDURE — 86160 COMPLEMENT ANTIGEN: CPT | Mod: 59 | Performed by: PHYSICIAN ASSISTANT

## 2023-11-03 PROCEDURE — 85651 RBC SED RATE NONAUTOMATED: CPT | Performed by: PHYSICIAN ASSISTANT

## 2023-11-03 PROCEDURE — 86140 C-REACTIVE PROTEIN: CPT | Performed by: PHYSICIAN ASSISTANT

## 2023-11-03 PROCEDURE — 85025 COMPLETE CBC W/AUTO DIFF WBC: CPT | Performed by: PHYSICIAN ASSISTANT

## 2023-11-03 NOTE — PROGRESS NOTES
"Nutrition Assessment    Visit Type: Insurance initial  Session Time:  2 Hours  Reason for MNT visit: Pt in for education and nutrition counseling regarding Fatty Liver and Obesity and Insulin Resistance and Inflammation.     Age: 28 y.o.  Wt:   Wt Readings from Last 10 Encounters:   10/03/23 99.1 kg (218 lb 7.6 oz)   09/19/23 99.7 kg (219 lb 14.5 oz)   07/24/23 100.3 kg (221 lb 0.2 oz)   07/21/23 99.5 kg (219 lb 5.7 oz)   07/10/23 98.9 kg (218 lb 0.6 oz)   06/27/23 99.3 kg (218 lb 14.7 oz)   06/18/23 100.7 kg (222 lb)   06/13/23 101.6 kg (223 lb 15.8 oz)   06/13/22 92.5 kg (204 lb)   05/16/22 92.5 kg (204 lb)     Ht:   Ht Readings from Last 1 Encounters:   10/03/23 5' 6" (1.676 m)     BMI:   BMI Readings from Last 5 Encounters:   10/03/23 35.26 kg/m²   09/19/23 35.49 kg/m²   07/24/23 35.67 kg/m²   07/21/23 35.41 kg/m²   07/10/23 35.19 kg/m²       Client states:  She has recently been diagnosed with fatty liver, lupus, insulin resistance, and prediabetes. She feels very overwhelmed with all of the nutrition information. Mom is a RD, but does not work in lifestyle space. She also has been battling depression; does see therapist. Does not cook much at home maybe 1 night a week;  and self mostly dine out or [employer] most dinners. She does make breakfast and lunches at home most days. Currently walks for 30 minutes 1-2 times per week with a goal of walking 30 minutes most days. Does struggle with guilt around food choices. She has been on steroids for past few months, but has been able to maintain weight.   Discussed:  Fiber + fat + protein at most meals  Getting at least 20-30 grams of protein at breakfast and 30-40 grams of protein at lunch/dinner  Minimizing added sugars + fried foods + fatty foods for fatty liver  Choosing 1 starch per meal (example if wanting sandwich having lower carbohydrate side)  Being mindful of carbohydrates while on steroids for an extended period  Increased protein needs  Hydration " needs  Filling 1/2 plate with non-starchy vegetables  Did not discuss meal planning guide, or nutrition/, fueling on the go guide, physical activity recommendations    Vitamins, Minerals, and/or Supplements:  not discussed     Medical History  Problem List             Resolved    Acne         Fatty liver         Chronic RUQ pain         Nausea         Hypertrophy of both inferior nasal turbinates         Nasal obstruction         Nasal septal deviation         Class 2 severe obesity due to excess calories with serious comorbidity and body mass index (BMI) of 35.0 to 35.9 in adult         Abnormal weight gain         Fatigue         Decreased libido         BUSTER positive            Past Medical History:   Diagnosis Date    Acne        Past Surgical History:   Procedure Laterality Date    NOSE SURGERY      ROBOT-ASSISTED CHOLECYSTECTOMY USING DA NICK XI N/A 1/7/2022    Procedure: XI ROBOTIC CHOLECYSTECTOMY;  Surgeon: Ernesto Plaza MD;  Location: [facility];  Service: General;  Laterality: N/A;        Medications    Prior to Admission medications    Medication Sig Start Date End Date Taking? Authorizing Provider   hydroxychloroquine (PLAQUENIL) 200 mg tablet Take 1 tablet (200 mg total) by mouth 2 (two) times daily. 8/25/23   Pam Strong PA-C   metFORMIN (GLUCOPHAGE-XR) 500 MG ER 24hr tablet Take 1 tablet (500 mg total) by mouth once daily for 14 days, THEN 1 tablet (500 mg total) 2 (two) times daily with meals for 14 days. 10/4/23 11/1/23  Ana Rosa Thao MD   nitrofurantoin, macrocrystal-monohydrate, (MACROBID) 100 MG capsule Take 1 capsule (100 mg total) by mouth 2 (two) times daily.  Patient not taking: Reported on 10/3/2023 9/19/23   Love Reynolds MD   VIENVA 0.1-20 mg-mcg per tablet Take 1 tablet by mouth once daily. Patient to take continously. Ok to refill sooner than every three months. 5/18/23 5/17/24  Maliha Guerra MD        Social History    Marital status:       Social History     Tobacco Use    Smoking status: Never    Smokeless tobacco: Never   Substance Use Topics    Alcohol use: Yes     Alcohol/week: 2.0 standard drinks of alcohol     Types: 1 Glasses of wine, 1 Shots of liquor per week     Comment: occas       Labs   Reviewed and noted    Food Allergies or Intolerances:  NKFAI      Beverages  Water: ~40 ounces  Alcohol: rare  Coffee: 1 cup with fat-free coffee mate powder + sweet-n-low  Energy Drinks: none  Tea: zero sugar tea  Soda: diet or zero sugar  Milk: none  Juice: none  Other: zero sugar lemonade    24-hour Recall    Breakfast: 830 am // skips // packet grits + butter // 2 eggs + cottage cheese + spinach // honey wheat toast + avocado // assorted Greek yogurt  Lunch: 6691-1786 pm // sandwich (honey wheat, turkey, wiggins, avocado, green + chips) // wrap (rotisserie chicken, light caesar, lettuce, red onion) // bagel thin with light cream cheese + smoked salmon + arugula // air guerra chicken sausage + velveeta mac n' cheese cup + broccoli // pretzels crisps + mozzarella cheese  Dinner: dine out most nights // cooks ~ 1 night per week  Snacks: smartfood pop corn // chips // skinny pop white cheddar // babybel white cheddar cheese wheels // sometimes: ice cream, outshine popsicles, cookies 2-3 nights per week     LIFESTYLE FACTORS    Dining out: 6+ x per week, mostly restaurants, mostly delivery  Types of restaurants/foods: [employer] // asian // [employer]    Frequency of consumption of fried foods: 3+ times per week    Meal preparation/shopping: self and  // [employer] or monal[employer]choa    Sleep: fair  Hours: 6-7 hours of sleep, has recently started waking ~ 2-3 am and not falling back asleep  Wake: 630-7 am  Sleep: in bed by 10 pm, reads until midnight    Stress Level: high  Stress management: therapy    Support System:  spouse    Physical Activity: Sedentary (little or no exercise)  Types of activity: goal is to walk for at least 30 minutes  Frequency  of activity: currently 1-2x per week goal of 5 days    Diagnosis    Inadequate fluid intake related to low intake of water or other fluids as evidenced by 24-hour food recall and patient report.    Intervention    Estimated Energy Requirements:   Calories: 1800  Protein: 135 grams   Carbohydrates: 180 grams   Fat: 60 grams   Baseline for fluids: 100 ounces + sweat loss    Written Materials Provided  ProPlan Eating Plate  RD contact information    Goals:  1.  Drink 2 Alfonso's of water per day  2.  Get at least 20 grams of protein at breakfast  3.  Do not skip any meals/snacks      Monitoring/Evaluation    Monitor the following:  Weight  Sleep  Stress Management  Movement    Communicated with healthcare provider and documented plan for referral to appropriate agency/healthcare provider as needed    Supervising Physician: Ana Rosa Thao MD    Patient motivation, anticipated barriers, expected compliance: Patient is motivated and has verbalized understanding and intent to comply.     Comprehension: fair     Follow-up: 3 weeks

## 2023-11-03 NOTE — PATIENT INSTRUCTIONS
Name: Charlotte Patricia  Date: 11/3/23  NUTRITION RECOMMENDATIONS    Action Items:  1.  Drink 2 Alfonso's of water per day  2.  Get at least 20 grams of protein at breakfast  3.  Do not skip any meals/snacks  Additional Recommendations  Limit added sugars to < 25g per day.  Hydration: At least ½ your body weight in ounces per day  Download the Eat Fit donna for healthy options from local restaurants or look for the Eat Fit symbol on local restaurants' menus for a better-for-you option  Social media I recommend following:  Emeterio Acosta  Dietitian Dorie Coughlin Nutritionist    Macronutrient Servings Totals  Refer to the Meal Planning Guide for serving sizes  Calories Protein Carb Fat   1800 Grams Grams Grams    [phone]     Breakfast Options  Option 1:  445 calories, 26g protein, 41g carbohydrate, 21g fat  Bagel Thin or Jason Light [place] English Muffin  2 ounces smoked salmon  2 tablespoons whipped cream cheese  3 turkey or chicken sausage links  ½ cup raspberries + ¼ cup blueberries  Option 2:  315 calories, 22g protein, 16g carbohydrate, 20g fat  2 eggs   2 slices center cut grey OR 3 slices [place] grey OR 3 chicken/turkey sausage links  1 slice Yanna Minesh Delightful Honey Wheat bread  1 tablespoon Smuckers Sugar Free Jam  Option 3:   Breakfast Avocado Toast  405 calories, 28g protein, 30g carbohdyrate, 21g fat  2 slices Yanna Minesh Delightful Honey Wheat bread  ½ small avocado OR portion cup (~100 calories) guacamole  3 turkey sausage/chicken sausage links  ½ cup egg whites/beaters scrambled  Breakfast Ricotta Toast  312 calories, 20g protein, 43g carbohydrate, 11g fat  2 slices Yanna Minesh Delightful Honey Wheat Bread  ½ cup Part-skim ricotta cheese  2 tablespoons Smucker's Sugar Free Jam  ½ cup raspberries  Breakfast Cottage Cheese Toast  400 calories, 28g protein, 54g carbohydrate, 11g fat  2 slices Yanna Minesh Delightful Honey Wheat Bread  1 container or ½ cup Good Culture low-fat cottage cheese  1 cup sliced  strawberries  2 teaspoons honey (1 teaspoon on each slice bread)  2 tablespoons harley seeds (1 tablespoon on each slice)    Option 4:  425 calories, 33g protein, 41g carbohydrate, 21g fat  2 Veggies Made Great cinnamon roll muffins  2 Hard boiled eggs  3 slices [place] citizen Grey  5 large strawberries    Option 5:  345 calories, 42g protein, 24g carbohydrate, 9g fat  Basic Protein Smoothie  1 scoop protein powder  1 cup frozen fruit  ½ cup frozen cauliflower rice  8 ounces Fairlife milk  1 tablespoon harley seeds  Optional add-ins: peanut butter or PB2, ground flax seed, coconut flakes, sugar-free pudding mix, handful baby spinach, ½ cup pickled beets  Option 6:  386 calories, 23g protein, 35g carbohydrate, 16g fat  Breakfast Garland  Whole wheat English muffin  1 egg  2 slices center cut grey  1 slice [place] Ultra-Thin Sliced Samir Gary  1 cup cantaloupe  Jason José Alonsoight Breakfast  371 calories, 19g protein, 58g carbohydrate, 8g fat  Jason José [place] English Muffin Garland  1 medium pear    Option 7:  Protein Hot Oatmeal Breakfast  394 calories, 30g protein, 46g carbohydrate, 10g fat  2 light babybel cheese  1 mandarin  1 tablespoon 50% less sugar craisins  ½ cup oatmeal cooked with water or unsweetened milk of choice  ½ scoop protein powder  Overnight Oats  360 calories, 25g protein, 48g carbohydrate, 9g fat  ½ cup steel cut or old-fashioned oats  ½ cup Oikos Greek yogurt OR 1 scoop protein powder  ½ cup 2% Fairlife milk  1 tablespoon harley seeds  Pinch salt  Top with ½ cup frozen or fresh berries  Optional add-ins: PB2 powder, 1 tablespoon ground flax, 1 tablespoon unsweetened coconut flakes, 1-2 tablespoons chopped nuts, 1 tablespoon mini dark chocolate chip, 1 tablespoon sugar-free pudding mix    Lunch Options  Option 1:  varies  Any salad kit + packet or small can of tuna/salmon or 4-5 ounces of rotisserie chicken (skin pulled off)  Option 2:  varies  Building A Better-For-You Salad   Start with a base of  fresh greens: arugula, spinach, butter lettuce, mixed greens, kale, lauryn  Add at least 2-3 veggies  Raw veggies add a nice crunch  Grilled or roasted veggies add a nice charred, caramelized flavor  Pickled veggies are great for adding a hint of sweet/sour flavor, while fermented veggies give you added probiotic benefits  Add a lean protein (4-6 ounces)  Grilled chicken, turkey, tuna/salmon packet, shrimp, tofu  Add high fiber carbs  Beans & quinoa   Grains: whole grain, chickpea pasta, farro, bulgar  Starchy veggies: potatoes, winter squash, corn  Fruit: berries, grapes, chopped apples, etc.  Add texture + flavor: choose 1-2, up to ~ 2 tablespoons of each item  Avocado  Cheese  Hummus   Nuts   seeds  Dress it up  Choose light/low-fat options: Feng's Own Light dressings and [place] Far[place] are great options  Make your own: oil + vinegar + seasonings + a little bit of Kristopher mustard  Homemade Salad Dressing Recipes    Option 3:  388 calories, 37g protein, 24g carbohydrate, 16g fat  Build a better sandwich:   2 slices Yanna Minesh Delightful Wheat Bread (or other whole grain, thin sliced bread) OR Carb Balance Burrito Sized Wrap  3-4 ounces deli meat  1 sliced reduced-fat cheese or ¼ sliced avocado  unlimited non-starchy vegetables such as lettuce, tomato, onion, pickle, etc.  1 tablespoons sauce (wiggins, Ranch, honey mustard, BBQ sauce, hummus), unlimited yellow or brown mustard  1 cup of raw non-starchy vegetables (baby carrots, sugar snap peas, cucumbers, etc.) or ½ cup cooked non-starchy vegetables  Option 4:  422 calories, 38g protein, 42g carbohydrate, 14g fat  Pasta Salad  1 heaping cup cooked Banza Pasta  2 ounces rotisserie chicken (skin removed)  3 tablespoons Olive Garden Light Italian dressing  ¼ cup part-skim mozzarella cheese  ½ cup cucumber, chopped  1/3 cup chopped tomato    Option 5:  Snack Box Lunch  Option 1:  382 calories, 34g protein, 30g carbohydrate, 14g fat  2 boiled eggs  2 ounces deli meat  ½  bell pepper  11 mini pretzels  ½ cup grapes  Light string cheese  Option 2:   386 calories, 27g protein, 47g carbohydrate, 10g fat  1 serving Nut Thins  Light string cheese  Tuna pack, any flavor  1 cup chopped strawberries  Fiber one brownie bar  ½ cup blueberries  Option 3:  403 calories, 32g protein, 44g carbohydrate, 11g fat  1 ounce pretzel crisps  ½ cup apple sliced  2 slices ultra thin provolone cheese  2 ounces rotisserie chicken  4 tbsp powdered peanut butter mixed with water (see directions)  ½ cup blackberries  Option 4:   446 calories, 36g protein, 26g carbohydrate, 22g fat  2 tbsp hummus  3 ounces baby carrots  2 hard boiled eggs  ½ cup grapes  2 ounces deli turkey wrapped around 2 low fat string cheese  Option 6:  530 calories, 40g protein, 41g carbohydrate, 26g fat  Turkey Burger  Whole Wheat Austin Thin  4 ounce Turkey Burger  1 slice ultra-thin sliced cheese  2 slices center cut grey  1 tablespoon olive oil mayonnaise  1-2 cups non-starchy vegetables    Dinner Options  Option 1:  Varies: ~ 400 calories, 31-49g protein, 35g carbohydrate, 10g fat  3-5 ounces of baked or broiled fish, seafood, or lean meat cooked with spray olive oil  1 cup or more of cooked non-starchy vegetables  1 serving of 100% Whole Wheat or 1-2 servings of carbohydrates from the Meal Planning Guide Book  Option 2:  Varies: made with 1 cup quinoa, 4 ounce sirloin steak, 1 cup chopped broccoli, and 1 tablespoon Lemon Basil Vinaigrette 500 calories, 46g protein, 42g carbohydrate, 16g fat  Build a grain/power bowl:   Choose a starch (1-2 servings feel free to mix/match)  Brown rice, farro, quinoa, millet, barley, couscous  White or sweet potatoes  [place] squash  Choose a protein (3-4 ounces)  [place], steak (lean cut), chicken, shrimp, edamame, tofu  Vegetables (unlimited amounts)  Try them roasted, steamed, or grilled for variety  Eggplant, broccoli, carrots, bell peppers, green beans, radishes, cucumbers, parsnips, lettuce,  cabbage  Dressing/sauce  Vinaigrette, yogurt, warm broth, hot sauce, soy sauce, chimichurri, pesto, or any combinations!  Garnish (optional)  Fresh herbs, micro greens, nuts, seeds, toasted coconut  Note that nuts/seeds/coconut contain a lot of calories and should be used sparingly  Option 3:  433 calories, 42g protein, 10g carbohydrate, 25g fat  [place]  1-2 carb smart tortillas  ¼ cup cheese of choice  2 tbsp sour cream  3-4 ounces lean (93/7) ground beef or turkey or rotisserie chicken  Option 4:  412 calories, 35g protein, 41g carbohydrate, 12g fat  Better-for-you pizza  2-Ingredient High Protein Dough, (or use a whole wheat theresa) + ¼ cup red sauce + 1/3 cup cheese + 2-4 ounces of protein (rotisserie chicken, turkey pepperoni, sliced grilled chicken/steak) + veggies of choice  Serve with at least 1 cup of non-starchy vegetables or side salad  Option 5:  Varies, made with chicken sausage and marinara and steamed broccoli  433 calories, 35g protein, 44g carbohydrate, 13g fat  Better-for-you pasta  1 cup cooked Banza/Whole Wheat pasta + 2-4 ounces lean protein + ¼ cup huseyin sauce or ½ cup red sauce or 1-2 tablespoons pesto   Serve with 1-2 cups non-starchy vegetables    Snack Ideas  Create a hunger crushing combo with protein, fiber, and fat! Choose two of the three to create a satisfying and filling snack any time of the day!  Oikos Pro 20g Greek Yogurt + piece of fruit  Skinny Pop or Smartfood Popcorn + light cheese stick  2 light cheese sticks + fruit  2 babybel + 1 cup strawberries  Carb balance tortilla + 3 ounces deli meat + 1 tablespoon olive oil mayonnaise  ½ cup Greek yogurt or cottage cheese + ¼ cup freeze dried fruit  1-2 chomp sticks + cheese  Chomp stick + babybel  2-3 slices deli meat wrapped around light cheese sticks + 1 pickle spear  Sweet Treats  Protein shake  ½ cup berries + a dollop of whipped cream  Outshine Frozen Fruit Bars (2-3 if wanted)  Chocolate Dipped Banana Bites  Raspberry Lemon  Greek Frozen Yogurt Bark  1 cup strawberries with 2-3 tbsp chocolate hummus  Any smoothie under 200 calories  Sugar Free Pudding or Jello  5.3 ounce 0% Fage yogurt mixed with 2 tablespoons sugar-free pudding mix  All the Best Banana Nice Cream Recipes   Any sweet treat under 200 calories (think mini/fun-sized)    Favorite Websites for Recipe Inspiration  Skinnytaste  Real Food Dietitians   Eating Well  Budget Bytes  Eating Bird Food  Fit Foodie Finds  Oh Snap Macros   35 [place] Friendly Meal Prep Recipes   9 Meal Prep Success Tips       Additional Recipe Ideas:  Breakfast  Oats Overnight + 8 ounces Fairlife milk   Breakfast Toast Ideas + Protein Shake  Meal Prep Breakfast Options:  Crowder Chicken Egg Cups  Ham & Broccoli Breakfast Casserole  Bagel Ham and Cheese Quiche  Blueberry Protein Pancakes  English Muffin Breakfast Bake     Raspberry Fiber Smoothie (makes 1 smoothie)  1 cup frozen raspberries  1 cup unsweetened almond milk  2/3 cup nonfat vanilla Greek yogurt (or 5.3 oz cup) or 1 scoop protein powder  ½ cup frozen cauliflower (florets or riced)  1 tbsp almond butter  1 tbsp old fashioned oats  1 tbsp harley seeds  ½ tbsp honey  PB&J Smoothie Bowl  ¼ cup milk of your choice  2/3 cup frozen blueberries  2/3 cup sliced strawberries, frozen  1 scoop vanilla protein powder  1 tbsp peanut butter  Optional toppings: 1 tbsp melted peanut butter, 1-2 tbsp granola, harley seeds, blue berries  Spinach Avocado Smoothie  1 cup nonfait plain Greek yogurt  1 cup fresh spinach  1 frozen banana  ¼ avocado  2 tbsp water  1 tsp honey  Lunch/Dinner  Chicken Enchiladas - great for freezing serving with a side salad or at least 1 cup non-starchy vegetables  Ground Turkey Skillet with Zucchni, Corn, Black Beans, and Tomato great by itself or serve over 1 cup cauliflower rice or 1/3 cup brown rice    Air Fryer Chicken Thighs with Air Fryer Frozen Broccoli & 1 cup cubed roasted potatoes  BBQ Grilled chicken breast with Brown Sugar Baked  Beans + 1 cup non-starchy vegetables   Juicy Chicken Breast 6 Ways with ~1 cup potatoes/pasta/rice + at least 1 cup non-starchy vegetables  30 Sheet Pan Dinners    Chicken Fajita Kabobs serve with brown rice, quinoa, or carb balance tortillas + additional non-starchy vegetables  Pasta Primavera add in additional protein such as shrimp, sliced steak, ground beef, or rotisserie chicken    Sheet Pan Turkey Meatloaf + Broccoli serve with 1 whole baked potato and ¼ cup reduced fat cheese  Ranch Chicken Meal Prep  Calorie Swaps  Sweet Baby Rays BBQ sauce --> Sweet Baby Rays Zero Sugar BBQ Sauce  Greek Gods Honey Vanilla Greek Yogurt --> Oikos Triple Zero Vanilla Greek Yogurt  Ice cream [place] --> Skinny Cow No Sugar Added Ice Cream Bar  Butter --> [place] Butter with Light Butter  Rice --> 50/50 mixture of rice & cauliflower rice  Cream Cheese --> Whipped Cream Cheese  Sliced cheese --> ultra thin sliced cheese  Lemus/Sour Cream --> Fage 0% Greek Yogurt  Syrup --> Lite version  Strawberry jam --> sugar free preserves  Tortillas --> corn tortillas or carb smart tortillas  Ranch --> [place] Ranch  Vinaigrettes --> Izabella's Dressing  Coffee syrups --> Sugar free [id number]  Pasta --> Spaghetti Squash  Lemus --> avocado oil lemus  Avocado --> portion control smashed avocado cups  Restaurant Tips   Pick 1 out of the 4 Rule: Instead of eating bread/tortilla chips, an appetizer, alcoholic drink and dessert, choose just one to have with your entrée   Focus on lean proteins: Refer to lean meat/meat substitutes page in meal planning guidebook. Select items grilled, baked, broiled, braised, poached or roasted   For your heart health, avoid crispy, crunchy, breaded, paneed or stuffed items and items that are cream based, au gratin or buttered   Order sauces, dressings, and gravies on the side. This way you can add 1-2 tablespoons yourself. This helps with portion control    Request extra non-starchy vegetables  instead of a starchy side dish. If the starchy side is something you love, consider splitting it with someone else at the table   Beverages: Order water with lemon, sparkling water, or unsweetened tea. Avoid sugary soft drinks, juices and mixers   Deep fried foods: Enjoy no more than 2x/month    Favorite Brands  Protein Bars/Shakes/Powders  Bars  RX Bars  Fit Crunch  THINK  Quest  One  Shakes  Core Power Shakes (26g or 42g protein)  Fairlife Nutrition Plan (30g protein)   Ensure Max Protein (30g protein)  Premier Protein Shakes (30g protein)  Boost High Protein (20g protein)   Muscle Milk Genuine Protein Shakes (25g protein)   Quest Protein Shakes (30g protein)   Orgain Protein Shakes (20-26g protein)   Powders  Ghost  Optimum Nutrition   Muscle Milk  Garden of Life  [place]   Naked   Pasta/Rice  Banza Chickpea Pasta  Barilla Whole Wheat  [place] Rice  Success Boil in Bag Brown Rice  Bread  Orlin's Killer Bread thin sliced  Nature's Own 100% Whole Grain Sugar-Free  O[place]heat Whole Wheat Small Slice  Yanna Minesh Delightful White Whole Wheat  [place] Carb Balance Tortillas  Ole Extreme Wellness Tortillas  Flatout Wraps  [place] Carb Counter Tortillas  Toufayan Keto Wraps  Dairy  Chobani less sugar  Dannon Light and Fit Yogurt  Fage 2% plain  Oikos Triple Zero  Fairlife 2% Milk  Ripple Milk, unsweetened(milk alternative)  Soy Milk, Vanilla Unsweetened  Good Culture Low-Fat Cottage Cheese  Dressings/Sauces  [facility] Greek Yogurt Ranch   Sweet Baby Ray's BBQ Sauce - no added sugar  Any ketchup with no added/less added sugar  Isabel or Jose Alberto or Primal Kitchen  G Wu Sugar Free Sauces   Feng's Own Dressings   Izabella's Dressings   Cereal  Kashi GO!  Ratio  Shredded Wheat  Fiber One  Grape Nuts  Special K Protein  Snacks  Nature's Garden Trail Mix Snack Packs  Pop Chips (like healthier Lays)  Pop Corners (like healthier Doritos)  Skinny Pop Popcorn  Lesser Evil Popcorn  Beanitos Chips  Synagogue Oats Rice Crisps   Late July  Mannie & Quinoa Tortilla Chips  Great Value Zero Sugar Beef Jerky (be mindful of sodium)  Chomps Meat Sticks        [place] Eat Fit    Designed to take the guesswork out of dining out healthfully, [facility] Eat Fit makes the healthy choice the easy choice   Visit [email or web address]    Download the [facility] Eat Fit donna for free on your smartphone   Lists of all Eat Fit restaurant partners & dishes by location   Nutrition facts included for all Eat Fit dishes   200+ Eat Fit approved recipes   Eat Fit shopping guide + community wellness resources   Eat Fit Cookbook   Craft: The Eat Fit Guide to Zero Proof Cocktails     K.I.S.S. (Keep It Simple Silly)  Pick 2-3 non-starchy vegetables  Rotate during the week having them for lunches and dinners.  Pick 1-2 different types of meats  Use different sauces   BBQ  [place]  Teriyaki  Pesto  Salsa  Seasonings  Slap Ya Mama  Lemon Pepper  Everything but the BagRentalutions's Chipotle Cherry  Pick 2 different starches to have during the week  For example:  Potatoes  sweet potatoes  Corn  butternut squash  Banza chickpea pasta  brown rice  Keep breakfast simple, have two options and stick with those two options the whole week  For example, making Overnight Oats with a serving of fruit for 3 breakfasts and eat 2 Healthy Egg Muffin Cups with a slice of whole grain toast and 1 serving fruit for the other 4 breakfasts    For lunch/dinner you could do something simple like the following: This would allow you to not waste a lot of ingredients but have a wide variety of flavors that you could mix match all week.  Chicken thighs 3 ways:   BBQ Sauce  Lemon Pepper Seasoning  Ranch Seasoning  Potatoes 2 ways:   cubed and roasted  mashed with low-fat milk, Greek yogurt (instead of butter or sour cream) and low-fat cheese  Brown rice - made in chicken stock, low sodium (gives it extra flavor)  Frozen Broccoli with ranch seasoning, microwaved  Frozen broccoli roasted, sprinkled with lemon juice and 1  tbsp parmesan  Green beans with sliced mushrooms  Green beans sauteed with garlic  15-Minute Meals  Two slices whole wheat bread + frozen turkey burger + microwave broccoli  Chickpea pasta + jar tomato sauce + chicken sausage + side salad kit  Microwave rice + rotisserie chicken + frozen veggie mix + teriyaki sauce + chopped peanuts  Louie grilled chicken rotisserie or blackened grilled chicken tenders + frozen garlic bread + baby carrots + hummus  Stir Vera Chicken - make with Banza pasta + add in double vegetables   Shrimp + BBQ sauce + canned corn + canned beans + microwave vegetables  Can tuna/salmon/chicken + 2 tablespoons Greek yogurt/avocado oil wiggins + 1 serving Nut Thins + apple + cheese stick

## 2023-11-04 LAB
C3 SERPL-MCNC: 191 MG/DL (ref 50–180)
C4 SERPL-MCNC: 45 MG/DL (ref 11–44)

## 2023-11-07 LAB
NUDT15 GENOTYPE: NORMAL
NUDT15 PHENOTYPE: NORMAL
TPMT ADDITIONAL INFORMATION: NORMAL
TPMT DISCLAIMER: NORMAL
TPMT GENOTYPE RESULT: NORMAL
TPMT INTERPRETATION: NORMAL
TPMT METHOD: NORMAL
TPMT PHENOTYPE: NORMAL
TPMT REVIEWED BY: NORMAL

## 2023-11-08 LAB
DNA TITER: NORMAL
DSDNA AB SER-ACNC: POSITIVE [IU]/ML

## 2023-11-10 ENCOUNTER — LAB VISIT (OUTPATIENT)
Dept: LAB | Facility: HOSPITAL | Age: 28
End: 2023-11-10
Attending: PHYSICIAN ASSISTANT
Payer: COMMERCIAL

## 2023-11-10 ENCOUNTER — OFFICE VISIT (OUTPATIENT)
Dept: RHEUMATOLOGY | Facility: CLINIC | Age: 28
End: 2023-11-10
Payer: COMMERCIAL

## 2023-11-10 ENCOUNTER — PATIENT MESSAGE (OUTPATIENT)
Dept: RHEUMATOLOGY | Facility: CLINIC | Age: 28
End: 2023-11-10

## 2023-11-10 VITALS
WEIGHT: 219.81 LBS | SYSTOLIC BLOOD PRESSURE: 123 MMHG | HEIGHT: 66 IN | HEART RATE: 87 BPM | BODY MASS INDEX: 35.32 KG/M2 | DIASTOLIC BLOOD PRESSURE: 85 MMHG

## 2023-11-10 DIAGNOSIS — Z51.81 MEDICATION MONITORING ENCOUNTER: ICD-10-CM

## 2023-11-10 DIAGNOSIS — D84.821 IMMUNOCOMPROMISED STATE DUE TO DRUG THERAPY: ICD-10-CM

## 2023-11-10 DIAGNOSIS — M32.9 LUPUS ARTHRITIS: Primary | ICD-10-CM

## 2023-11-10 DIAGNOSIS — Z79.899 IMMUNOCOMPROMISED STATE DUE TO DRUG THERAPY: ICD-10-CM

## 2023-11-10 DIAGNOSIS — M35.00 SICCA SYNDROME: ICD-10-CM

## 2023-11-10 DIAGNOSIS — M32.9 LUPUS ARTHRITIS: ICD-10-CM

## 2023-11-10 DIAGNOSIS — Z79.899 LONG-TERM USE OF PLAQUENIL: ICD-10-CM

## 2023-11-10 DIAGNOSIS — Z79.899 HIGH RISK MEDICATION USE: ICD-10-CM

## 2023-11-10 PROCEDURE — G0008 ADMIN INFLUENZA VIRUS VAC: HCPCS | Mod: S$GLB,,, | Performed by: PHYSICIAN ASSISTANT

## 2023-11-10 PROCEDURE — 3008F BODY MASS INDEX DOCD: CPT | Mod: CPTII,S$GLB,, | Performed by: PHYSICIAN ASSISTANT

## 2023-11-10 PROCEDURE — 86704 HEP B CORE ANTIBODY TOTAL: CPT | Performed by: PHYSICIAN ASSISTANT

## 2023-11-10 PROCEDURE — 3008F PR BODY MASS INDEX (BMI) DOCUMENTED: ICD-10-PCS | Mod: CPTII,S$GLB,, | Performed by: PHYSICIAN ASSISTANT

## 2023-11-10 PROCEDURE — 3079F PR MOST RECENT DIASTOLIC BLOOD PRESSURE 80-89 MM HG: ICD-10-PCS | Mod: CPTII,S$GLB,, | Performed by: PHYSICIAN ASSISTANT

## 2023-11-10 PROCEDURE — G0008 FLU VACCINE - QUADRIVALENT - ADJUVANTED: ICD-10-PCS | Mod: S$GLB,,, | Performed by: PHYSICIAN ASSISTANT

## 2023-11-10 PROCEDURE — 3079F DIAST BP 80-89 MM HG: CPT | Mod: CPTII,S$GLB,, | Performed by: PHYSICIAN ASSISTANT

## 2023-11-10 PROCEDURE — 3074F PR MOST RECENT SYSTOLIC BLOOD PRESSURE < 130 MM HG: ICD-10-PCS | Mod: CPTII,S$GLB,, | Performed by: PHYSICIAN ASSISTANT

## 2023-11-10 PROCEDURE — 99999 PR PBB SHADOW E&M-EST. PATIENT-LVL III: ICD-10-PCS | Mod: PBBFAC,,, | Performed by: PHYSICIAN ASSISTANT

## 2023-11-10 PROCEDURE — 90694 FLU VACCINE - QUADRIVALENT - ADJUVANTED: ICD-10-PCS | Mod: S$GLB,,, | Performed by: PHYSICIAN ASSISTANT

## 2023-11-10 PROCEDURE — 1159F MED LIST DOCD IN RCRD: CPT | Mod: CPTII,S$GLB,, | Performed by: PHYSICIAN ASSISTANT

## 2023-11-10 PROCEDURE — 99215 OFFICE O/P EST HI 40 MIN: CPT | Mod: 25,S$GLB,, | Performed by: PHYSICIAN ASSISTANT

## 2023-11-10 PROCEDURE — 99215 PR OFFICE/OUTPT VISIT, EST, LEVL V, 40-54 MIN: ICD-10-PCS | Mod: 25,S$GLB,, | Performed by: PHYSICIAN ASSISTANT

## 2023-11-10 PROCEDURE — 86235 NUCLEAR ANTIGEN ANTIBODY: CPT | Performed by: PHYSICIAN ASSISTANT

## 2023-11-10 PROCEDURE — 1159F PR MEDICATION LIST DOCUMENTED IN MEDICAL RECORD: ICD-10-PCS | Mod: CPTII,S$GLB,, | Performed by: PHYSICIAN ASSISTANT

## 2023-11-10 PROCEDURE — 3044F PR MOST RECENT HEMOGLOBIN A1C LEVEL <7.0%: ICD-10-PCS | Mod: CPTII,S$GLB,, | Performed by: PHYSICIAN ASSISTANT

## 2023-11-10 PROCEDURE — 86803 HEPATITIS C AB TEST: CPT | Performed by: PHYSICIAN ASSISTANT

## 2023-11-10 PROCEDURE — 87340 HEPATITIS B SURFACE AG IA: CPT | Performed by: PHYSICIAN ASSISTANT

## 2023-11-10 PROCEDURE — 99999 PR PBB SHADOW E&M-EST. PATIENT-LVL III: CPT | Mod: PBBFAC,,, | Performed by: PHYSICIAN ASSISTANT

## 2023-11-10 PROCEDURE — 86480 TB TEST CELL IMMUN MEASURE: CPT | Performed by: PHYSICIAN ASSISTANT

## 2023-11-10 PROCEDURE — 86706 HEP B SURFACE ANTIBODY: CPT | Performed by: PHYSICIAN ASSISTANT

## 2023-11-10 PROCEDURE — 83520 IMMUNOASSAY QUANT NOS NONAB: CPT | Performed by: PHYSICIAN ASSISTANT

## 2023-11-10 PROCEDURE — 36415 COLL VENOUS BLD VENIPUNCTURE: CPT | Performed by: PHYSICIAN ASSISTANT

## 2023-11-10 PROCEDURE — 3074F SYST BP LT 130 MM HG: CPT | Mod: CPTII,S$GLB,, | Performed by: PHYSICIAN ASSISTANT

## 2023-11-10 PROCEDURE — 90694 VACC AIIV4 NO PRSRV 0.5ML IM: CPT | Mod: S$GLB,,, | Performed by: PHYSICIAN ASSISTANT

## 2023-11-10 PROCEDURE — 3044F HG A1C LEVEL LT 7.0%: CPT | Mod: CPTII,S$GLB,, | Performed by: PHYSICIAN ASSISTANT

## 2023-11-10 RX ORDER — PREDNISONE 5 MG/1
5 TABLET ORAL DAILY
Qty: 90 TABLET | Refills: 1 | Status: SHIPPED | OUTPATIENT
Start: 2023-11-10 | End: 2024-03-11

## 2023-11-10 RX ORDER — BELIMUMAB 200 MG/ML
200 SOLUTION SUBCUTANEOUS
Qty: 4 ML | Refills: 4 | Status: ACTIVE | OUTPATIENT
Start: 2023-11-10

## 2023-11-10 NOTE — PROGRESS NOTES
Subjective:      Patient ID: Charlotte Patricia is a 28 y.o. female.    Chief Complaint: Lupus and Positive BUSTER    HPI   Charlotte Patricia  is a 28 y.o. female seen today for f/u +BUSTER w +dsDNA and joint pain/stiffness.  Initially seen in hepatology for for elevated liver enzymes.  They were able to rule out autoimmune hepatitis.  I had her on a 1 month 20 mg prednisone taper.  Joints felt very good at that time.  Also initiated Plaquenil around that time as well.  She noted significant symptomatic improvement with her joints and morning stiffness while on the steroid.  Last visit she reported starting to see some improvements with Plaquenil as well.  She is tolerating it well.  Currently off all steroids.  Arthritis symptoms have been worsening since completing last PDN rx.    Symptoms worsened again back in September so we put her on another prednisone taper at a higher dose.  Joint symptoms essentially resolved.  Joints are still feeling better today, but she is still having worsening morning stiffness, worsening inflammatory joint pain.  Worst joints include the feet, and the hands.  She also feels stiffness and pain in the knees as well.  She also endorses xerostomia.  No problems with dry eyes.  She is now reporting photosensitivity as well.      Occasional SOB has resolved.  She endorses morning stiffness lasting approximately 30-45 minutes.  Denies chest pain.  She denies fevers, chills, photosensitivity, eye pain, hematuria, blood in the stool, rash, sicca symptoms, raynauds, finger/oral/nasal ulcerations, LAD, weakness, hair loss.  Rheumatologic systems otherwise negative.    Serologies/Labs:  +BUSTER >=1:2560 (homogenous) and 1:640 speckled  +SSB, +dsDNA (>=1:5120)  Neg anti mitochondrial, anti smooth muscle  Current Treatment:  OTC NSAIDs prn  Previous Treatment:   na      Current Outpatient Medications:     hydroxychloroquine (PLAQUENIL) 200 mg tablet, Take 1 tablet (200 mg total) by mouth 2 (two)  times daily., Disp: 60 tablet, Rfl: 5    metFORMIN (GLUCOPHAGE-XR) 500 MG ER 24hr tablet, Take 1 tablet (500 mg total) by mouth once daily for 14 days, THEN 1 tablet (500 mg total) 2 (two) times daily with meals for 14 days., Disp: 60 tablet, Rfl: 2    VIENVA 0.1-20 mg-mcg per tablet, Take 1 tablet by mouth once daily. Patient to take continously. Ok to refill sooner than every three months., Disp: 90 tablet, Rfl: 3    nitrofurantoin, macrocrystal-monohydrate, (MACROBID) 100 MG capsule, Take 1 capsule (100 mg total) by mouth 2 (two) times daily. (Patient not taking: Reported on 10/3/2023), Disp: 20 capsule, Rfl: 0  No current facility-administered medications for this visit.    Facility-Administered Medications Ordered in Other Visits:     lactated ringers infusion, , Intravenous, Continuous, Marj Dejesus MD, New Bag at 02/02/22 0700    Past Medical History:   Diagnosis Date    Acne      Family History   Problem Relation Age of Onset    Hyperlipidemia Mother     Hypertension Father     Heart disease Maternal Aunt     Heart disease Maternal Uncle     Cancer Paternal Uncle         bone    Heart disease Maternal Grandmother     Hypertension Maternal Grandfather     Cancer Maternal Grandfather     Colon cancer Paternal Grandfather      Social History     Socioeconomic History    Marital status:     Number of children: 0   Occupational History    Occupation: Student- Civil Engineering [employer]   Tobacco Use    Smoking status: Never    Smokeless tobacco: Never   Substance and Sexual Activity    Alcohol use: Yes     Alcohol/week: 2.0 standard drinks of alcohol     Types: 1 Glasses of wine, 1 Shots of liquor per week     Comment: occas    Drug use: No    Sexual activity: Yes     Partners: Male     Birth control/protection: Pill, OCP   Social History Narrative    ** Merged History Encounter **          Review of patient's allergies indicates:   Allergen Reactions    Sulfa (sulfonamide antibiotics) Rash       Objective:  "  /85   Pulse 87   Ht 5' 6" (1.676 m)   Wt 99.7 kg (219 lb 12.8 oz)   LMP 10/27/2023 (Approximate)   BMI 35.48 kg/m²   Immunization History   Administered Date(s) Administered    COVID-19, MRNA, LN-S, PF (Pfizer) (Purple Cap) 04/01/2021, 04/22/2021, 12/30/2021    DTP 1995, 1995, 1995, 10/10/1997    DTP / HiB 1995    DTaP 09/28/1999    HIB 1995, 1995, 1995, 10/10/1997    Hepatitis A, Pediatric/Adolescent, 2 Dose 11/21/2006, 05/31/2007    Hepatitis B, Pediatric/Adolescent 1995, 1995, 1995    Influenza (Flumist) - Quadrivalent - Intranasal *Preferred* (2-49 years old) 11/06/2012    Influenza - Intranasal 11/06/2012    Influenza - Intranasal - Trivalent 11/02/2009, 11/17/2010, 10/19/2011    Influenza - Quadrivalent - MDCK - PF 12/30/2021    Influenza - Quadrivalent - PF *Preferred* (6 months and older) 11/18/2019, 11/14/2020    Influenza Split 12/08/2003, 01/12/2004, 11/09/2005, 11/21/2006, 10/30/2007, 10/30/2008    MMR 04/12/1996, 09/28/1999    Meningococcal Conjugate (MCV4P) 11/21/2006    OPV 1995, 1995, 1995, 09/28/1999    Pneumococcal Conjugate - 20 Valent 09/19/2023    Tdap 11/21/2006, 09/19/2023    Varicella 04/12/1996, 02/04/2009       Physical Exam   Constitutional: She is oriented to person, place, and time. No distress.   HENT:   Head: Normocephalic and atraumatic.   Pulmonary/Chest: Effort normal.   Abdominal: She exhibits no distension.   Musculoskeletal:         General: No swelling or tenderness. Normal range of motion.      Cervical back: Normal range of motion.   Lymphadenopathy:     She has no cervical adenopathy.   Neurological: She is alert and oriented to person, place, and time.   Skin: Skin is warm and dry. No rash noted.   Psychiatric: Mood normal.   Nursing note and vitals reviewed.    Synovitis, but tender to palpation across multiple PIP and MCP joints both hands.  Positive MTP squeeze test    Recent " Results (from the past 672 hour(s))   Protein/Creatinine Ratio, Urine    Collection Time: 11/03/23 12:02 PM   Result Value Ref Range    Protein, Urine Random 9 0 - 15 mg/dL    Creatinine, Urine 182.1 15.0 - 325.0 mg/dL    Prot/Creat Ratio, Urine 0.05 0.00 - 0.20   Urinalysis Microscopic    Collection Time: 11/03/23 12:02 PM   Result Value Ref Range    Amorphous, UA Many (A) None-Moderate    Microscopic Comment SEE COMMENT    Comprehensive Metabolic Panel    Collection Time: 11/03/23 12:10 PM   Result Value Ref Range    Sodium 140 136 - 145 mmol/L    Potassium 3.7 3.5 - 5.1 mmol/L    Chloride 106 95 - 110 mmol/L    CO2 25 23 - 29 mmol/L    Glucose 103 70 - 110 mg/dL    BUN 10 6 - 20 mg/dL    Creatinine 1.0 0.5 - 1.4 mg/dL    Calcium 9.4 8.7 - 10.5 mg/dL    Total Protein 8.0 6.0 - 8.4 g/dL    Albumin 3.8 3.5 - 5.2 g/dL    Total Bilirubin 0.3 0.1 - 1.0 mg/dL    Alkaline Phosphatase 83 55 - 135 U/L    AST 30 10 - 40 U/L    ALT 91 (H) 10 - 44 U/L    eGFR >60 >60 mL/min/1.73 m^2    Anion Gap 9 8 - 16 mmol/L   CBC Auto Differential    Collection Time: 11/03/23 12:10 PM   Result Value Ref Range    WBC 9.25 3.90 - 12.70 K/uL    RBC 4.78 4.00 - 5.40 M/uL    Hemoglobin 14.3 12.0 - 16.0 g/dL    Hematocrit 43.7 37.0 - 48.5 %    MCV 91 82 - 98 fL    MCH 29.9 27.0 - 31.0 pg    MCHC 32.7 32.0 - 36.0 g/dL    RDW 12.4 11.5 - 14.5 %    Platelets 272 150 - 450 K/uL    MPV 10.8 9.2 - 12.9 fL    Immature Granulocytes 0.4 0.0 - 0.5 %    Gran # (ANC) 6.0 1.8 - 7.7 K/uL    Immature Grans (Abs) 0.04 0.00 - 0.04 K/uL    Lymph # 2.6 1.0 - 4.8 K/uL    Mono # 0.5 0.3 - 1.0 K/uL    Eos # 0.1 0.0 - 0.5 K/uL    Baso # 0.08 0.00 - 0.20 K/uL    nRBC 0 0 /100 WBC    Gran % 64.4 38.0 - 73.0 %    Lymph % 28.1 18.0 - 48.0 %    Mono % 5.2 4.0 - 15.0 %    Eosinophil % 1.0 0.0 - 8.0 %    Basophil % 0.9 0.0 - 1.9 %    Differential Method Automated    Sedimentation rate    Collection Time: 11/03/23 12:10 PM   Result Value Ref Range    Sed Rate 32 (H) 0 - 20  "mm/Hr   C-Reactive Protein    Collection Time: 11/03/23 12:10 PM   Result Value Ref Range    CRP 22.2 (H) 0.0 - 8.2 mg/L   Anti-DNA Ab, Double-Stranded    Collection Time: 11/03/23 12:10 PM   Result Value Ref Range    ds DNA Ab Positive (A) Negative 1:10   C4 Complement    Collection Time: 11/03/23 12:10 PM   Result Value Ref Range    Complement (C-4) 45 (H) 11 - 44 mg/dL   C3 Complement    Collection Time: 11/03/23 12:10 PM   Result Value Ref Range    Complement (C-3) 191 (H) 50 - 180 mg/dL   Thiopurine Methyltrans (TPMT) Genotyping    Collection Time: 11/03/23 12:10 PM   Result Value Ref Range    TPMT Genotype Result 1/1     TPMT Phenotype Normal metabolizer     NUDT15 Genotype 1/1     NUDT15 Phenotype Normal metabolizer     TPMT Interpretation SEE BELOW     TPMT Additional Information Test Not Performed     TPMT Method SEE BELOW     TPMT Disclaimer SEE BELOW     TPMT Reviewed by Dale Alegria MD    DNA Titer    Collection Time: 11/03/23 12:10 PM   Result Value Ref Range    DNA Titer 1:2560        No results found for: "TBGOLDPLUS"   Lab Results   Component Value Date    HEPBCAB Non-reactive 06/27/2023    HEPCAB Non-reactive 06/27/2023          Assessment:     No diagnosis found.            Plan:     There are no diagnoses linked to this encounter.        SLE w worsening lupus arthritis  Eular Criteria  +BUSTER w   +dsDNA   Lupus arthritis - Joint pain and prolonged am stiffness  C/o photosensitivity.  Denies mucosal ulcerations, raynauds, dysphagia, rash  Occasional SOB - improved  CXR normal previously  Consider CT if it worsens  Check rna polymerase and scl 70  Labs reviewed as above  dsDNA titer now 2560 (up from 1280 last visit)  No leukopenia, thrombocytopenia  ALT 91, AST within normal limits  Has seen hepatology in the past  FibroScan summer 2023 shows significant steatosis without fibrosis  C3/C4 w some elevation  ESR/CRP both elevated  No proteinuria  C/w hcq 200 mg bid - has had baseline exam  Add " benlysta 200 mg SQ every week  Discussed risks and benefits  Literature provided to the patient  Not planning to get pregnant in the immediate future  currently on birth control.   Considering trying to get pregnant in the next few years.  Advise patient she would need to be off Benlysta for a minimum of 4 months before trying to get pregnant  Check TB and hep serologies today  She will discuss Benlysta with her  over the weekend  Discussed disease course as it pertains to pregnancy  States she would like to consider getting pregnant in the next 1-2 years  Arava not appropriate for consideration of contraception  Patient would need to be off methotrexate x6 months prior to conceiving  Briefly discussed Imuran.  Check TPMT with next labs  High dose flu shot today  Elevated LFTs  Avoid Arava, MTX and Imuran d/t potential for elevation in LFTs  F/u w hepatology  Xerostomia in conjunctino w +SSA - likely overlapping SS  Yearly SS labs 8/2023  No parotitis/parotid swelling  Drug therapy requiring intensive monitoring for toxicity  High Risk Medication Monitoring encounter  No current medication related issues, no evidence of toxicity  I ordered labs for toxicity monitoring, have personally reviewed the findings, and discussed them with the patient.  Pending labs will be sent via the portal  Compromised immune system secondary to autoimmune disease and/or use of immunosuppressive drugs.  Monitor carefully for infections.  Advised patient to get immediate medical care if any infection arises.  Also advised strict adherence age-appropriate vaccinations and cancer screenings with PCP.  Patient advised to hold DMARD and/or biologic therapy for signs of infection or for surgery. If you are unsure what to do please call our office for instruction.[facility] [phone]  Return to clinic: 4 mos w SLE labs prior    50 minutes of total time spent on the encounter, which includes face to face time and  non-face to face time preparing to see the patient (eg, review of tests), Obtaining and/or reviewing separately obtained history, Documenting clinical information in the electronic or other health record, Independently interpreting results (not separately reported) and communicating results to the patient/family/caregiver, or Care coordination (not separately reported).       No follow-ups on file.    The patient understands, chooses and consents to this plan and accepts all the risks which include but are not limited to the risks mentioned above.     Disclaimer: This note was prepared using a voice recognition system and is likely to have sound alike errors within the text.

## 2023-11-10 NOTE — PROGRESS NOTES
Administered 0.5cc high dose flu vaccine to right Deltoid. Pt tolerated well. VIS given to patient. Advised patient to wait in lobby 15 minutes after receiving injection to monitor for any reactions.  Pt verbalized understanding.     Lot: [id number]  Exp: 05/10/2024

## 2023-11-11 LAB
GAMMA INTERFERON BACKGROUND BLD IA-ACNC: 0.05 IU/ML
HBV CORE AB SERPL QL IA: NORMAL
HBV SURFACE AB SER-ACNC: <3 MIU/ML
HBV SURFACE AB SER-ACNC: NORMAL M[IU]/ML
HBV SURFACE AG SERPL QL IA: NORMAL
HCV AB SERPL QL IA: NORMAL
M TB IFN-G CD4+ BCKGRND COR BLD-ACNC: 0.02 IU/ML
M TB IFN-G CD4+ BCKGRND COR BLD-ACNC: 0.02 IU/ML
MITOGEN IGNF BCKGRD COR BLD-ACNC: 9.95 IU/ML
TB GOLD PLUS: NEGATIVE

## 2023-11-14 ENCOUNTER — OFFICE VISIT (OUTPATIENT)
Dept: PRIMARY CARE CLINIC | Facility: CLINIC | Age: 28
End: 2023-11-14
Payer: COMMERCIAL

## 2023-11-14 DIAGNOSIS — R53.83 FATIGUE, UNSPECIFIED TYPE: ICD-10-CM

## 2023-11-14 DIAGNOSIS — R73.03 PREDIABETES: ICD-10-CM

## 2023-11-14 DIAGNOSIS — R11.0 NAUSEA: ICD-10-CM

## 2023-11-14 DIAGNOSIS — K76.0 FATTY LIVER: ICD-10-CM

## 2023-11-14 DIAGNOSIS — E88.819 INSULIN RESISTANCE: ICD-10-CM

## 2023-11-14 DIAGNOSIS — E66.01 CLASS 2 SEVERE OBESITY DUE TO EXCESS CALORIES WITH SERIOUS COMORBIDITY AND BODY MASS INDEX (BMI) OF 35.0 TO 35.9 IN ADULT: ICD-10-CM

## 2023-11-14 LAB — ENA SCL70 AB SER-ACNC: <0.6 U/ML

## 2023-11-14 PROCEDURE — 1159F PR MEDICATION LIST DOCUMENTED IN MEDICAL RECORD: ICD-10-PCS | Mod: CPTII,95,, | Performed by: FAMILY MEDICINE

## 2023-11-14 PROCEDURE — 3044F HG A1C LEVEL LT 7.0%: CPT | Mod: CPTII,95,, | Performed by: FAMILY MEDICINE

## 2023-11-14 PROCEDURE — 1160F PR REVIEW ALL MEDS BY PRESCRIBER/CLIN PHARMACIST DOCUMENTED: ICD-10-PCS | Mod: CPTII,95,, | Performed by: FAMILY MEDICINE

## 2023-11-14 PROCEDURE — 99214 OFFICE O/P EST MOD 30 MIN: CPT | Mod: 95,,, | Performed by: FAMILY MEDICINE

## 2023-11-14 PROCEDURE — 99214 PR OFFICE/OUTPT VISIT, EST, LEVL IV, 30-39 MIN: ICD-10-PCS | Mod: 95,,, | Performed by: FAMILY MEDICINE

## 2023-11-14 PROCEDURE — 3044F PR MOST RECENT HEMOGLOBIN A1C LEVEL <7.0%: ICD-10-PCS | Mod: CPTII,95,, | Performed by: FAMILY MEDICINE

## 2023-11-14 PROCEDURE — 1159F MED LIST DOCD IN RCRD: CPT | Mod: CPTII,95,, | Performed by: FAMILY MEDICINE

## 2023-11-14 PROCEDURE — 1160F RVW MEDS BY RX/DR IN RCRD: CPT | Mod: CPTII,95,, | Performed by: FAMILY MEDICINE

## 2023-11-14 RX ORDER — SEMAGLUTIDE 0.25 MG/.5ML
0.25 INJECTION, SOLUTION SUBCUTANEOUS
Qty: 2 ML | Refills: 1 | Status: SHIPPED | OUTPATIENT
Start: 2023-11-14 | End: 2024-03-07

## 2023-11-14 NOTE — PROGRESS NOTES
Subjective       The patient location is: [place]  The chief complaint leading to consultation is: follow up    Visit type: audiovisual    Face to Face time with patient: 18  22 minutes of total time spent on the encounter, which includes face to face time and non-face to face time preparing to see the patient (eg, review of tests), Obtaining and/or reviewing separately obtained history, Documenting clinical information in the electronic or other health record, Independently interpreting results (not separately reported) and communicating results to the patient/family/caregiver, or Care coordination (not separately reported).         Each patient to whom he or she provides medical services by telemedicine is:  (1) informed of the relationship between the physician and patient and the respective role of any other health care provider with respect to management of the patient; and (2) notified that he or she may decline to receive medical services by telemedicine and may withdraw from such care at any time.    Notes:    Patient ID: Charlotte Patricia is a 28 y.o. female.    Chief Complaint: follow up    Pt seen last month for hx of fatty liver, lupus arthritis hx. Has been on steroids and plaquenil.     Had rheum appt last week; went okay overall. Off steroids. Will plan to start benlysta. Is utd for eye exam for plaquenil.     Recent interval labs cw IR and prediabetes. Placed on metformin. She feels like has been okay overall; initially had gi cramping. Has had improvement. Some loose bm--attributes to multiple things.     Reviewed glp1 at last visit.     She was able to see nutrition. Appt went well. She is working on implementing some changes.     Hx of depression; has a therapist. Pt has mentioned a low dose of medication. They are considering a medication (reviewed with pt wellbutrin).    Pt would like to know if wegovy covered.       HPI       Objective     PAST MEDICAL HISTORY:  Past Medical History:    Diagnosis Date    Acne          PAST SURGICAL HISTORY:  Past Surgical History:   Procedure Laterality Date    NOSE SURGERY      ROBOT-ASSISTED CHOLECYSTECTOMY USING DA NICK XI N/A 1/7/2022    Procedure: XI ROBOTIC CHOLECYSTECTOMY;  Surgeon: Ernesto Plaza MD;  Location: [facility];  Service: General;  Laterality: N/A;       FAMILY HISTORY:  Family History   Problem Relation Age of Onset    Hyperlipidemia Mother     Hypertension Father     Heart disease Maternal Aunt     Heart disease Maternal Uncle     Cancer Paternal Uncle         bone    Heart disease Maternal Grandmother     Hypertension Maternal Grandfather     Cancer Maternal Grandfather     Colon cancer Paternal Grandfather           SOCIAL HISTORY:  Social History     Social History Narrative    ** Merged History Encounter **            MEDICATIONS:  Medications have been reviewed.    ALLERGIES:  Allergies have been reviewed.    There were no vitals filed for this visit.  Wt Readings from Last 10 Encounters:   11/10/23 99.7 kg (219 lb 12.8 oz)   11/03/23 (P) 99.6 kg (219 lb 9.3 oz)   10/03/23 99.1 kg (218 lb 7.6 oz)   09/19/23 99.7 kg (219 lb 14.5 oz)   07/24/23 100.3 kg (221 lb 0.2 oz)   07/21/23 99.5 kg (219 lb 5.7 oz)   07/10/23 98.9 kg (218 lb 0.6 oz)   06/27/23 99.3 kg (218 lb 14.7 oz)   06/18/23 100.7 kg (222 lb)   06/13/23 101.6 kg (223 lb 15.8 oz)       Lab Results   Component Value Date    WBC 9.25 11/03/2023    HGB 14.3 11/03/2023    HCT 43.7 11/03/2023     11/03/2023    CHOL 199 08/23/2023    TRIG 149 08/23/2023    HDL 47 08/23/2023    ALT 91 (H) 11/03/2023    AST 30 11/03/2023     11/03/2023    K 3.7 11/03/2023     11/03/2023    CREATININE 1.0 11/03/2023    BUN 10 11/03/2023    CO2 25 11/03/2023    TSH 2.244 06/13/2023    INR 0.9 06/27/2023    HGBA1C 5.8 (H) 10/03/2023       Review of Systems   Constitutional:  Negative for activity change, appetite change, fatigue and fever.   HENT:  Negative for mouth dryness and goiter.     Eyes:  Negative for visual disturbance.   Respiratory:  Negative for apnea, cough, chest tightness and shortness of breath.    Cardiovascular:  Negative for chest pain, palpitations and leg swelling.   Gastrointestinal:  Negative for abdominal pain, constipation and diarrhea.   Endocrine: Negative for cold intolerance, heat intolerance, polydipsia, polyphagia and polyuria.   Genitourinary:  Negative for frequency and menstrual problem.   Musculoskeletal:  Negative for arthralgias and myalgias.   Integumentary:  Negative for color change and rash.   Psychiatric/Behavioral:  Negative for sleep disturbance. The patient is not nervous/anxious.        Physical Exam  Constitutional:       General: She is not in acute distress.     Appearance: Normal appearance.   HENT:      Head: Normocephalic and atraumatic.   Eyes:      General: No scleral icterus.  Pulmonary:      Effort: Pulmonary effort is normal. No respiratory distress.   Neurological:      Mental Status: She is alert and oriented to person, place, and time.   Psychiatric:         Mood and Affect: Mood normal.         Behavior: Behavior normal.              Assessment and Plan     1. Insulin resistance  2. Prediabetes  On metformin, as below     3. Fatty liver  Overview:  fatty liver on u/s, still present, not improved, some with menses and worse with cycle and alcohol. setup hida. f/u afterwards, if hida neg then gyn       4. Nausea  Overview:  fatty liver on u/s, still present, not improved, some with menses and worse with cycle and alcohol. setup hida. f/u afterwards, if hida neg then gyn       5. Fatigue, unspecified type    6. Class 2 severe obesity due to excess calories with serious comorbidity and body mass index (BMI) of 35.0 to 35.9 in adult  -     semaglutide, weight loss, (WEGOVY) 0.25 mg/0.5 mL PnIj; Inject 0.25 mg into the skin every 7 days.  Dispense: 2 mL; Refill: 1         Reviewed risks/benefits/se's  Pt is aware should not get pregnant on the  wegovy  If wegovy is approved and get it-->stop metformin  If not approved, can consider trying wellbutrin--would help with food; possibly reduce food noise  Follow up in about 6 weeks (around 12/26/2023), or if symptoms worsen or fail to improve.

## 2023-11-21 LAB — RNAP III AB SER-ACNC: <20 UNITS

## 2023-11-27 DIAGNOSIS — K76.0 FATTY LIVER: ICD-10-CM

## 2023-11-27 DIAGNOSIS — E66.01 CLASS 2 SEVERE OBESITY DUE TO EXCESS CALORIES WITH SERIOUS COMORBIDITY AND BODY MASS INDEX (BMI) OF 35.0 TO 35.9 IN ADULT: ICD-10-CM

## 2023-11-27 DIAGNOSIS — R73.03 PREDIABETES: Primary | ICD-10-CM

## 2023-12-01 ENCOUNTER — PATIENT MESSAGE (OUTPATIENT)
Dept: PRIMARY CARE CLINIC | Facility: CLINIC | Age: 28
End: 2023-12-01
Payer: COMMERCIAL

## 2023-12-08 ENCOUNTER — NUTRITION (OUTPATIENT)
Dept: PRIMARY CARE CLINIC | Facility: CLINIC | Age: 28
End: 2023-12-08

## 2023-12-08 DIAGNOSIS — K76.0 FATTY LIVER: ICD-10-CM

## 2023-12-08 DIAGNOSIS — E66.01 CLASS 2 SEVERE OBESITY DUE TO EXCESS CALORIES WITH SERIOUS COMORBIDITY AND BODY MASS INDEX (BMI) OF 35.0 TO 35.9 IN ADULT: ICD-10-CM

## 2023-12-08 DIAGNOSIS — R73.03 PREDIABETES: ICD-10-CM

## 2023-12-08 PROCEDURE — 97803 MED NUTRITION INDIV SUBSEQ: CPT | Mod: CSM,S$GLB,, | Performed by: DIETITIAN, REGISTERED

## 2023-12-08 PROCEDURE — 99999 PR PBB SHADOW E&M-EST. PATIENT-LVL II: CPT | Mod: PBBFAC,,, | Performed by: DIETITIAN, REGISTERED

## 2023-12-08 PROCEDURE — 97803 PR MED NUTR THER, SUBSQ, INDIV, EA 15 MIN: ICD-10-PCS | Mod: CSM,S$GLB,, | Performed by: DIETITIAN, REGISTERED

## 2023-12-08 PROCEDURE — 99999 PR PBB SHADOW E&M-EST. PATIENT-LVL II: ICD-10-PCS | Mod: PBBFAC,,, | Performed by: DIETITIAN, REGISTERED

## 2023-12-08 NOTE — PATIENT INSTRUCTIONS
Social Media to follow:  Dietitian Dorie Coughlin Nutritionist  Emeterio Acosta RD    Products:  Maryjo Bagels     Supplements to consider:  Solgar #7  Ultimate [place] - [place] Naturals  Palmer Super Omega-3 Gems  Palmer Cod Liver Oil - Hear me out it's delicious. Try the recipe below:  3 cups plain popcorn + 1 tablespoon Palmer Cod Liver Oil (lemon) + generous pinch of salt and toss.  1 cup of popcorn = 1 serving of 1,100 mg of omega-3's  Now Vitamin D3 5,000 IU    Sources of Selenium:  Reminder selenium binds with mercury and blocks the absorption of it.  Thomson Nuts - should not be consumed daily  Fish: tuna, oysters, mackerel, and salmon all have a large amount in them  Enriched whole grains  Lean cuts of beef, pork, chicken, & turkey  Eggs  Brown rice    Anti-inflammatory foods:  Fruits & Vegetables  Whole Grains  Beans & Lentils  Nuts & Seeds (watch portion size as these are easy to over consume)  Lean beef, pork, & poultry  Fatty fish: salmon, mackerel, trout, sardines, anchovies, tuna  Low-fat dairy  Olive/avocado oil  Dark chocolate  Seasonings:  Spicy (hot pepper) seasonings such as chili powder / cayenne / black pepper  Garlic  Blanca  Onion  Turmeric   Plant-based omega-3s:  Avocado  Nuts/seeds  Mannie seeds  Ground flax    Cookbooks:  Complete Anti-Inflammatory Diet for Beginners   Healthy Meal Prep Cookbook  Bowls Vibrant Recipes Endless Possibilities   Well Plated Cookbook  5-Ingredient [place] Cookbook - great for beginners, very simple and healthy plant focused recipes  30-Minute Mediterranean Diet Cookbook       Recipes & Meal Ideas:  15 Creative Ways to Use Beans & Lentils  Easy Grain Bowl  Love & Anum Grain Bowl - will just need to add animal protein on top  25 Grain Bowl Ideas  Skinny Taste Paleo Recipes  Real Food Dietitians - great for whole food focused recipes that are high in fiber and protein             K.I.S.S. (Keep It Simple Silly)  Pick 2-3 non-starchy vegetables  Rotate during the week  having them for lunches and dinners.  Pick 1-2 different types of meats  Use different sauces   BBQ  Amador  Teriyaki  Pesto  Salsa  Seasonings  Slap Ya Mama  Lemon Pepper  Everything but the Bagel  [place]'s Chipotle Cherry  Pick 2 different starches to have during the week  For example:  Potatoes  sweet potatoes  Corn  butternut squash  Banza chickpea pasta  brown rice  Keep breakfast simple, have two options and stick with those two options the whole week  For example, making Overnight Oats with a serving of fruit for 3 breakfasts and eat 2 Healthy Egg Muffin Cups with a slice of whole grain toast and 1 serving fruit for the other 4 breakfasts    For lunch/dinner you could do something simple like the following: This would allow you to not waste a lot of ingredients but have a wide variety of flavors that you could mix match all week.  Chicken thighs 3 ways:   BBQ Sauce  Lemon Pepper Seasoning  Ranch Seasoning  Potatoes 2 ways:   cubed and roasted  mashed with low-fat milk, Greek yogurt (instead of butter or sour cream) and low-fat cheese  Brown rice - made in chicken stock, low sodium (gives it extra flavor)  Frozen Broccoli with ranch seasoning, microwaved  Frozen broccoli roasted, sprinkled with lemon juice and 1 tbsp parmesan  Green beans with sliced mushrooms  Green beans sauteed with garlic  15-Minute Meals  Two slices whole wheat bread + frozen turkey burger + microwave broccoli  Chickpea pasta + jar tomato sauce + chicken sausage + side salad kit  Microwave rice + rotisserie chicken + frozen veggie mix + teriyaki sauce + chopped peanuts  Louie grilled chicken rotisserie or blackened grilled chicken tenders + frozen garlic bread + baby carrots + hummus  Stir Vera Chicken - make with Banza pasta + add in double vegetables   Shrimp + BBQ sauce + canned corn + canned beans + microwave vegetables  Can tuna/salmon/chicken + 2 tablespoons Greek yogurt/avocado oil wiggins + 1 serving Nut Thins + apple + cheese stick

## 2023-12-08 NOTE — PROGRESS NOTES
"Nutrition Assessment    Visit Type: insurance follow up  Session Time:  1 Hours  Reason for MNT visit: Pt in for education and nutrition counseling regarding Fatty Liver and Obesity and Insulin Resistance and Inflammation.     Age: 28 y.o.  Wt:   Wt Readings from Last 10 Encounters:   11/10/23 99.7 kg (219 lb 12.8 oz)   11/03/23 (P) 99.6 kg (219 lb 9.3 oz)   10/03/23 99.1 kg (218 lb 7.6 oz)   09/19/23 99.7 kg (219 lb 14.5 oz)   07/24/23 100.3 kg (221 lb 0.2 oz)   07/21/23 99.5 kg (219 lb 5.7 oz)   07/10/23 98.9 kg (218 lb 0.6 oz)   06/27/23 99.3 kg (218 lb 14.7 oz)   06/18/23 100.7 kg (222 lb)   06/13/23 101.6 kg (223 lb 15.8 oz)     Ht:   Ht Readings from Last 1 Encounters:   11/10/23 5' 6" (1.676 m)     BMI:   BMI Readings from Last 5 Encounters:   11/10/23 35.48 kg/m²   11/03/23 (P) 35.44 kg/m²   10/03/23 35.26 kg/m²   09/19/23 35.49 kg/m²   07/24/23 35.67 kg/m²       Client states:    11/03/23 She has recently been diagnosed with fatty liver, lupus, insulin resistance, and prediabetes. She feels very overwhelmed with all of the nutrition information. Mom is a RD, but does not work in lifestyle space. She also has been battling depression; does see therapist. Does not cook much at home maybe 1 night a week;  and self mostly dine out or [employer] most dinners. She does make breakfast and lunches at home most days. Currently walks for 30 minutes 1-2 times per week with a goal of walking 30 minutes most days. Does struggle with guilt around food choices. She has been on steroids for past few months, but has been able to maintain weight.   Discussed:  Fiber + fat + protein at most meals  Getting at least 20-30 grams of protein at breakfast and 30-40 grams of protein at lunch/dinner  Minimizing added sugars + fried foods + fatty foods for fatty liver  Choosing 1 starch per meal (example if wanting sandwich having lower carbohydrate side)  Being mindful of carbohydrates while on steroids for an extended " period  Increased protein needs  Hydration needs  Filling 1/2 plate with non-starchy vegetables  Did not discuss meal planning guide, or nutrition/, fueling on the go guide, physical activity recommendations    12/08/23  Trying to get more protein at breakfast. Recently started [employer] turkey sausage links, with scrambled eggs, + egg beaters, cottage cheese // adrien leonard delight breakfast sandwiches // Greek yogurt with berries. Have tried some new breads Nature's Own high fiber bread did not like; tried [employer]'s Killer Bread Good Seed. Premier protein shake + skinny syrups + PB2 + ice did not like the consistency very much. Is currently looking for guidance on anti-inflammatory diets.   Discussed:  1/2 - 2/3 plate plants  Limiting:  Cured meats  Fatty meats  Added sugars  Ultra processed foods  Fried foods  Alcohol  Promoting intake of:  Animal omega-3 sources  Vitamin D sources  Multiple colors on plate (at least 2-3)  High fiber  Beans & lentils  Fatty fish  Full-fat dairy  Importance of keeping the gut microbiota diverse and fed    Vitamins, Minerals, and/or Supplements:  not discussed     Medical History  Problem List             Resolved    Acne         Fatty liver         Chronic RUQ pain         Nausea         Hypertrophy of both inferior nasal turbinates         Nasal obstruction         Nasal septal deviation         Class 2 severe obesity due to excess calories with serious comorbidity and body mass index (BMI) of 35.0 to 35.9 in adult         Abnormal weight gain         Fatigue         Decreased libido         BUSTER positive            Past Medical History:   Diagnosis Date    Acne        Past Surgical History:   Procedure Laterality Date    NOSE SURGERY      ROBOT-ASSISTED CHOLECYSTECTOMY USING DA NICK XI N/A 1/7/2022    Procedure: XI ROBOTIC CHOLECYSTECTOMY;  Surgeon: Ernesto Plaza MD;  Location: [facility];  Service: General;  Laterality: N/A;        Medications    Prior to Admission  medications    Medication Sig Start Date End Date Taking? Authorizing Provider   hydroxychloroquine (PLAQUENIL) 200 mg tablet Take 1 tablet (200 mg total) by mouth 2 (two) times daily. 8/25/23   Pam Strong PA-C   metFORMIN (GLUCOPHAGE-XR) 500 MG ER 24hr tablet Take 1 tablet (500 mg total) by mouth once daily for 14 days, THEN 1 tablet (500 mg total) 2 (two) times daily with meals for 14 days. 10/4/23 11/1/23  Ana Rosa Thao MD   nitrofurantoin, macrocrystal-monohydrate, (MACROBID) 100 MG capsule Take 1 capsule (100 mg total) by mouth 2 (two) times daily.  Patient not taking: Reported on 10/3/2023 9/19/23   Love Reynolds MD   VIENVA 0.1-20 mg-mcg per tablet Take 1 tablet by mouth once daily. Patient to take continously. Ok to refill sooner than every three months. 5/18/23 5/17/24  Maliha Guerra MD        Social History    Marital status:      Social History     Tobacco Use    Smoking status: Never    Smokeless tobacco: Never   Substance Use Topics    Alcohol use: Yes     Alcohol/week: 2.0 standard drinks of alcohol     Types: 1 Glasses of wine, 1 Shots of liquor per week     Comment: occas       Labs   Reviewed and noted    Food Allergies or Intolerances:  NKFAI      Beverages  Water: ~40 ounces  Alcohol: rare  Coffee: 1 cup with fat-free coffee mate powder + sweet-n-low  Energy Drinks: none  Tea: zero sugar tea  Soda: diet or zero sugar  Milk: none  Juice: none  Other: zero sugar lemonade    24-hour Recall    Breakfast: 830 am // skips // packet grits + butter // 2 eggs + cottage cheese + spinach // honey wheat toast + avocado // assorted Greek yogurt  Lunch: 6260-2016 pm // sandwich (honey wheat, turkey, wiggins, avocado, green + chips) // wrap (rotisserie chicken, light caesar, lettuce, red onion) // bagel thin with light cream cheese + smoked salmon + arugula // air guerra chicken sausage + velveeta mac n' cheese cup + broccoli // pretzels crisps + mozzarella cheese  Dinner: dine out  most nights // cooks ~ 1 night per week  Snacks: smartfood pop corn // chips // skinny pop white cheddar // babybel white cheddar cheese wheels // sometimes: ice cream, outshine popsicles, cookies 2-3 nights per week     LIFESTYLE FACTORS    Dining out: 6+ x per week, mostly restaurants, mostly delivery  Types of restaurants/foods: door dash // asian // [employer]    Frequency of consumption of fried foods: 3+ times per week    Meal preparation/shopping: self and  // [employer] or monalisa ochoa    Sleep: fair  Hours: 6-7 hours of sleep, has recently started waking ~ 2-3 am and not falling back asleep  Wake: 630-7 am  Sleep: in bed by 10 pm, reads until midnight    Stress Level: high  Stress management: therapy    Support System:  spouse    Physical Activity: Sedentary (little or no exercise)  Types of activity: goal is to walk for at least 30 minutes  Frequency of activity: currently 1-2x per week goal of 5 days    Diagnosis    Inadequate fluid intake related to low intake of water or other fluids as evidenced by 24-hour food recall and patient report.    Intervention    Estimated Energy Requirements:   Calories: 1800  Protein: 135 grams   Carbohydrates: 180 grams   Fat: 60 grams   Baseline for fluids: 100 ounces + sweat loss    Written Materials Provided  [employer] Eating Plate  RD contact information    Goals:  1.  Drink 2 Alfonso's of water per day  2.  Get at least 20 grams of protein at breakfast  3.  Do not skip any meals/snacks      Monitoring/Evaluation    Monitor the following:  Weight  Sleep  Stress Management  Movement    Communicated with healthcare provider and documented plan for referral to appropriate agency/healthcare provider as needed    Supervising Physician: Ana Rosa Thao MD    Patient motivation, anticipated barriers, expected compliance: Patient is motivated and has verbalized understanding and intent to comply.     Comprehension: fair     Follow-up: PRN

## 2024-01-23 ENCOUNTER — PATIENT MESSAGE (OUTPATIENT)
Dept: RHEUMATOLOGY | Facility: CLINIC | Age: 29
End: 2024-01-23
Payer: COMMERCIAL

## 2024-01-23 DIAGNOSIS — R76.8 DS DNA ANTIBODY POSITIVE: ICD-10-CM

## 2024-01-23 DIAGNOSIS — R70.0 ELEVATED SED RATE: ICD-10-CM

## 2024-01-23 DIAGNOSIS — R76.8 POSITIVE ANA (ANTINUCLEAR ANTIBODY): ICD-10-CM

## 2024-01-24 RX ORDER — HYDROXYCHLOROQUINE SULFATE 200 MG/1
200 TABLET, FILM COATED ORAL 2 TIMES DAILY
Qty: 60 TABLET | Refills: 5 | Status: SHIPPED | OUTPATIENT
Start: 2024-01-24 | End: 2024-06-04

## 2024-03-01 ENCOUNTER — LAB VISIT (OUTPATIENT)
Dept: LAB | Facility: HOSPITAL | Age: 29
End: 2024-03-01
Attending: PHYSICIAN ASSISTANT
Payer: COMMERCIAL

## 2024-03-01 DIAGNOSIS — R76.8 POSITIVE ANA (ANTINUCLEAR ANTIBODY): ICD-10-CM

## 2024-03-01 DIAGNOSIS — R76.8 DS DNA ANTIBODY POSITIVE: ICD-10-CM

## 2024-03-01 LAB
ALBUMIN SERPL BCP-MCNC: 3.8 G/DL (ref 3.5–5.2)
ALP SERPL-CCNC: 93 U/L (ref 55–135)
ALT SERPL W/O P-5'-P-CCNC: 69 U/L (ref 10–44)
ANION GAP SERPL CALC-SCNC: 10 MMOL/L (ref 8–16)
AST SERPL-CCNC: 38 U/L (ref 10–40)
BACTERIA #/AREA URNS HPF: ABNORMAL /HPF
BASOPHILS # BLD AUTO: 0.09 K/UL (ref 0–0.2)
BASOPHILS NFR BLD: 1.3 % (ref 0–1.9)
BILIRUB SERPL-MCNC: 0.4 MG/DL (ref 0.1–1)
BUN SERPL-MCNC: 12 MG/DL (ref 6–20)
C3 SERPL-MCNC: 196 MG/DL (ref 50–180)
C4 SERPL-MCNC: 42 MG/DL (ref 11–44)
CALCIUM SERPL-MCNC: 9.2 MG/DL (ref 8.7–10.5)
CHLORIDE SERPL-SCNC: 105 MMOL/L (ref 95–110)
CO2 SERPL-SCNC: 22 MMOL/L (ref 23–29)
CREAT SERPL-MCNC: 0.9 MG/DL (ref 0.5–1.4)
CREAT UR-MCNC: 86.7 MG/DL (ref 15–325)
CRP SERPL-MCNC: 20.1 MG/L (ref 0–8.2)
DIFFERENTIAL METHOD BLD: NORMAL
EOSINOPHIL # BLD AUTO: 0.1 K/UL (ref 0–0.5)
EOSINOPHIL NFR BLD: 1.5 % (ref 0–8)
ERYTHROCYTE [DISTWIDTH] IN BLOOD BY AUTOMATED COUNT: 11.7 % (ref 11.5–14.5)
ERYTHROCYTE [SEDIMENTATION RATE] IN BLOOD BY WESTERGREN METHOD: 24 MM/HR (ref 0–20)
EST. GFR  (NO RACE VARIABLE): >60 ML/MIN/1.73 M^2
GLUCOSE SERPL-MCNC: 85 MG/DL (ref 70–110)
HCT VFR BLD AUTO: 42.7 % (ref 37–48.5)
HGB BLD-MCNC: 14.2 G/DL (ref 12–16)
IMM GRANULOCYTES # BLD AUTO: 0.02 K/UL (ref 0–0.04)
IMM GRANULOCYTES NFR BLD AUTO: 0.3 % (ref 0–0.5)
LYMPHOCYTES # BLD AUTO: 2.2 K/UL (ref 1–4.8)
LYMPHOCYTES NFR BLD: 32.9 % (ref 18–48)
MCH RBC QN AUTO: 29.6 PG (ref 27–31)
MCHC RBC AUTO-ENTMCNC: 33.3 G/DL (ref 32–36)
MCV RBC AUTO: 89 FL (ref 82–98)
MICROSCOPIC COMMENT: ABNORMAL
MONOCYTES # BLD AUTO: 0.3 K/UL (ref 0.3–1)
MONOCYTES NFR BLD: 5 % (ref 4–15)
NEUTROPHILS # BLD AUTO: 4 K/UL (ref 1.8–7.7)
NEUTROPHILS NFR BLD: 59 % (ref 38–73)
NRBC BLD-RTO: 0 /100 WBC
PLATELET # BLD AUTO: 268 K/UL (ref 150–450)
PMV BLD AUTO: 11.8 FL (ref 9.2–12.9)
POTASSIUM SERPL-SCNC: 4.1 MMOL/L (ref 3.5–5.1)
PROT SERPL-MCNC: 8 G/DL (ref 6–8.4)
PROT UR-MCNC: <7 MG/DL (ref 0–15)
PROT/CREAT UR: NORMAL MG/G{CREAT} (ref 0–0.2)
RBC # BLD AUTO: 4.8 M/UL (ref 4–5.4)
RBC #/AREA URNS HPF: 5 /HPF (ref 0–4)
SODIUM SERPL-SCNC: 137 MMOL/L (ref 136–145)
SQUAMOUS #/AREA URNS HPF: 2 /HPF
WBC # BLD AUTO: 6.74 K/UL (ref 3.9–12.7)
WBC #/AREA URNS HPF: 17 /HPF (ref 0–5)
WBC CLUMPS URNS QL MICRO: ABNORMAL

## 2024-03-01 PROCEDURE — 86225 DNA ANTIBODY NATIVE: CPT | Mod: 59 | Performed by: PHYSICIAN ASSISTANT

## 2024-03-01 PROCEDURE — 81000 URINALYSIS NONAUTO W/SCOPE: CPT | Performed by: PHYSICIAN ASSISTANT

## 2024-03-01 PROCEDURE — 86334 IMMUNOFIX E-PHORESIS SERUM: CPT | Mod: 26,,, | Performed by: PATHOLOGY

## 2024-03-01 PROCEDURE — 86160 COMPLEMENT ANTIGEN: CPT | Mod: 59 | Performed by: PHYSICIAN ASSISTANT

## 2024-03-01 PROCEDURE — 84156 ASSAY OF PROTEIN URINE: CPT | Performed by: PHYSICIAN ASSISTANT

## 2024-03-01 PROCEDURE — 82595 ASSAY OF CRYOGLOBULIN: CPT | Performed by: PHYSICIAN ASSISTANT

## 2024-03-01 PROCEDURE — 86140 C-REACTIVE PROTEIN: CPT | Performed by: PHYSICIAN ASSISTANT

## 2024-03-01 PROCEDURE — 85025 COMPLETE CBC W/AUTO DIFF WBC: CPT | Performed by: PHYSICIAN ASSISTANT

## 2024-03-01 PROCEDURE — 84165 PROTEIN E-PHORESIS SERUM: CPT | Performed by: PHYSICIAN ASSISTANT

## 2024-03-01 PROCEDURE — 36415 COLL VENOUS BLD VENIPUNCTURE: CPT | Mod: PO | Performed by: PHYSICIAN ASSISTANT

## 2024-03-01 PROCEDURE — 86160 COMPLEMENT ANTIGEN: CPT | Performed by: PHYSICIAN ASSISTANT

## 2024-03-01 PROCEDURE — 80053 COMPREHEN METABOLIC PANEL: CPT | Performed by: PHYSICIAN ASSISTANT

## 2024-03-01 PROCEDURE — 84165 PROTEIN E-PHORESIS SERUM: CPT | Mod: 26,,, | Performed by: PATHOLOGY

## 2024-03-01 PROCEDURE — 85651 RBC SED RATE NONAUTOMATED: CPT | Performed by: PHYSICIAN ASSISTANT

## 2024-03-01 PROCEDURE — 86225 DNA ANTIBODY NATIVE: CPT | Performed by: PHYSICIAN ASSISTANT

## 2024-03-01 PROCEDURE — 86334 IMMUNOFIX E-PHORESIS SERUM: CPT | Performed by: PHYSICIAN ASSISTANT

## 2024-03-04 LAB
ALBUMIN SERPL ELPH-MCNC: 3.96 G/DL (ref 3.35–5.55)
ALPHA1 GLOB SERPL ELPH-MCNC: 0.38 G/DL (ref 0.17–0.41)
ALPHA2 GLOB SERPL ELPH-MCNC: 0.88 G/DL (ref 0.43–0.99)
B-GLOBULIN SERPL ELPH-MCNC: 0.99 G/DL (ref 0.5–1.1)
GAMMA GLOB SERPL ELPH-MCNC: 1.3 G/DL (ref 0.67–1.58)
INTERPRETATION SERPL IFE-IMP: NORMAL
PROT SERPL-MCNC: 7.5 G/DL (ref 6–8.4)

## 2024-03-05 LAB
DNA TITER: 2560
DSDNA AB SER-ACNC: POSITIVE [IU]/ML
PATHOLOGIST INTERPRETATION IFE: NORMAL
PATHOLOGIST INTERPRETATION SPE: NORMAL

## 2024-03-07 ENCOUNTER — OFFICE VISIT (OUTPATIENT)
Dept: URGENT CARE | Facility: CLINIC | Age: 29
End: 2024-03-07
Payer: COMMERCIAL

## 2024-03-07 VITALS
WEIGHT: 220.44 LBS | OXYGEN SATURATION: 99 % | RESPIRATION RATE: 16 BRPM | TEMPERATURE: 98 F | HEART RATE: 97 BPM | HEIGHT: 69 IN | BODY MASS INDEX: 32.65 KG/M2 | DIASTOLIC BLOOD PRESSURE: 82 MMHG | SYSTOLIC BLOOD PRESSURE: 124 MMHG

## 2024-03-07 DIAGNOSIS — R09.81 NASAL CONGESTION: ICD-10-CM

## 2024-03-07 DIAGNOSIS — R05.9 COUGH, UNSPECIFIED TYPE: ICD-10-CM

## 2024-03-07 DIAGNOSIS — H65.91 RIGHT NON-SUPPURATIVE OTITIS MEDIA: ICD-10-CM

## 2024-03-07 DIAGNOSIS — B96.89 BACTERIAL SINUSITIS: Primary | ICD-10-CM

## 2024-03-07 DIAGNOSIS — J32.9 BACTERIAL SINUSITIS: Primary | ICD-10-CM

## 2024-03-07 PROCEDURE — 99213 OFFICE O/P EST LOW 20 MIN: CPT | Mod: S$GLB,,, | Performed by: PHYSICIAN ASSISTANT

## 2024-03-07 RX ORDER — BUPROPION HYDROCHLORIDE 150 MG/1
150 TABLET, EXTENDED RELEASE ORAL 2 TIMES DAILY
COMMUNITY
End: 2024-05-14 | Stop reason: ALTCHOICE

## 2024-03-07 RX ORDER — AMOXICILLIN AND CLAVULANATE POTASSIUM 875; 125 MG/1; MG/1
1 TABLET, FILM COATED ORAL 2 TIMES DAILY
Qty: 20 TABLET | Refills: 0 | Status: SHIPPED | OUTPATIENT
Start: 2024-03-07 | End: 2024-06-04

## 2024-03-07 RX ORDER — FLUTICASONE PROPIONATE 50 MCG
1 SPRAY, SUSPENSION (ML) NASAL DAILY
Qty: 16 ML | Refills: 0 | Status: SHIPPED | OUTPATIENT
Start: 2024-03-07 | End: 2024-06-04

## 2024-03-07 NOTE — PROGRESS NOTES
"Subjective:      Patient ID: Charlotte Patricia is a 29 y.o. female.    Vitals:  height is 5' 9" (1.753 m) and weight is 100 kg (220 lb 7.4 oz). Her oral temperature is 98 °F (36.7 °C). Her blood pressure is 124/82 and her pulse is 97. Her respiration is 16 and oxygen saturation is 99%.     Chief Complaint: Sinus Problem (Ear pressure for 5 days/)    Pt presents to the clinic today with a productive cough,nasal congestion, post nasal drip, and bi-lateral ear congestion that all began 5 days ago. States ear pain got worse yesterday; feels full (R>L). Reports ear popping especially with blowing her nose. No fever/chills.     Sinus Problem  This is a new problem. The current episode started in the past 7 days. The problem has been gradually worsening since onset. There has been no fever. Her pain is at a severity of 6/10 (ear pressure). The pain is moderate. Associated symptoms include congestion, coughing, ear pain, sinus pressure and sneezing. Pertinent negatives include no chills, diaphoresis, headaches, hoarse voice, neck pain, shortness of breath, sore throat or swollen glands. (Generalized body aches) Treatments tried: Mucinex, ibuprofen, Dayquil, Nyquil. The treatment provided mild relief.       Constitution: Negative for chills, sweating and fever.   HENT:  Positive for ear pain, congestion and sinus pressure. Negative for ear discharge and sore throat.    Neck: Negative for neck pain.   Cardiovascular: Negative.    Respiratory:  Positive for cough. Negative for shortness of breath.    Gastrointestinal: Negative.    Musculoskeletal: Negative.    Allergic/Immunologic: Positive for sneezing.   Neurological:  Negative for dizziness and headaches.      Objective:     Physical Exam   Constitutional: She appears well-developed.  Non-toxic appearance. She appears ill. No distress.   HENT:   Head: Normocephalic and atraumatic.   Ears:   Right Ear: External ear and ear canal normal. Tympanic membrane is erythematous " and bulging. Tympanic membrane is not perforated.   Left Ear: Tympanic membrane, external ear and ear canal normal.   Nose: Rhinorrhea and congestion present.   Mouth/Throat: Mucous membranes are moist. Oropharynx is clear.   Eyes: Conjunctivae and EOM are normal.   Neck: Neck supple.   Pulmonary/Chest: Effort normal and breath sounds normal.   Abdominal: Normal appearance.   Musculoskeletal: Normal range of motion.         General: Normal range of motion.   Neurological: no focal deficit. She is alert. She displays no weakness. Gait normal.   Skin: Skin is warm, dry, not diaphoretic, not pale and no rash.   Psychiatric: Her behavior is normal.       Assessment:     1. Bacterial sinusitis    2. Right non-suppurative otitis media    3. Cough, unspecified type    4. Nasal congestion        Plan:       Bacterial sinusitis  -     amoxicillin-clavulanate 875-125mg (AUGMENTIN) 875-125 mg per tablet; Take 1 tablet by mouth 2 (two) times daily.  Dispense: 20 tablet; Refill: 0  -     fluticasone propionate (FLONASE) 50 mcg/actuation nasal spray; 1 spray (50 mcg total) by Each Nostril route once daily.  Dispense: 16 mL; Refill: 0    Right non-suppurative otitis media  -     amoxicillin-clavulanate 875-125mg (AUGMENTIN) 875-125 mg per tablet; Take 1 tablet by mouth 2 (two) times daily.  Dispense: 20 tablet; Refill: 0    Cough, unspecified type    Nasal congestion  -     fluticasone propionate (FLONASE) 50 mcg/actuation nasal spray; 1 spray (50 mcg total) by Each Nostril route once daily.  Dispense: 16 mL; Refill: 0

## 2024-03-11 ENCOUNTER — OFFICE VISIT (OUTPATIENT)
Dept: RHEUMATOLOGY | Facility: CLINIC | Age: 29
End: 2024-03-11
Payer: COMMERCIAL

## 2024-03-11 VITALS
HEIGHT: 69 IN | WEIGHT: 221.13 LBS | BODY MASS INDEX: 32.75 KG/M2 | DIASTOLIC BLOOD PRESSURE: 86 MMHG | SYSTOLIC BLOOD PRESSURE: 123 MMHG | HEART RATE: 94 BPM

## 2024-03-11 DIAGNOSIS — Z79.899 LONG-TERM USE OF PLAQUENIL: ICD-10-CM

## 2024-03-11 DIAGNOSIS — Z79.899 IMMUNOCOMPROMISED STATE DUE TO DRUG THERAPY: ICD-10-CM

## 2024-03-11 DIAGNOSIS — M32.9 LUPUS ARTHRITIS: Primary | ICD-10-CM

## 2024-03-11 DIAGNOSIS — Z51.81 MEDICATION MONITORING ENCOUNTER: ICD-10-CM

## 2024-03-11 DIAGNOSIS — M35.00 SJOGREN'S SYNDROME, WITH UNSPECIFIED ORGAN INVOLVEMENT: ICD-10-CM

## 2024-03-11 DIAGNOSIS — Z79.899 HIGH RISK MEDICATION USE: ICD-10-CM

## 2024-03-11 DIAGNOSIS — M35.00 SICCA SYNDROME: ICD-10-CM

## 2024-03-11 DIAGNOSIS — R53.83 FATIGUE, UNSPECIFIED TYPE: ICD-10-CM

## 2024-03-11 DIAGNOSIS — D84.821 IMMUNOCOMPROMISED STATE DUE TO DRUG THERAPY: ICD-10-CM

## 2024-03-11 LAB — CRYOGLOB SER QL: NORMAL

## 2024-03-11 PROCEDURE — 3008F BODY MASS INDEX DOCD: CPT | Mod: CPTII,S$GLB,, | Performed by: PHYSICIAN ASSISTANT

## 2024-03-11 PROCEDURE — 3079F DIAST BP 80-89 MM HG: CPT | Mod: CPTII,S$GLB,, | Performed by: PHYSICIAN ASSISTANT

## 2024-03-11 PROCEDURE — 1160F RVW MEDS BY RX/DR IN RCRD: CPT | Mod: CPTII,S$GLB,, | Performed by: PHYSICIAN ASSISTANT

## 2024-03-11 PROCEDURE — 99999 PR PBB SHADOW E&M-EST. PATIENT-LVL IV: CPT | Mod: PBBFAC,,, | Performed by: PHYSICIAN ASSISTANT

## 2024-03-11 PROCEDURE — 99214 OFFICE O/P EST MOD 30 MIN: CPT | Mod: S$GLB,,, | Performed by: PHYSICIAN ASSISTANT

## 2024-03-11 PROCEDURE — 1159F MED LIST DOCD IN RCRD: CPT | Mod: CPTII,S$GLB,, | Performed by: PHYSICIAN ASSISTANT

## 2024-03-11 PROCEDURE — 3074F SYST BP LT 130 MM HG: CPT | Mod: CPTII,S$GLB,, | Performed by: PHYSICIAN ASSISTANT

## 2024-03-11 NOTE — PATIENT INSTRUCTIONS
We will send the prescription for Naltrexone 4.5 mg to [facility].  There phone number is [phone]

## 2024-03-11 NOTE — PROGRESS NOTES
Subjective:      Patient ID: Charlotte Patricia is a 29 y.o. female.    Chief Complaint: Lupus    HPI   Charlotte Patricia  is a 29 y.o. female seen today for f/u SLE diagnosed by +BUSTER w +dsDNA and joint pain/stiffness.  Initially seen in hepatology for for elevated liver enzymes.  On  mg bid.  Failed monotherapy so I added benlysta SQ in late November.  She is tolerating the injections well.  She has seen improvement in both joint pain and joint stiffness.  Denies prolonged morning stiffness.  Off all steroids.      C/o photosensitivity in the past so avoids the sun when she can.  Using SPF > 30 when outside.   Denies SOB and chest pain.  She denies fevers, chills, photosensitivity, eye pain, hematuria, blood in the stool, rash, sicca symptoms, raynauds, finger/oral/nasal ulcerations, LAD, weakness, hair loss.  Rheumatologic systems otherwise negative.    Serologies/Labs:  +BUSTER >=1:2560 (homogenous) and 1:640 speckled  +SSB, +dsDNA (>=1:5120)  Neg anti mitochondrial, anti smooth muscle  Neg Scl 70/rna polymerase  Current Treatment:   mg bid  Benlysta SQ weekly  Previous Treatment:   na      Current Outpatient Medications:     amoxicillin-clavulanate 875-125mg (AUGMENTIN) 875-125 mg per tablet, Take 1 tablet by mouth 2 (two) times daily., Disp: 20 tablet, Rfl: 0    belimumab (BENLYSTA) 200 mg/mL AtIn, Inject 1 mL (200 mg total) into the skin every 7 days., Disp: 4 mL, Rfl: 4    buPROPion (WELLBUTRIN SR) 150 MG TBSR 12 hr tablet, Take 150 mg by mouth 2 (two) times daily., Disp: , Rfl:     fluticasone propionate (FLONASE) 50 mcg/actuation nasal spray, 1 spray (50 mcg total) by Each Nostril route once daily., Disp: 16 mL, Rfl: 0    hydroxychloroquine (PLAQUENIL) 200 mg tablet, Take 1 tablet (200 mg total) by mouth 2 (two) times daily., Disp: 60 tablet, Rfl: 5    VIENVA 0.1-20 mg-mcg per tablet, Take 1 tablet by mouth once daily. Patient to take continously. Ok to refill sooner than every three  "months., Disp: 90 tablet, Rfl: 3    metFORMIN (GLUCOPHAGE-XR) 500 MG ER 24hr tablet, Take 1 tablet (500 mg total) by mouth once daily for 14 days, THEN 1 tablet (500 mg total) 2 (two) times daily with meals for 14 days., Disp: 60 tablet, Rfl: 2    naltrexone capsule, Take 1 capsule (4.5 mg total) by mouth every evening., Disp: 30 capsule, Rfl: 1  No current facility-administered medications for this visit.    Facility-Administered Medications Ordered in Other Visits:     lactated ringers infusion, , Intravenous, Continuous, Marj Dejesus MD, New Bag at 02/02/22 0700    Past Medical History:   Diagnosis Date    Acne     Depression     Systemic lupus erythematosus, organ or system involvement unspecified      Family History   Problem Relation Age of Onset    Hyperlipidemia Mother     Hypertension Father     Heart disease Maternal Aunt     Heart disease Maternal Uncle     Cancer Paternal Uncle         bone    Heart disease Maternal Grandmother     Hypertension Maternal Grandfather     Cancer Maternal Grandfather     Colon cancer Paternal Grandfather      Social History     Socioeconomic History    Marital status:     Number of children: 0   Occupational History    Occupation: Student- Civil Engineering [employer]   Tobacco Use    Smoking status: Never     Passive exposure: Never    Smokeless tobacco: Never   Substance and Sexual Activity    Alcohol use: Yes     Alcohol/week: 2.0 standard drinks of alcohol     Types: 1 Glasses of wine, 1 Shots of liquor per week     Comment: occas    Drug use: No    Sexual activity: Yes     Partners: Male     Birth control/protection: Pill, OCP   Social History Narrative    ** Merged History Encounter **          Review of patient's allergies indicates:   Allergen Reactions    Sulfa (sulfonamide antibiotics) Rash       Objective:   /86   Pulse 94   Ht 5' 9" (1.753 m)   Wt 100.3 kg (221 lb 1.9 oz)   LMP 02/10/2024 (Approximate)   BMI 32.65 kg/m²   Immunization History "   Administered Date(s) Administered    COVID-19, MRNA, LN-S, PF (Pfizer) (Purple Cap) 04/01/2021, 04/22/2021, 12/30/2021    DTP 1995, 1995, 1995, 10/10/1997    DTP / HiB 1995    DTaP 09/28/1999    HIB 1995, 1995, 1995, 10/10/1997    Hepatitis A, Pediatric/Adolescent, 2 Dose 11/21/2006, 05/31/2007    Hepatitis B, Pediatric/Adolescent 1995, 1995, 1995    Influenza (FLUAD) - Quadrivalent - Adjuvanted - PF *Preferred* (65+) 11/10/2023    Influenza (Flumist) - Quadrivalent - Intranasal *Preferred* (2-49 years old) 11/06/2012    Influenza - Intranasal 11/06/2012    Influenza - Intranasal - Trivalent 11/02/2009, 11/17/2010, 10/19/2011    Influenza - Quadrivalent - MDCK - PF 12/30/2021    Influenza - Quadrivalent - PF *Preferred* (6 months and older) 11/18/2019, 11/14/2020    Influenza Split 12/08/2003, 01/12/2004, 11/09/2005, 11/21/2006, 10/30/2007, 10/30/2008    MMR 04/12/1996, 09/28/1999    Meningococcal Conjugate (MCV4P) 11/21/2006    OPV 1995, 1995, 1995, 09/28/1999    Pneumococcal Conjugate - 20 Valent 09/19/2023    Tdap 11/21/2006, 09/19/2023    Varicella 04/12/1996, 02/04/2009       Physical Exam   Constitutional: She is oriented to person, place, and time. No distress.   HENT:   Head: Normocephalic and atraumatic.   Pulmonary/Chest: Effort normal.   Abdominal: She exhibits no distension.   Musculoskeletal:         General: No swelling or tenderness. Normal range of motion.      Cervical back: Normal range of motion.   Lymphadenopathy:     She has no cervical adenopathy.   Neurological: She is alert and oriented to person, place, and time.   Skin: Skin is warm and dry. No rash noted.   Psychiatric: Mood normal.   Nursing note and vitals reviewed.    No synovitis, no dactylitis, no enthesitis  No effusions of large or small joints  100% fist formation  Well preserved ROM      Recent Results (from the past 672 hour(s))   Protein/Creatinine  Ratio, Urine    Collection Time: 03/01/24 11:53 AM   Result Value Ref Range    Protein, Urine Random <7 0 - 15 mg/dL    Creatinine, Urine 86.7 15.0 - 325.0 mg/dL    Prot/Creat Ratio, Urine Unable to calculate 0.00 - 0.20   Urinalysis Microscopic    Collection Time: 03/01/24 11:53 AM   Result Value Ref Range    RBC, UA 5 (H) 0 - 4 /hpf    WBC, UA 17 (H) 0 - 5 /hpf    WBC Clumps, UA Moderate (A) None-Rare    Bacteria Few (A) None-Occ /hpf    Squam Epithel, UA 2 /hpf    Microscopic Comment SEE COMMENT    Comprehensive Metabolic Panel    Collection Time: 03/01/24 12:15 PM   Result Value Ref Range    Sodium 137 136 - 145 mmol/L    Potassium 4.1 3.5 - 5.1 mmol/L    Chloride 105 95 - 110 mmol/L    CO2 22 (L) 23 - 29 mmol/L    Glucose 85 70 - 110 mg/dL    BUN 12 6 - 20 mg/dL    Creatinine 0.9 0.5 - 1.4 mg/dL    Calcium 9.2 8.7 - 10.5 mg/dL    Total Protein 8.0 6.0 - 8.4 g/dL    Albumin 3.8 3.5 - 5.2 g/dL    Total Bilirubin 0.4 0.1 - 1.0 mg/dL    Alkaline Phosphatase 93 55 - 135 U/L    AST 38 10 - 40 U/L    ALT 69 (H) 10 - 44 U/L    eGFR >60 >60 mL/min/1.73 m^2    Anion Gap 10 8 - 16 mmol/L   CBC Auto Differential    Collection Time: 03/01/24 12:15 PM   Result Value Ref Range    WBC 6.74 3.90 - 12.70 K/uL    RBC 4.80 4.00 - 5.40 M/uL    Hemoglobin 14.2 12.0 - 16.0 g/dL    Hematocrit 42.7 37.0 - 48.5 %    MCV 89 82 - 98 fL    MCH 29.6 27.0 - 31.0 pg    MCHC 33.3 32.0 - 36.0 g/dL    RDW 11.7 11.5 - 14.5 %    Platelets 268 150 - 450 K/uL    MPV 11.8 9.2 - 12.9 fL    Immature Granulocytes 0.3 0.0 - 0.5 %    Gran # (ANC) 4.0 1.8 - 7.7 K/uL    Immature Grans (Abs) 0.02 0.00 - 0.04 K/uL    Lymph # 2.2 1.0 - 4.8 K/uL    Mono # 0.3 0.3 - 1.0 K/uL    Eos # 0.1 0.0 - 0.5 K/uL    Baso # 0.09 0.00 - 0.20 K/uL    nRBC 0 0 /100 WBC    Gran % 59.0 38.0 - 73.0 %    Lymph % 32.9 18.0 - 48.0 %    Mono % 5.0 4.0 - 15.0 %    Eosinophil % 1.5 0.0 - 8.0 %    Basophil % 1.3 0.0 - 1.9 %    Differential Method Automated    Sedimentation rate     Collection Time: 03/01/24 12:15 PM   Result Value Ref Range    Sed Rate 24 (H) 0 - 20 mm/Hr   C-Reactive Protein    Collection Time: 03/01/24 12:15 PM   Result Value Ref Range    CRP 20.1 (H) 0.0 - 8.2 mg/L   Anti-DNA Ab, Double-Stranded    Collection Time: 03/01/24 12:15 PM   Result Value Ref Range    ds DNA Ab Positive (A) Negative 1:10   C4 Complement    Collection Time: 03/01/24 12:15 PM   Result Value Ref Range    Complement (C-4) 42 11 - 44 mg/dL   C3 Complement    Collection Time: 03/01/24 12:15 PM   Result Value Ref Range    Complement (C-3) 196 (H) 50 - 180 mg/dL   Protein Electrophoresis, Serum    Collection Time: 03/01/24 12:15 PM   Result Value Ref Range    Protein, Serum 7.5 6.0 - 8.4 g/dL    Albumin 3.96 3.35 - 5.55 g/dL    Alpha-1 0.38 0.17 - 0.41 g/dL    Alpha-2 0.88 0.43 - 0.99 g/dL    Beta 0.99 0.50 - 1.10 g/dL    Gamma 1.30 0.67 - 1.58 g/dL   Immunofixation Electrophoresis    Collection Time: 03/01/24 12:15 PM   Result Value Ref Range    Immunofix Interp. SEE COMMENT    Pathologist Interpretation ARABELLA    Collection Time: 03/01/24 12:15 PM   Result Value Ref Range    Pathologist Interpretation ARABELLA REVIEWED    Pathologist Interpretation SPE    Collection Time: 03/01/24 12:15 PM   Result Value Ref Range    Pathologist Interpretation SPE REVIEWED    DNA Titer    Collection Time: 03/01/24 12:15 PM   Result Value Ref Range    DNA Titer 2560        Lab Results   Component Value Date    TBGOLDPLUS Negative 11/10/2023      Lab Results   Component Value Date    HEPBCAB Non-reactive 11/10/2023    HEPCAB Non-reactive 11/10/2023      Assessment:     1. Lupus arthritis    2. Sicca syndrome    3. Sjogren's syndrome, with unspecified organ involvement    4. Immunocompromised state due to drug therapy    5. High risk medication use    6. Long-term use of Plaquenil    7. Medication monitoring encounter    8. Fatigue, unspecified type          Plan:     Charlotte was seen today for lupus.    Diagnoses and all orders for  this visit:    Lupus arthritis    Sicca syndrome    Sjogren's syndrome, with unspecified organ involvement    Immunocompromised state due to drug therapy    High risk medication use    Long-term use of Plaquenil    Medication monitoring encounter    Fatigue, unspecified type  -     naltrexone capsule; Take 1 capsule (4.5 mg total) by mouth every evening.      SLE w worsening lupus arthritis  Eular Criteria  +BUSTER w   +dsDNA   Lupus arthritis - Joint pain and prolonged am stiffness  photosensitivity  Labs reviewed as above  dsDNA titer 2560  No leukopenia, thrombocytopenia  LFTs improving  Has seen hepatology in the past  FibroScan summer 2023 shows significant steatosis without fibrosis  C3/C4 w some elevation  ESR/CRP both elevated but trending down  No proteinuria  C/w hcq 200 mg bid - has had baseline exam  C/w benlysta 200 mg SQ every week  Not planning to get pregnant in the immediate future  currently on birth control.   Considering trying to get pregnant in the next few years.  Advise patient she would need to be off Benlysta for a minimum of 4 months before trying to get pregnant  Discussed disease course as it pertains to pregnancy  States she would like to consider getting pregnant in the next 1-2 years  Arava not appropriate for consideration of contraception  Patient would need to be off methotrexate x6 months prior to conceiving  Briefly discussed Imuran as an option down the road.  TPMT done - normal metabolizer  Elevated LFTs  Trending down  F/u w hepatology  Xerostomia in conjunctino w +SSA - likely overlapping SS  SPEP and ARABELLA wnl  Cryoglobulins pending  No parotitis/parotid swelling  Drug therapy requiring intensive monitoring for toxicity  High Risk Medication Monitoring encounter  No current medication related issues, no evidence of toxicity  I ordered labs for toxicity monitoring, have personally reviewed the findings, and discussed them with the patient.  Pending labs will be sent via the  portal  Compromised immune system secondary to autoimmune disease and/or use of immunosuppressive drugs.  Monitor carefully for infections.  Advised patient to get immediate medical care if any infection arises.  Also advised strict adherence age-appropriate vaccinations and cancer screenings with PCP.  Patient advised to hold DMARD and/or biologic therapy for signs of infection or for surgery. If you are unsure what to do please call our office for instruction.[facility] [phone]  Return to clinic: 4 mos w SLE labs prior    Follow up in about 4 months (around 7/11/2024).    The patient understands, chooses and consents to this plan and accepts all the risks which include but are not limited to the risks mentioned above.     Disclaimer: This note was prepared using a voice recognition system and is likely to have sound alike errors within the text.

## 2024-04-06 DIAGNOSIS — M32.9 LUPUS ARTHRITIS: ICD-10-CM

## 2024-04-07 RX ORDER — BELIMUMAB 200 MG/ML
SOLUTION SUBCUTANEOUS
Qty: 4 ML | Refills: 4 | Status: SHIPPED | OUTPATIENT
Start: 2024-04-07

## 2024-04-10 ENCOUNTER — PATIENT MESSAGE (OUTPATIENT)
Dept: RHEUMATOLOGY | Facility: CLINIC | Age: 29
End: 2024-04-10
Payer: COMMERCIAL

## 2024-04-11 NOTE — TELEPHONE ENCOUNTER
Medication review performed for DDI with Wegovy. Noted potential for enhanced hypoglycemic effects w/ coadmin with hydroxychloroquine, metformin, Vienva. Alert noted for PMH of fatty liver disease w/ meftormin, wellbutrin, Vieinva.

## 2024-04-16 ENCOUNTER — PATIENT MESSAGE (OUTPATIENT)
Dept: PRIMARY CARE CLINIC | Facility: CLINIC | Age: 29
End: 2024-04-16

## 2024-04-16 ENCOUNTER — OFFICE VISIT (OUTPATIENT)
Dept: PRIMARY CARE CLINIC | Facility: CLINIC | Age: 29
End: 2024-04-16
Payer: COMMERCIAL

## 2024-04-16 VITALS
OXYGEN SATURATION: 97 % | TEMPERATURE: 97 F | SYSTOLIC BLOOD PRESSURE: 122 MMHG | DIASTOLIC BLOOD PRESSURE: 84 MMHG | RESPIRATION RATE: 18 BRPM | BODY MASS INDEX: 35.15 KG/M2 | WEIGHT: 218.69 LBS | HEART RATE: 94 BPM | HEIGHT: 66 IN

## 2024-04-16 DIAGNOSIS — R76.8 ANA POSITIVE: ICD-10-CM

## 2024-04-16 DIAGNOSIS — R73.03 PREDIABETES: ICD-10-CM

## 2024-04-16 DIAGNOSIS — M32.9 LUPUS ARTHRITIS: ICD-10-CM

## 2024-04-16 DIAGNOSIS — E66.01 CLASS 2 SEVERE OBESITY DUE TO EXCESS CALORIES WITH SERIOUS COMORBIDITY AND BODY MASS INDEX (BMI) OF 35.0 TO 35.9 IN ADULT: ICD-10-CM

## 2024-04-16 DIAGNOSIS — K76.0 FATTY LIVER: ICD-10-CM

## 2024-04-16 DIAGNOSIS — F41.9 ANXIETY: ICD-10-CM

## 2024-04-16 PROCEDURE — 3074F SYST BP LT 130 MM HG: CPT | Mod: CPTII,S$GLB,, | Performed by: FAMILY MEDICINE

## 2024-04-16 PROCEDURE — 3008F BODY MASS INDEX DOCD: CPT | Mod: CPTII,S$GLB,, | Performed by: FAMILY MEDICINE

## 2024-04-16 PROCEDURE — 99999 PR PBB SHADOW E&M-EST. PATIENT-LVL IV: CPT | Mod: PBBFAC,,, | Performed by: FAMILY MEDICINE

## 2024-04-16 PROCEDURE — 99214 OFFICE O/P EST MOD 30 MIN: CPT | Mod: S$GLB,,, | Performed by: FAMILY MEDICINE

## 2024-04-16 PROCEDURE — 1159F MED LIST DOCD IN RCRD: CPT | Mod: CPTII,S$GLB,, | Performed by: FAMILY MEDICINE

## 2024-04-16 PROCEDURE — 1160F RVW MEDS BY RX/DR IN RCRD: CPT | Mod: CPTII,S$GLB,, | Performed by: FAMILY MEDICINE

## 2024-04-16 PROCEDURE — 3079F DIAST BP 80-89 MM HG: CPT | Mod: CPTII,S$GLB,, | Performed by: FAMILY MEDICINE

## 2024-04-16 RX ORDER — SEMAGLUTIDE 0.25 MG/.5ML
0.25 INJECTION, SOLUTION SUBCUTANEOUS
Qty: 2 ML | Refills: 0 | Status: SHIPPED | OUTPATIENT
Start: 2024-04-16 | End: 2024-05-14 | Stop reason: CLARIF

## 2024-04-16 NOTE — PATIENT INSTRUCTIONS
Please monitor for any hypoglycemia/low blood sugar.     Try a protein shake, bone broth, gatorade etc  Please let me know if you have any issues or side effects.     Please send me a Tuxebot update soon.    Let me know if any gi side effects and be aware you should not get pregnant while taking wegovy. You should be off this medication x 8 weeks. If you find out you are pregnant stop immediately.

## 2024-04-16 NOTE — PROGRESS NOTES
Subjective     Patient ID: Charlotte Patricia is a 29 y.o. female.    Chief Complaint: follow up    Has tried wellbutrin (on naltrexone but did not take) as well as metfomrin. Stopped metformin due to gi upset.       Regular rheum care.   On plaquenil and benlysta. She feels she is responding well.   She did message rheum about consideration--will need to monitor.  Off steroids.     Previously discussed wegovy.    Hx of prediabetes. Last labs 10/23.    Started wellbutrin. Not on naltrexone.plans to wean off and start zoloft. She sees Yumiko De La Torre ([facility])--also sees Christa.    Motivation increased but pt felt like was too much/overactivated. She felt more hyperfocused. Also affected sleep.     Has birth control pill without interruption. She is compliant. Monogamous relationship.     Hx of lap vineet. No hx of pancreatitis.     Saw hepatology; fatty liver but no fibrosis or cirrhosis.     Tends to have more diarrhea    HPI       Objective     PAST MEDICAL HISTORY:  Past Medical History:   Diagnosis Date    Acne     Depression     Systemic lupus erythematosus, organ or system involvement unspecified          PAST SURGICAL HISTORY:  Past Surgical History:   Procedure Laterality Date    NOSE SURGERY      ROBOT-ASSISTED CHOLECYSTECTOMY USING DA NICK XI N/A 1/7/2022    Procedure: XI ROBOTIC CHOLECYSTECTOMY;  Surgeon: Ernesto Plaza MD;  Location: [facility];  Service: General;  Laterality: N/A;       FAMILY HISTORY:  Family History   Problem Relation Name Age of Onset    Hyperlipidemia Mother      Hypertension Father      Heart disease Maternal Aunt      Heart disease Maternal Uncle      Cancer Paternal Uncle          bone    Heart disease Maternal Grandmother      Hypertension Maternal Grandfather      Cancer Maternal Grandfather      Colon cancer Paternal Grandfather            SOCIAL HISTORY:  Social History     Social History Narrative    ** Merged History Encounter **            MEDICATIONS:  Medications have  been reviewed.    ALLERGIES:  Allergies have been reviewed.    Vitals:    04/16/24 1501   BP: 122/84   Pulse: 94   Resp: 18   Temp: 97.2 °F (36.2 °C)     Wt Readings from Last 10 Encounters:   04/16/24 99.2 kg (218 lb 11.1 oz)   03/11/24 100.3 kg (221 lb 1.9 oz)   03/07/24 100 kg (220 lb 7.4 oz)   11/10/23 99.7 kg (219 lb 12.8 oz)   11/03/23 (P) 99.6 kg (219 lb 9.3 oz)   10/03/23 99.1 kg (218 lb 7.6 oz)   09/19/23 99.7 kg (219 lb 14.5 oz)   07/24/23 100.3 kg (221 lb 0.2 oz)   07/21/23 99.5 kg (219 lb 5.7 oz)   07/10/23 98.9 kg (218 lb 0.6 oz)       Lab Results   Component Value Date    WBC 6.74 03/01/2024    HGB 14.2 03/01/2024    HCT 42.7 03/01/2024     03/01/2024    CHOL 199 08/23/2023    TRIG 149 08/23/2023    HDL 47 08/23/2023    ALT 69 (H) 03/01/2024    AST 38 03/01/2024     03/01/2024    K 4.1 03/01/2024     03/01/2024    CREATININE 0.9 03/01/2024    BUN 12 03/01/2024    CO2 22 (L) 03/01/2024    TSH 2.244 06/13/2023    INR 0.9 06/27/2023    HGBA1C 5.8 (H) 10/03/2023       Review of Systems   Constitutional:  Negative for activity change, appetite change, fatigue and fever.   HENT:  Negative for mouth dryness and goiter.    Eyes:  Negative for visual disturbance.   Respiratory:  Negative for apnea, cough, chest tightness and shortness of breath.    Cardiovascular:  Negative for chest pain, palpitations and leg swelling.   Gastrointestinal:  Negative for abdominal pain, constipation, diarrhea, nausea, vomiting and reflux.   Endocrine: Negative for cold intolerance, heat intolerance, polydipsia, polyphagia and polyuria.   Genitourinary:  Negative for frequency and menstrual problem.   Musculoskeletal:  Negative for arthralgias and myalgias.   Integumentary:  Negative for color change and rash.   Neurological:  Negative for dizziness, vertigo, seizures, syncope, speech difficulty, weakness, headaches and memory loss.   Psychiatric/Behavioral:  Negative for self-injury, sleep disturbance and  suicidal ideas. The patient is not nervous/anxious.        Physical Exam  Vitals and nursing note reviewed.   Constitutional:       General: She is not in acute distress.  HENT:      Head: Normocephalic and atraumatic.      Mouth/Throat:      Pharynx: Oropharynx is clear.   Eyes:      General: No scleral icterus.     Pupils: Pupils are equal, round, and reactive to light.   Neck:      Comments: No TM  Cardiovascular:      Rate and Rhythm: Normal rate and regular rhythm.      Pulses: Normal pulses.      Heart sounds: Normal heart sounds. No murmur heard.     No friction rub. No gallop.   Pulmonary:      Effort: Pulmonary effort is normal. No respiratory distress.      Breath sounds: Normal breath sounds. No wheezing, rhonchi or rales.   Abdominal:      General: Bowel sounds are normal. There is no distension.      Palpations: Abdomen is soft.      Tenderness: There is no abdominal tenderness.   Musculoskeletal:         General: No swelling.      Cervical back: Normal range of motion and neck supple. No tenderness.   Lymphadenopathy:      Cervical: No cervical adenopathy.   Skin:     General: Skin is warm.      Findings: No erythema or rash.   Neurological:      Mental Status: She is alert and oriented to person, place, and time.   Psychiatric:         Mood and Affect: Mood normal.         Behavior: Behavior normal.              Assessment and Plan     1. Prediabetes    2. BUSTER positive    3. Lupus arthritis    4. Class 2 severe obesity due to excess calories with serious comorbidity and body mass index (BMI) of 35.0 to 35.9 in adult    5. Fatty liver    6. Anxiety        Prediabetes  Comments:  failed metformin  Orders:  -     semaglutide, weight loss, (WEGOVY) 0.25 mg/0.5 mL PnIj; Inject 0.25 mg into the skin every 7 days.  Dispense: 2 mL; Refill: 0    BUSTER positive  Comments:  keep appt with rheum    Lupus arthritis  Comments:  chronic/stable sees rheum  on plaquqenil and benlysta  has had eye exam    Class 2 severe  obesity due to excess calories with serious comorbidity and body mass index (BMI) of 35.0 to 35.9 in adult  Comments:  cont lifestyle changes  saw nutrition  Orders:  -     semaglutide, weight loss, (WEGOVY) 0.25 mg/0.5 mL PnIj; Inject 0.25 mg into the skin every 7 days.  Dispense: 2 mL; Refill: 0    Fatty liver  Comments:  Saw hepatology; fatty liver but no fibrosis or cirrhosis.    Anxiety  Comments:  [facility]  starting zoloft tapering off wellbutrin        Reviewed with pt to monitor for any hypoglycemia; adequate protein, complex carbs  Pt has none baseline  Extensive dw pt monitor gerd, constipation, gas, gi se's  Pt expressed understanding  Demo done     Risks/benefits/common side effects of medication discussed with patient at length. UTD patient handout given. (mychart msg)  D/W pt at length potential pharmacologic options; she desires consideration of wegovy. Risks/benefits/common side effects of wegovy d/w pt including nausea and pain at injection site; she is aware should not get pregnant while taking and not approved while breastfeeding. NO personal/fam hx of MEN or medullary thyroid cancer. No hx of pancreatitis. Instructed pt how to use with demo pen; pt practiced in office. Pt advised if approved will get pt handout from pharmacy. Pt has no hx of thyroid nodules or biliary disease/gallstones. DW pt with substantial or rapid weight loss gallstones could develop. Also dw pt glp-1 MOA and common side effects.          Also reviewed with pt gastroparesis and potential for mental health changes. Notify provider immediately if any significant side effects or issues.     Pt advised should be off glp1 8 weeks prior to planned attempts at contraception and stop immediately.     Brief review pt naltrexone (ext provider)     Follow up if symptoms worsen or fail to improve.

## 2024-05-14 ENCOUNTER — OFFICE VISIT (OUTPATIENT)
Dept: PRIMARY CARE CLINIC | Facility: CLINIC | Age: 29
End: 2024-05-14
Payer: COMMERCIAL

## 2024-05-14 DIAGNOSIS — E66.01 CLASS 2 SEVERE OBESITY DUE TO EXCESS CALORIES WITH SERIOUS COMORBIDITY AND BODY MASS INDEX (BMI) OF 35.0 TO 35.9 IN ADULT: ICD-10-CM

## 2024-05-14 DIAGNOSIS — R73.03 PREDIABETES: ICD-10-CM

## 2024-05-14 PROCEDURE — 1159F MED LIST DOCD IN RCRD: CPT | Mod: CPTII,95,, | Performed by: FAMILY MEDICINE

## 2024-05-14 PROCEDURE — 1160F RVW MEDS BY RX/DR IN RCRD: CPT | Mod: CPTII,95,, | Performed by: FAMILY MEDICINE

## 2024-05-14 PROCEDURE — 99213 OFFICE O/P EST LOW 20 MIN: CPT | Mod: 95,,, | Performed by: FAMILY MEDICINE

## 2024-05-14 NOTE — PROGRESS NOTES
Subjective     The patient location is: [place]  The chief complaint leading to consultation is: follow up    Visit type: audiovisual    Face to Face time with patient: 12  15 minutes of total time spent on the encounter, which includes face to face time and non-face to face time preparing to see the patient (eg, review of tests), Obtaining and/or reviewing separately obtained history, Documenting clinical information in the electronic or other health record, Independently interpreting results (not separately reported) and communicating results to the patient/family/caregiver, or Care coordination (not separately reported).         Each patient to whom he or she provides medical services by telemedicine is:  (1) informed of the relationship between the physician and patient and the respective role of any other health care provider with respect to management of the patient; and (2) notified that he or she may decline to receive medical services by telemedicine and may withdraw from such care at any time.    Notes:     Patient ID: Charlotte Patricia is a 29 y.o. female.    Chief Complaint: follow up    LOV 4/24  Hx of prediabetes, pos BUSTER    On wellbutrin but stopped. Has been on zoloft last 4 weeks. Did not take naltrexone. She did have some mild insomnia. More energy.     Failed metformin: GI upset--hx of prediabetes    No wegovy coverage.     Regular rheum appts.  On benlysta and plaquenil. No steroids currently. Has joint pain/stiffness. Some fatigue.     HPI       Objective     PAST MEDICAL HISTORY:  Past Medical History:   Diagnosis Date    Acne     Depression     Systemic lupus erythematosus, organ or system involvement unspecified          PAST SURGICAL HISTORY:  Past Surgical History:   Procedure Laterality Date    NOSE SURGERY      ROBOT-ASSISTED CHOLECYSTECTOMY USING DA NICK XI N/A 1/7/2022    Procedure: XI ROBOTIC CHOLECYSTECTOMY;  Surgeon: Ernesto Plaza MD;  Location: [facility];  Service: General;   Laterality: N/A;       FAMILY HISTORY:  Family History   Problem Relation Name Age of Onset    Hyperlipidemia Mother      Hypertension Father      Heart disease Maternal Aunt      Heart disease Maternal Uncle      Cancer Paternal Uncle          bone    Heart disease Maternal Grandmother      Hypertension Maternal Grandfather      Cancer Maternal Grandfather      Colon cancer Paternal Grandfather            SOCIAL HISTORY:  Social History     Social History Narrative    ** Merged History Encounter **            MEDICATIONS:  Medications have been reviewed.    ALLERGIES:  Allergies have been reviewed.    There were no vitals filed for this visit.  Wt Readings from Last 10 Encounters:   04/16/24 99.2 kg (218 lb 11.1 oz)   03/11/24 100.3 kg (221 lb 1.9 oz)   03/07/24 100 kg (220 lb 7.4 oz)   11/10/23 99.7 kg (219 lb 12.8 oz)   11/03/23 (P) 99.6 kg (219 lb 9.3 oz)   10/03/23 99.1 kg (218 lb 7.6 oz)   09/19/23 99.7 kg (219 lb 14.5 oz)   07/24/23 100.3 kg (221 lb 0.2 oz)   07/21/23 99.5 kg (219 lb 5.7 oz)   07/10/23 98.9 kg (218 lb 0.6 oz)       Lab Results   Component Value Date    WBC 6.74 03/01/2024    HGB 14.2 03/01/2024    HCT 42.7 03/01/2024     03/01/2024    CHOL 199 08/23/2023    TRIG 149 08/23/2023    HDL 47 08/23/2023    ALT 69 (H) 03/01/2024    AST 38 03/01/2024     03/01/2024    K 4.1 03/01/2024     03/01/2024    CREATININE 0.9 03/01/2024    BUN 12 03/01/2024    CO2 22 (L) 03/01/2024    TSH 2.244 06/13/2023    INR 0.9 06/27/2023    HGBA1C 5.8 (H) 10/03/2023       Review of Systems   Constitutional:  Negative for activity change, appetite change, fatigue and fever.   HENT:  Negative for mouth dryness and goiter.    Eyes:  Negative for visual disturbance.   Respiratory:  Negative for apnea, cough, chest tightness and shortness of breath.    Cardiovascular:  Negative for chest pain, palpitations and leg swelling.   Gastrointestinal:  Negative for abdominal pain, constipation, diarrhea, nausea,  vomiting and reflux.   Endocrine: Negative for cold intolerance, heat intolerance, polydipsia, polyphagia and polyuria.   Genitourinary:  Negative for frequency and menstrual problem.   Musculoskeletal:  Negative for arthralgias and myalgias.   Integumentary:  Negative for color change and rash.   Neurological:  Negative for dizziness, vertigo, tremors, syncope, weakness, numbness and headaches.   Psychiatric/Behavioral:  Negative for self-injury, sleep disturbance and suicidal ideas. The patient is not nervous/anxious.        Physical Exam  Constitutional:       General: She is not in acute distress.     Appearance: Normal appearance.   HENT:      Head: Normocephalic and atraumatic.   Eyes:      General: No scleral icterus.  Pulmonary:      Effort: Pulmonary effort is normal. No respiratory distress.   Neurological:      Mental Status: She is alert and oriented to person, place, and time.   Psychiatric:         Mood and Affect: Mood normal.         Behavior: Behavior normal.              Assessment and Plan     1. Prediabetes    2. Class 2 severe obesity due to excess calories with serious comorbidity and body mass index (BMI) of 35.0 to 35.9 in adult      Prediabetes    Class 2 severe obesity due to excess calories with serious comorbidity and body mass index (BMI) of 35.0 to 35.9 in adult    Had nutrition visit  Pt will likely consider going again     Pt also with hx of fatty liver--has rheum and advised to see pcp/had one hep visit with fibroscan per pt  Goal 5-10% tbw loss    Failed metformin, wellbutrin   Recent addition of naltrexone but pt has not started  Brief review glp1, she may consider another agent in the future depending on insurance coverage     Pt made aware I will be leaving [facility] in June. She has established pcp so have advised her f/u with them this summer.     Also reviewed potential external community options.  Pt advised to f/u with pcp- she will see how is feeling and plans to f/u with [facility]  here when NP is here in August.    Follow up if symptoms worsen or fail to improve.

## 2024-06-25 ENCOUNTER — PATIENT MESSAGE (OUTPATIENT)
Dept: RHEUMATOLOGY | Facility: CLINIC | Age: 29
End: 2024-06-25
Payer: COMMERCIAL

## 2024-07-08 ENCOUNTER — LAB VISIT (OUTPATIENT)
Dept: LAB | Facility: HOSPITAL | Age: 29
End: 2024-07-08
Attending: STUDENT IN AN ORGANIZED HEALTH CARE EDUCATION/TRAINING PROGRAM
Payer: COMMERCIAL

## 2024-07-08 DIAGNOSIS — R76.8 POSITIVE ANA (ANTINUCLEAR ANTIBODY): ICD-10-CM

## 2024-07-08 DIAGNOSIS — R76.8 DS DNA ANTIBODY POSITIVE: ICD-10-CM

## 2024-07-08 LAB
ALBUMIN SERPL BCP-MCNC: 3.6 G/DL (ref 3.5–5.2)
ALP SERPL-CCNC: 89 U/L (ref 55–135)
ALT SERPL W/O P-5'-P-CCNC: 45 U/L (ref 10–44)
ANION GAP SERPL CALC-SCNC: 8 MMOL/L (ref 8–16)
AST SERPL-CCNC: 27 U/L (ref 10–40)
BASOPHILS # BLD AUTO: 0.1 K/UL (ref 0–0.2)
BASOPHILS NFR BLD: 1.1 % (ref 0–1.9)
BILIRUB SERPL-MCNC: 0.3 MG/DL (ref 0.1–1)
BUN SERPL-MCNC: 14 MG/DL (ref 6–20)
CALCIUM SERPL-MCNC: 9 MG/DL (ref 8.7–10.5)
CHLORIDE SERPL-SCNC: 106 MMOL/L (ref 95–110)
CO2 SERPL-SCNC: 23 MMOL/L (ref 23–29)
CREAT SERPL-MCNC: 0.9 MG/DL (ref 0.5–1.4)
CREAT UR-MCNC: 289.9 MG/DL (ref 15–325)
CRP SERPL-MCNC: 17.2 MG/L (ref 0–8.2)
DIFFERENTIAL METHOD BLD: NORMAL
EOSINOPHIL # BLD AUTO: 0.1 K/UL (ref 0–0.5)
EOSINOPHIL NFR BLD: 0.9 % (ref 0–8)
ERYTHROCYTE [DISTWIDTH] IN BLOOD BY AUTOMATED COUNT: 12.8 % (ref 11.5–14.5)
ERYTHROCYTE [SEDIMENTATION RATE] IN BLOOD BY WESTERGREN METHOD: 23 MM/HR (ref 0–20)
EST. GFR  (NO RACE VARIABLE): >60 ML/MIN/1.73 M^2
GLUCOSE SERPL-MCNC: 92 MG/DL (ref 70–110)
HCT VFR BLD AUTO: 40.6 % (ref 37–48.5)
HGB BLD-MCNC: 13.7 G/DL (ref 12–16)
IMM GRANULOCYTES # BLD AUTO: 0.03 K/UL (ref 0–0.04)
IMM GRANULOCYTES NFR BLD AUTO: 0.3 % (ref 0–0.5)
LYMPHOCYTES # BLD AUTO: 2.8 K/UL (ref 1–4.8)
LYMPHOCYTES NFR BLD: 31.8 % (ref 18–48)
MCH RBC QN AUTO: 30 PG (ref 27–31)
MCHC RBC AUTO-ENTMCNC: 33.7 G/DL (ref 32–36)
MCV RBC AUTO: 89 FL (ref 82–98)
MICROSCOPIC COMMENT: NORMAL
MONOCYTES # BLD AUTO: 0.5 K/UL (ref 0.3–1)
MONOCYTES NFR BLD: 5.9 % (ref 4–15)
NEUTROPHILS # BLD AUTO: 5.3 K/UL (ref 1.8–7.7)
NEUTROPHILS NFR BLD: 60 % (ref 38–73)
NRBC BLD-RTO: 0 /100 WBC
PLATELET # BLD AUTO: 302 K/UL (ref 150–450)
PMV BLD AUTO: 11.3 FL (ref 9.2–12.9)
POTASSIUM SERPL-SCNC: 3.8 MMOL/L (ref 3.5–5.1)
PROT SERPL-MCNC: 7.7 G/DL (ref 6–8.4)
PROT UR-MCNC: 14 MG/DL (ref 0–15)
PROT/CREAT UR: 0.05 MG/G{CREAT} (ref 0–0.2)
RBC # BLD AUTO: 4.57 M/UL (ref 4–5.4)
SODIUM SERPL-SCNC: 137 MMOL/L (ref 136–145)
SQUAMOUS #/AREA URNS HPF: 2 /HPF
WBC # BLD AUTO: 8.83 K/UL (ref 3.9–12.7)
WBC #/AREA URNS HPF: 2 /HPF (ref 0–5)

## 2024-07-08 PROCEDURE — 86160 COMPLEMENT ANTIGEN: CPT | Mod: 59 | Performed by: PHYSICIAN ASSISTANT

## 2024-07-08 PROCEDURE — 86225 DNA ANTIBODY NATIVE: CPT | Performed by: PHYSICIAN ASSISTANT

## 2024-07-08 PROCEDURE — 86225 DNA ANTIBODY NATIVE: CPT | Mod: 59 | Performed by: PHYSICIAN ASSISTANT

## 2024-07-08 PROCEDURE — 36415 COLL VENOUS BLD VENIPUNCTURE: CPT | Mod: PO | Performed by: PHYSICIAN ASSISTANT

## 2024-07-08 PROCEDURE — 81000 URINALYSIS NONAUTO W/SCOPE: CPT | Performed by: PHYSICIAN ASSISTANT

## 2024-07-08 PROCEDURE — 80053 COMPREHEN METABOLIC PANEL: CPT | Performed by: PHYSICIAN ASSISTANT

## 2024-07-08 PROCEDURE — 84156 ASSAY OF PROTEIN URINE: CPT | Performed by: PHYSICIAN ASSISTANT

## 2024-07-08 PROCEDURE — 86140 C-REACTIVE PROTEIN: CPT | Performed by: PHYSICIAN ASSISTANT

## 2024-07-08 PROCEDURE — 86160 COMPLEMENT ANTIGEN: CPT | Performed by: PHYSICIAN ASSISTANT

## 2024-07-08 PROCEDURE — 85651 RBC SED RATE NONAUTOMATED: CPT | Performed by: PHYSICIAN ASSISTANT

## 2024-07-08 PROCEDURE — 85025 COMPLETE CBC W/AUTO DIFF WBC: CPT | Performed by: PHYSICIAN ASSISTANT

## 2024-07-09 ENCOUNTER — PATIENT MESSAGE (OUTPATIENT)
Dept: RESEARCH | Facility: HOSPITAL | Age: 29
End: 2024-07-09
Payer: COMMERCIAL

## 2024-07-09 LAB
C3 SERPL-MCNC: 186 MG/DL (ref 50–180)
C4 SERPL-MCNC: 45 MG/DL (ref 11–44)

## 2024-07-10 LAB
DNA TITER: NORMAL
DSDNA AB SER-ACNC: POSITIVE [IU]/ML

## 2024-07-15 ENCOUNTER — OFFICE VISIT (OUTPATIENT)
Dept: RHEUMATOLOGY | Facility: CLINIC | Age: 29
End: 2024-07-15
Payer: COMMERCIAL

## 2024-07-15 VITALS
WEIGHT: 214.5 LBS | SYSTOLIC BLOOD PRESSURE: 127 MMHG | BODY MASS INDEX: 34.47 KG/M2 | HEIGHT: 66 IN | DIASTOLIC BLOOD PRESSURE: 78 MMHG | HEART RATE: 104 BPM

## 2024-07-15 DIAGNOSIS — R76.8 DS DNA ANTIBODY POSITIVE: ICD-10-CM

## 2024-07-15 DIAGNOSIS — D84.821 ENCOUNTER FOR MONITORING IMMUNOSUPPRESSIVE MEDICATION THERAPY CAUSING IMMUNODEFICIENCY: ICD-10-CM

## 2024-07-15 DIAGNOSIS — M32.19 OTHER SYSTEMIC LUPUS ERYTHEMATOSUS WITH OTHER ORGAN INVOLVEMENT: Primary | ICD-10-CM

## 2024-07-15 DIAGNOSIS — Z34.90 PLANNED PREGNANCY: ICD-10-CM

## 2024-07-15 DIAGNOSIS — Z79.60 ENCOUNTER FOR MONITORING IMMUNOSUPPRESSIVE MEDICATION THERAPY CAUSING IMMUNODEFICIENCY: ICD-10-CM

## 2024-07-15 DIAGNOSIS — M35.00 SICCA SYNDROME: ICD-10-CM

## 2024-07-15 DIAGNOSIS — D84.821 IMMUNODEFICIENCY DUE TO DRUG THERAPY: ICD-10-CM

## 2024-07-15 DIAGNOSIS — R53.83 FATIGUE, UNSPECIFIED TYPE: ICD-10-CM

## 2024-07-15 DIAGNOSIS — Z79.899 IMMUNODEFICIENCY DUE TO DRUG THERAPY: ICD-10-CM

## 2024-07-15 DIAGNOSIS — M32.9 LUPUS ARTHRITIS: ICD-10-CM

## 2024-07-15 DIAGNOSIS — Z51.81 ENCOUNTER FOR MONITORING IMMUNOSUPPRESSIVE MEDICATION THERAPY CAUSING IMMUNODEFICIENCY: ICD-10-CM

## 2024-07-15 PROCEDURE — 99999 PR PBB SHADOW E&M-EST. PATIENT-LVL IV: CPT | Mod: PBBFAC,,, | Performed by: STUDENT IN AN ORGANIZED HEALTH CARE EDUCATION/TRAINING PROGRAM

## 2024-07-15 PROCEDURE — 1159F MED LIST DOCD IN RCRD: CPT | Mod: CPTII,S$GLB,, | Performed by: STUDENT IN AN ORGANIZED HEALTH CARE EDUCATION/TRAINING PROGRAM

## 2024-07-15 PROCEDURE — 99215 OFFICE O/P EST HI 40 MIN: CPT | Mod: S$GLB,,, | Performed by: STUDENT IN AN ORGANIZED HEALTH CARE EDUCATION/TRAINING PROGRAM

## 2024-07-15 PROCEDURE — 3074F SYST BP LT 130 MM HG: CPT | Mod: CPTII,S$GLB,, | Performed by: STUDENT IN AN ORGANIZED HEALTH CARE EDUCATION/TRAINING PROGRAM

## 2024-07-15 PROCEDURE — 3078F DIAST BP <80 MM HG: CPT | Mod: CPTII,S$GLB,, | Performed by: STUDENT IN AN ORGANIZED HEALTH CARE EDUCATION/TRAINING PROGRAM

## 2024-07-15 PROCEDURE — 3008F BODY MASS INDEX DOCD: CPT | Mod: CPTII,S$GLB,, | Performed by: STUDENT IN AN ORGANIZED HEALTH CARE EDUCATION/TRAINING PROGRAM

## 2024-07-15 RX ORDER — HYDROXYCHLOROQUINE SULFATE 200 MG/1
200 TABLET, FILM COATED ORAL 2 TIMES DAILY
Qty: 60 TABLET | Refills: 3 | Status: SHIPPED | OUTPATIENT
Start: 2024-07-15

## 2024-07-15 RX ORDER — BUSPIRONE HYDROCHLORIDE 7.5 MG/1
7.5 TABLET ORAL
COMMUNITY
Start: 2024-07-09

## 2024-07-15 RX ORDER — HYDROXYCHLOROQUINE SULFATE 200 MG/1
TABLET, FILM COATED ORAL
COMMUNITY
End: 2024-07-15 | Stop reason: SDUPTHER

## 2024-07-15 NOTE — PROGRESS NOTES
Subjective:      Patient ID: Charlotte Patricia is a 29 y.o. female.    Chief Complaint: SLE follow up    HPI:   Patient presents for Rheumatology follow up for lupus with positive BUSTER, dsDNA, and joint pain and stiffness. She is on Plaquenil 200 mg BID and Benlysta  mg weekly. BUSTER initially found positive in 06/2023 during workup of elevated ALT and AST. 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 No autoimmune hepatitis.     Gets photosensitive rashes on the cheeks and forehead with sun exposure, lasts a few days.  Morning stiffness 30 minutes to an hour. Also gets joint stiffness after a period of rest. Stays mostly sedentary because she works from home.  +dry eyes, +dry mouth  +acid reflux  Hands, feet, legs go numb and tingle constantly.  Gets itchy all over.  Sometimes teeth or gums hurt. +TMJ pain. Sees Dentist and got a new mouth guard.  Deals with depression and anxiety which is worse in the past year. Therefore not getting much exercise.    No telangiectasias   No calcinosis   No psoriasis   No patchy alopecia   No oral or nasal ulcers   No pleurisy, chest pain, dyspnea, cough  No dysphagia, diplopia, dysphonia  No muscle weakness   No nausea, vomiting, diarrhea, constipation   No Raynaud's  No digital ulcers   No cytopenias   No renal issues   No blood clots   No fever, chills, night sweats, weight loss, or loss of appetite   No pregnancy losses, pre-term deliveries, or pregnancy complications   No new onset headaches   No recurrent conjunctivitis, uveitis, scleritis, or episcleritis   No chronic or bloody diarrhea. No Ulcerative Colitis or Crohn's (IBD)  No vaginal or penile and urethral discharge/STDs/ulcers   No unexplained lymphadenopathy  No parotitis   No seizures, strokes, psychosis  No sclerodactyly  No puffy hands  No perioral tightness     Social Hx: Never smoker. Rare alcohol use.  Family Hx: Mother has fibromyalgia. No family history of autoimmune disease      Objective:   /78   Pulse  "104   Ht 5' 6" (1.676 m)   Wt 97.3 kg (214 lb 8.1 oz)   LMP 06/01/2024 (Approximate)   BMI 34.62 kg/m²   Physical Exam  Constitutional:       General: She is not in acute distress.     Appearance: Normal appearance.   HENT:      Head: Normocephalic and atraumatic.      Mouth/Throat:      Mouth: Mucous membranes are moist.      Pharynx: Oropharynx is clear.   Cardiovascular:      Rate and Rhythm: Normal rate and regular rhythm.   Pulmonary:      Effort: Pulmonary effort is normal.      Breath sounds: Normal breath sounds.   Abdominal:      Palpations: Abdomen is soft.      Tenderness: There is no abdominal tenderness.   Musculoskeletal:         General: No swelling or tenderness.      Cervical back: Normal range of motion. No tenderness.   Skin:     General: Skin is warm and dry.   Neurological:      Mental Status: She is alert and oriented to person, place, and time. Mental status is at baseline.             Assessment and Plan:     Problem List Items Addressed This Visit          Unprioritized    Lupus arthritis    Fatigue     Other Visit Diagnoses       Other systemic lupus erythematosus with other organ involvement    -  Primary    Relevant Orders    Beta-2 Glycoprotein Abs (IgA, IgG, IgM)    Cardiolipin antibody    DRVVT    Anti-DNA Ab, Double-Stranded    C3 Complement    C4 Complement    CBC Auto Differential    Comprehensive Metabolic Panel    C-Reactive Protein    Protein/Creatinine Ratio, Urine    Sedimentation rate    Urinalysis    Immunodeficiency due to drug therapy        Relevant Orders    Beta-2 Glycoprotein Abs (IgA, IgG, IgM)    Cardiolipin antibody    DRVVT    Anti-DNA Ab, Double-Stranded    C3 Complement    C4 Complement    CBC Auto Differential    Comprehensive Metabolic Panel    C-Reactive Protein    Protein/Creatinine Ratio, Urine    Sedimentation rate    Urinalysis    Encounter for monitoring immunosuppressive medication therapy causing immunodeficiency        Relevant Orders    Beta-2 " Glycoprotein Abs (IgA, IgG, IgM)    Cardiolipin antibody    DRVVT    Anti-DNA Ab, Double-Stranded    C3 Complement    C4 Complement    CBC Auto Differential    Comprehensive Metabolic Panel    C-Reactive Protein    Protein/Creatinine Ratio, Urine    Sedimentation rate    Urinalysis    Sicca syndrome        Ds DNA antibody positive        Planned pregnancy                Patient presents for Rheumatology follow up for lupus.    SLE  BUSTER+ >1:2560 homogenous and >1:2560 speckled.  dsDNA+ >1:5120  SSB+    Manifestations are photosensitive rash, sicca symptoms, joint pain, and stiffness. Reviewed labs from last week. Inflammatory markers a little better. dsDNA titer rising and this might be due to a lot of sun exposure in the past 2 months. Normal CBC. CMP shows improving ALT and otherwise normal. UPCR is negative.    Overall stable lupus. Inflammation has improved with Benlysta. Still has some arthralgias and fatigue. Possible early fibromyalgia. Depression and deconditioning are playing a role. She also wants to get pregnant in about 2 years. Since her arthralgias haven't improved that much, she will try muscular conditioning and depression treatment, and if no improvement in symptoms by next visit consider switching to azathioprine. Might need to add on gabapentin or naltrexone to help. Defer for now.      Plan:  Start vitamin D supplements up to 1,000 units daily to help with fatigue and joint pain.  Take Arthritis strength extended release Tylenol 650 mg 1 tablet 2 times daily as needed for pain.  Continue Benlysta injections 200 mg weekly.  Continue Plaquenil 200 mg twice a day.  Will get Plaquenil 6 monthly eye exam in August 2024.  Labs in 3 months.        High Risk Medication Monitoring encounter  Drug therapy requiring intensive monitoring for toxicity  No current medication related issues, no evidence of toxicity  Appropriate labs ordered for toxicity monitoring    Compromised immune system secondary to  autoimmune disease and/or use of immunosuppressive drugs.  Monitor carefully for infections.  Advised patient to get immediate medical care if any infection arises.  Also advised strict adherence age-appropriate vaccinations and cancer screenings with PCP.    Patient advised to hold DMARD and/or biologic therapy for signs of infection or for surgery. If you are unsure what to do please call our office for instruction.[facility] [phone]          Follow up in about 3 months (around 10/15/2024).       I spent a total of 51 minutes on the day of the visit.  This includes face to face time and non-face to face time preparing to see the patient (eg, review of tests), obtaining and/or reviewing separately obtained history, documenting clinical information in the electronic or other health record, independently interpreting results and communicating results to the patient/family/caregiver, or care coordinator.

## 2024-07-15 NOTE — PATIENT INSTRUCTIONS
Start vitamin D supplements up to 1,000 units daily to help with fatigue and joint pain.  Take arthritis strength extended release Tylenol 650 mg 1 tablet 2 times daily as needed for pain.  Continue Benlysta injections 200 mg weekly.  Continue Plaquenil 200 mg twice a day.  Get Plaquenil 6 monthly eye exam in August 2024.  Labs in 3 months.

## 2024-08-08 ENCOUNTER — PATIENT MESSAGE (OUTPATIENT)
Dept: RHEUMATOLOGY | Facility: CLINIC | Age: 29
End: 2024-08-08
Payer: COMMERCIAL

## 2024-08-08 DIAGNOSIS — R21 RASH: Primary | ICD-10-CM

## 2024-08-12 ENCOUNTER — LAB VISIT (OUTPATIENT)
Dept: LAB | Facility: HOSPITAL | Age: 29
End: 2024-08-12
Attending: FAMILY MEDICINE
Payer: COMMERCIAL

## 2024-08-12 ENCOUNTER — OFFICE VISIT (OUTPATIENT)
Dept: PRIMARY CARE CLINIC | Facility: CLINIC | Age: 29
End: 2024-08-12
Payer: COMMERCIAL

## 2024-08-12 VITALS
HEIGHT: 66 IN | RESPIRATION RATE: 18 BRPM | BODY MASS INDEX: 35.13 KG/M2 | DIASTOLIC BLOOD PRESSURE: 76 MMHG | HEART RATE: 91 BPM | TEMPERATURE: 97 F | WEIGHT: 218.56 LBS | OXYGEN SATURATION: 97 % | SYSTOLIC BLOOD PRESSURE: 120 MMHG

## 2024-08-12 DIAGNOSIS — R73.03 PREDIABETES: ICD-10-CM

## 2024-08-12 DIAGNOSIS — L30.9 DERMATITIS: ICD-10-CM

## 2024-08-12 DIAGNOSIS — K76.0 FATTY LIVER: ICD-10-CM

## 2024-08-12 DIAGNOSIS — R76.8 ANA POSITIVE: ICD-10-CM

## 2024-08-12 DIAGNOSIS — R21 RASH: ICD-10-CM

## 2024-08-12 DIAGNOSIS — Z00.00 ROUTINE GENERAL MEDICAL EXAMINATION AT A HEALTH CARE FACILITY: ICD-10-CM

## 2024-08-12 DIAGNOSIS — E88.819 INSULIN RESISTANCE: ICD-10-CM

## 2024-08-12 DIAGNOSIS — Z00.00 ROUTINE GENERAL MEDICAL EXAMINATION AT A HEALTH CARE FACILITY: Primary | ICD-10-CM

## 2024-08-12 DIAGNOSIS — M32.9 LUPUS ARTHRITIS: ICD-10-CM

## 2024-08-12 LAB
CHOLEST SERPL-MCNC: 178 MG/DL (ref 120–199)
CHOLEST/HDLC SERPL: 4 {RATIO} (ref 2–5)
ESTIMATED AVG GLUCOSE: 111 MG/DL (ref 68–131)
HBA1C MFR BLD: 5.5 % (ref 4–5.6)
HDLC SERPL-MCNC: 45 MG/DL (ref 40–75)
HDLC SERPL: 25.3 % (ref 20–50)
LDLC SERPL CALC-MCNC: 104 MG/DL (ref 63–159)
NONHDLC SERPL-MCNC: 133 MG/DL
T4 FREE SERPL-MCNC: 0.9 NG/DL (ref 0.71–1.51)
TRIGL SERPL-MCNC: 145 MG/DL (ref 30–150)
TSH SERPL DL<=0.005 MIU/L-ACNC: 0.94 UIU/ML (ref 0.4–4)

## 2024-08-12 PROCEDURE — 99395 PREV VISIT EST AGE 18-39: CPT | Mod: S$GLB,,, | Performed by: FAMILY MEDICINE

## 2024-08-12 PROCEDURE — 84439 ASSAY OF FREE THYROXINE: CPT | Performed by: FAMILY MEDICINE

## 2024-08-12 PROCEDURE — 3074F SYST BP LT 130 MM HG: CPT | Mod: CPTII,S$GLB,, | Performed by: FAMILY MEDICINE

## 2024-08-12 PROCEDURE — 80061 LIPID PANEL: CPT | Performed by: FAMILY MEDICINE

## 2024-08-12 PROCEDURE — 3078F DIAST BP <80 MM HG: CPT | Mod: CPTII,S$GLB,, | Performed by: FAMILY MEDICINE

## 2024-08-12 PROCEDURE — 83036 HEMOGLOBIN GLYCOSYLATED A1C: CPT | Performed by: FAMILY MEDICINE

## 2024-08-12 PROCEDURE — 36415 COLL VENOUS BLD VENIPUNCTURE: CPT | Mod: PN | Performed by: FAMILY MEDICINE

## 2024-08-12 PROCEDURE — 3008F BODY MASS INDEX DOCD: CPT | Mod: CPTII,S$GLB,, | Performed by: FAMILY MEDICINE

## 2024-08-12 PROCEDURE — 1159F MED LIST DOCD IN RCRD: CPT | Mod: CPTII,S$GLB,, | Performed by: FAMILY MEDICINE

## 2024-08-12 PROCEDURE — 1160F RVW MEDS BY RX/DR IN RCRD: CPT | Mod: CPTII,S$GLB,, | Performed by: FAMILY MEDICINE

## 2024-08-12 PROCEDURE — 84443 ASSAY THYROID STIM HORMONE: CPT | Performed by: FAMILY MEDICINE

## 2024-08-12 PROCEDURE — 99999 PR PBB SHADOW E&M-EST. PATIENT-LVL III: CPT | Mod: PBBFAC,,, | Performed by: FAMILY MEDICINE

## 2024-08-12 RX ORDER — DESVENLAFAXINE SUCCINATE 25 MG/1
1 TABLET, EXTENDED RELEASE ORAL NIGHTLY
COMMUNITY
Start: 2024-08-05

## 2024-08-12 RX ORDER — BUSPIRONE HYDROCHLORIDE 10 MG/1
10 TABLET ORAL 2 TIMES DAILY
COMMUNITY
Start: 2024-08-05

## 2024-08-12 NOTE — PROGRESS NOTES
Chief Complaint  Chief Complaint   Patient presents with    Annual Exam       HPI  Charlotte Patricia is a 29 y.o. female with multiple medical diagnoses as listed in the medical history and problem list that presents for  in person visit.      The patient's last visit with me was on 9/19/2023.     History of Present Illness    CHIEF COMPLAINT:  The patient presents for an annual exam and to discuss recent lupus-related symptoms, including rashes and GI issues.    HPI:  Patient has been diagnosed with lupus and is currently followed by Dr. Mott in rheumatology. She is on Benlysta injections 200 mg weekly and Plaquenil 200 mg twice daily for lupus management. Recently, she has had rashes on her face, initially attributed to photosensitivity due to lupus. The rash also appears after exercise, particularly on her forehead, suggesting it might be heat-related or possibly rosacea. The rash occurs when she is outside in the sun and is intermittent. She has not had the classic butterfly rash associated with lupus.    The patient has significant GI problems. She has loose stools, particularly from morning until noon, with frequent bathroom visits. After lunch, she has an almost immediate need to use the bathroom. These issues have worsened since starting her current medications. In the past 2 weeks, she has had a combination of diarrhea and constipation, along with a burning sensation during bowel movements. The patient is concerned about these symptoms, especially in social and work situations where bathroom access may be limited.    The patient has been adjusting her antidepressant medications. She has tried Wellbutrin and Zoloft, and is currently discontinuing Zoloft and starting Pristiq. Some of her GI symptoms may be related to these medication changes.    The patient has a history of gallbladder removal, which she believes contributes to her ongoing digestive issues. She sees Dr. Yumiko DeL a Torre at [facility]  for psychiatry and therapy management.    MEDICATIONS:  Benlysta 200 mg, weekly injections, for lupus.  Plaquenil 200 mg, twice daily, for lupus.  BuSpar 5 mg, in the morning.  Pristiq, recently started, for depression/anxiety.    PMH:  Lupus.  Pre-diabetes (past)  Rosacea (suspected) Contraception: Considering options compatible with pregnancy.  Pregnancy status: Denies current pregnancy, considering future pregnancy.    RECENT/REMOTE SURGICAL HISTORY:  Gallbladder removal.         Ohs Peq Sdoh    5/14/2024 12:37 PM CDT - Filed by Patient   This questionnaire should take approximately 5 to 10 minutes to complete.  To begin, press Let's Begin and then press Continue. Let's Begin   On average, how many days per week do you engage in moderate to strenuous exercise (like a brisk walk)? 2 days   On average, how many minutes do you engage in exercise at this level? 30 min   Do you feel stress - tense, restless, nervous, or anxious, or unable to sleep at night because your mind is troubled all the time - these days? To some extent   How hard is it for you to pay for the very basics like food, housing, medical care, and heating? Not hard at all   Within the past 12 months, you worried that your food would run out before you got the money to buy more. Never true   Within the past 12 months, the food you bought just didn't last and you didn't have money to get more. Never true   In the past 12 months, has lack of transportation kept you from medical appointments or from getting medications? No   In the past 12 months, has lack of transportation kept you from meetings, work, or from getting things needed for daily living? No   How often do you have a drink containing alcohol? Monthly or less   How many drinks containing alcohol do you have on a typical day when you are drinking? 1 or 2   How often do you have six or more drinks on one occasion? Never   In the last 12 months, was there a time when you were not able to pay the  "mortgage or rent on time? No   In the last 12 months, how many places have you lived? (range: at least 0)    In the last 12 months, was there a time when you did not have a steady place to sleep or slept in a shelter (including now)? No            Pmh, Psh, Family Hx, Social Hx, HM updated in Epic Tabs today.    Review of Systems   Constitutional:  Negative for activity change, appetite change and fatigue.   Respiratory:  Negative for cough and shortness of breath.    Cardiovascular:  Negative for chest pain and palpitations.   Gastrointestinal:  Positive for diarrhea. Negative for abdominal distention and abdominal pain.   Musculoskeletal:  Positive for myalgias.   Skin:  Positive for rash.   Psychiatric/Behavioral:  Positive for dysphoric mood. Negative for sleep disturbance. The patient is nervous/anxious.         Objective:     Vitals:    08/12/24 0854   BP: 120/76   BP Location: Right arm   Patient Position: Sitting   BP Method: Large (Manual)   Pulse: 91   Resp: 18   Temp: 97.1 °F (36.2 °C)   TempSrc: Tympanic   SpO2: 97%   Weight: 99.2 kg (218 lb 9.4 oz)   Height: 5' 6" (1.676 m)     Wt Readings from Last 10 Encounters:   08/12/24 99.2 kg (218 lb 9.4 oz)   07/15/24 97.3 kg (214 lb 8.1 oz)   06/04/24 97.1 kg (214 lb)   04/16/24 99.2 kg (218 lb 11.1 oz)   03/11/24 100.3 kg (221 lb 1.9 oz)   03/07/24 100 kg (220 lb 7.4 oz)   11/10/23 99.7 kg (219 lb 12.8 oz)   11/03/23 (P) 99.6 kg (219 lb 9.3 oz)   10/03/23 99.1 kg (218 lb 7.6 oz)   09/19/23 99.7 kg (219 lb 14.5 oz)     Physical Exam    TEST RESULTS:  Sed rate: Previously 32, then 24, now 23  Electrolytes: Normal.  Kidney function: Normal.  Blood sugar: 92, normal.  Liver enzymes: ALT 45 (trending down, almost normal)  CRP: Previously 22, then 20, now 17 (steadily decreasing)  Double-stranded DNA: Positive, titer increased.  Complement levels: Similar to previous, slightly lower than 6 months ago.       Physical Exam  Vitals reviewed.   Constitutional:       " Appearance: Normal appearance. She is well-developed. She is obese.   HENT:      Head: Normocephalic and atraumatic.      Right Ear: Tympanic membrane and external ear normal.      Left Ear: Tympanic membrane and external ear normal.      Nose: Nose normal.      Mouth/Throat:      Mouth: Mucous membranes are moist.      Pharynx: Oropharynx is clear.   Eyes:      Conjunctiva/sclera: Conjunctivae normal.      Pupils: Pupils are equal, round, and reactive to light.   Neck:      Thyroid: No thyromegaly.   Cardiovascular:      Rate and Rhythm: Normal rate and regular rhythm.      Heart sounds: Normal heart sounds. No murmur heard.     No friction rub. No gallop.   Pulmonary:      Effort: Pulmonary effort is normal. No respiratory distress.      Breath sounds: Normal breath sounds. No wheezing or rales.   Abdominal:      General: Bowel sounds are normal. There is no distension.      Palpations: Abdomen is soft.      Tenderness: There is no abdominal tenderness. There is no rebound.   Musculoskeletal:         General: Normal range of motion.      Cervical back: Normal range of motion and neck supple.   Lymphadenopathy:      Cervical: No cervical adenopathy.   Skin:     General: Skin is warm and dry.      Findings: No rash.   Neurological:      Mental Status: She is alert and oriented to person, place, and time.   Psychiatric:         Attention and Perception: Attention and perception normal.         Mood and Affect: Affect normal. Mood is anxious.         Speech: Speech normal.         Behavior: Behavior normal.         Thought Content: Thought content normal.         Cognition and Memory: Cognition and memory normal.         Judgment: Judgment normal.         Assessment:     1. Routine general medical examination at a health care facility    2. Lupus arthritis    3. BUSTER positive    4. Fatty liver    5. Dermatitis    6. Rash    7. Insulin resistance    8. Prediabetes        LABS:   Lab Results   Component Value Date     HGBA1C 5.8 (H) 10/03/2023    HGBA1C 5.6 06/13/2023      Lab Results   Component Value Date    CHOL 199 08/23/2023    CHOL 230 (H) 06/13/2023     Lab Results   Component Value Date    LDLCALC 122.2 08/23/2023    LDLCALC 147.6 06/13/2023     Lab Results   Component Value Date    WBC 8.83 07/08/2024    HGB 13.7 07/08/2024    HCT 40.6 07/08/2024     07/08/2024    CHOL 199 08/23/2023    TRIG 149 08/23/2023    HDL 47 08/23/2023    ALT 45 (H) 07/08/2024    AST 27 07/08/2024     07/08/2024    K 3.8 07/08/2024     07/08/2024    CREATININE 0.9 07/08/2024    BUN 14 07/08/2024    CO2 23 07/08/2024    TSH 2.244 06/13/2023    INR 0.9 06/27/2023    HGBA1C 5.8 (H) 10/03/2023       Plan:   Charlotte was seen today for annual exam.    Diagnoses and all orders for this visit:    Routine general medical examination at a health care facility  -     TSH; Future  -     T4, Free; Future  -     Lipid Panel; Future  -     Hemoglobin A1C; Future    Lupus arthritis    BUSTER positive    Fatty liver  -     TSH; Future  -     T4, Free; Future    Dermatitis    Rash    Insulin resistance  -     TSH; Future  -     T4, Free; Future  -     Lipid Panel; Future  -     Hemoglobin A1C; Future    Prediabetes  -     TSH; Future  -     T4, Free; Future  -     Lipid Panel; Future  -     Hemoglobin A1C; Future        Assessment & Plan    LUPUS:  - Assessed lupus management, noting recent labs and decreasing inflammatory markers (sed rate, CRP).  - Continued Benlysta 200 mg weekly injections.  - Continued Plaquenil 200 mg twice daily.  SKIN RASH:  - Evaluated skin rash, considering lupus-related photosensitivity vs. rosacea; deferred to dermatology for definitive diagnosis.  BOWEL ISSUES:  - Reviewed recent bowel issues, likely related to medication changes (Zoloft, Pristiq transition).  - Will monitor bowel symptoms during antidepressant transition before considering additional interventions.  - Considered potential need for gallbladder-related  medication if symptoms persist after medication changes.  - Explained that Zoloft can cause diarrhea as a side effect.  - Discussed potential for bowel symptoms to improve after transitioning from Zoloft to Pristiq.  - Patient to continue current diet and monitor for any changes in bowel symptoms.  - Patient to maintain adequate fiber intake and hydration if constipation occurs during medication transition.  MEDICATIONS/SUPPLEMENTS:  - Continued BuSpar 5 mg in the morning.  - Continued weaning off Zoloft.  - Continued transition to Pristiq.  LABS:  - Ordered fasting labs: thyroid panel, A1C, and cholesterol.  FOLLOW UP:  - Follow up in 6 months for routine check.  - Contact the office in 2-3 weeks if bowel symptoms do not improve after transitioning medications.  - Message the doctor in 2-3 weeks with an update on bowel symptoms.         X-Ray Chest PA And Lateral  Narrative: EXAM:  XR CHEST PA AND LATERAL    CLINICAL HISTORY:   Shortness of breath.    2 views of the chest.    FINDINGS: The lungs are clear.  The cardiac silhouette and mediastinum are within normal limits.  Osseous structures and soft tissues are within normal limits.  Impression:   No acute chest findings.    Finalized on: 7/10/2023 1:05 PM By:  Dale Salmon MD  [facility]# [id number]      2023-07-10 13:07:[id number]    BRELIJAH    The ASCVD Risk score (Rafaela DK, et al., 2019) failed to calculate for the following reasons:    The 2019 ASCVD risk score is only valid for ages 40 to 79    Follow-up: Follow up in about 6 months (around 2/12/2025) for f/u OV Dr. Reynolds 6mo f/u .    I spent a total of    30   minutes face to face and non-face to face on the date of this visit.This includes time preparing to see the patient (eg, review of tests, notes), obtaining and/or reviewing additional history from an independent historian and/or outside medical records, documenting clinical information in the electronic health record, independently interpreting results and/or  communicating results to the patient/family/caregiver, or care coordinator.  Visit today included increased complexity associated with the care of the episodic problem addressed and managing the longitudinal care of the patient due to the serious and/or complex managed problem(s).    This note was generated with the assistance of ambient listening technology. Verbal consent was obtained by the patient and accompanying visitor(s) for the recording of patient appointment to facilitate this note. I attest to having reviewed and edited the generated note for accuracy, though some syntax or spelling errors may persist. Please contact the author of this note for any clarification.       There are no Patient Instructions on file for this visit.

## 2024-09-05 ENCOUNTER — PATIENT OUTREACH (OUTPATIENT)
Dept: ADMINISTRATIVE | Facility: HOSPITAL | Age: 29
End: 2024-09-05
Payer: COMMERCIAL

## 2024-09-09 ENCOUNTER — E-VISIT (OUTPATIENT)
Dept: PRIMARY CARE CLINIC | Facility: CLINIC | Age: 29
End: 2024-09-09
Payer: COMMERCIAL

## 2024-09-09 ENCOUNTER — PATIENT MESSAGE (OUTPATIENT)
Dept: PRIMARY CARE CLINIC | Facility: CLINIC | Age: 29
End: 2024-09-09
Payer: COMMERCIAL

## 2024-09-09 DIAGNOSIS — M35.00 SICCA, UNSPECIFIED TYPE: ICD-10-CM

## 2024-09-09 DIAGNOSIS — N76.0 ACUTE VAGINITIS: Primary | ICD-10-CM

## 2024-09-09 NOTE — PROGRESS NOTES
Patient ID: Charlotte Patricia is a 29 y.o. female.    Chief Complaint: Vaginal Discharge (Entered automatically based on patient selection in [employer].)    The patient initiated a request through [employer] on 9/9/2024 for evaluation and management with a chief complaint of Vaginal Discharge (Entered automatically based on patient selection in [employer].)     I evaluated the questionnaire submission on 09/09/2024.    Ohs Peq Evisit Vaginal Concerns    9/9/2024  2:01 PM CDT - Filed by Patient   Do you agree to participate in an E-Visit? Yes   If you have any of the following symptoms,  please do not complete an E-Visit,  schedule an appointment with your provider: I acknowledge   Choose the state of your primary residence [place]   Are you pregnant, could you be pregnant, or are you breast feeding? None of the above   What is the main issue you would like addressed today? Candida overgrowth/yeast infection   Which of the following vaginal concerns do you have? Dryness;  Itching;  Other   Describe your symptoms. Tenderness   Do you have vaginal discharge? Clear discharge   Do you have pain while passing urine? Yes   Do you have any of the following symptoms? Frequent urination;  Pain during bowel movement;  Pain during sex    Have you taken antibiotics in the last two weeks? No    Do you use any of the following? No   Which of the following applies to your menstrual period? Periods are unpredictable   Which of the following applies to your menstrual cycle? No bleeding   Do you have spotting between periods? No   Do you have pain with your period? No   Have you had similar symptoms in the past? More than once   When you had similar symptoms in the past, did any of the following work? Not sure   Have you had a temperature of 100.4 or higher? No   Provide any additional information you feel is important.    Please attach any relevant images or files    Are you able to take your vital signs? No     Additional HPI  information:   I havent been on any antibiotic. I cant say Ive noticed anything with my discharge. More so just always feeling itchiness down there which worries me, sometimes burning when I pee or have sex. So I wasnt really sure that I have a yeast infection. But just looking at other symptoms concerning candida overgrowth I was wondering if maybe Im experiencing that and/or if its just something associated with the lupus. I have dry mouth, cottony and Id say white-carolina on my tongue. Some of the other symptoms I read, strong sugar cravings, digestive issues, depression/fatigue     Active Problem List with Overview Notes    Diagnosis Date Noted    Insulin resistance 11/14/2023    Prediabetes 11/14/2023    Lupus arthritis 11/10/2023    BUSTER positive 09/19/2023    Class 2 severe obesity due to excess calories with serious comorbidity and body mass index (BMI) of 35.0 to 35.9 in adult 06/13/2023    Abnormal weight gain 06/13/2023    Fatigue 06/13/2023    Decreased libido 06/13/2023    Hypertrophy of both inferior nasal turbinates 02/02/2022    Nasal obstruction 02/02/2022    Nasal septal deviation 02/02/2022    Fatty liver 11/01/2021     fatty liver on u/s, still present, not improved, some with menses and worse with cycle and alcohol. setup hida. f/u afterwards, if hida neg then gyn       Chronic RUQ pain 11/01/2021     fatty liver on u/s, still present, not improved, some with menses and worse with cycle and alcohol. setup hida. f/u afterwards, if hida neg then gyn       Nausea 11/01/2021     fatty liver on u/s, still present, not improved, some with menses and worse with cycle and alcohol. setup hida. f/u afterwards, if hida neg then gyn       Acne       Recent Labs Obtained:  No visits with results within 7 Day(s) from this visit.   Latest known visit with results is:   Lab Visit on 08/12/2024   Component Date Value Ref Range Status    TSH 08/12/2024 0.938  0.400 - 4.000 uIU/mL Final    Free T4 08/12/2024  0.90  0.71 - 1.51 ng/dL Final    Cholesterol 08/12/2024 178  120 - 199 mg/dL Final    Comment: The National Cholesterol Education Program (NCEP) has set the  following guidelines (reference ranges) for Cholesterol:  Optimal.....................<200 mg/dL  Borderline High.............200-239 mg/dL  High........................> or = 240 mg/dL      Triglycerides 08/12/2024 145  30 - 150 mg/dL Final    Comment: The National Cholesterol Education Program (NCEP) has set the  following guidelines (reference values) for triglycerides:  Normal......................<150 mg/dL  Borderline High.............150-199 mg/dL  High........................200-499 mg/dL      HDL 08/12/2024 45  40 - 75 mg/dL Final    Comment: The National Cholesterol Education Program (NCEP) has set the  following guidelines (reference values) for HDL Cholesterol:  Low...............<40 mg/dL  Optimal...........>60 mg/dL      LDL Cholesterol 08/12/2024 104.0  63.0 - 159.0 mg/dL Final    Comment: The National Cholesterol Education Program (NCEP) has set the  following guidelines (reference values) for LDL Cholesterol:  Optimal.......................<130 mg/dL  Borderline High...............130-159 mg/dL  High..........................160-189 mg/dL  Very High.....................>190 mg/dL      HDL/Cholesterol Ratio 08/12/2024 25.3  20.0 - 50.0 % Final    Total Cholesterol/HDL Ratio 08/12/2024 4.0  2.0 - 5.0 Final    Non-HDL Cholesterol 08/12/2024 133  mg/dL Final    Comment: Risk category and Non-HDL cholesterol goals:  Coronary heart disease (CHD)or equivalent (10-year risk of CHD >20%):  Non-HDL cholesterol goal     <130 mg/dL  Two or more CHD risk factors and 10-year risk of CHD <= 20%:  Non-HDL cholesterol goal     <160 mg/dL  0 to 1 CHD risk factor:  Non-HDL cholesterol goal     <190 mg/dL      Hemoglobin A1C 08/12/2024 5.5  4.0 - 5.6 % Final    Comment: ADA Screening Guidelines:  5.7-6.4%  Consistent with prediabetes  >or=6.5%  Consistent with  "diabetes    High levels of fetal hemoglobin interfere with the HbA1C  assay. Heterozygous hemoglobin variants (HbS, HgC, etc)do  not significantly interfere with this assay.   However, presence of multiple variants may affect accuracy.      Estimated Avg Glucose 08/12/2024 111  68 - 131 mg/dL Final       Encounter Diagnoses   Name Primary?    Acute vaginitis Yes    Sicca, unspecified type         No orders of the defined types were placed in this encounter.         Charlotte was seen today for vaginal discharge.    Diagnoses and all orders for this visit:    Acute vaginitis  Comments:  recommend referral to Ob/gyn and to discuss with rheum about addtional evaluation with SICCA symptoms.    Sicca, unspecified type       No follow-ups on file.      E-Visit Time Tracking:    Day 1 Time (in minutes): 10    Total Time (in minutes): 10              Patient Instructions   I think based on your symptoms it would be best to get evaluated by your ob/gyn to do some swabbing to test for yeast or BV instead of just "empirically" treating with an anti-yeast pill. Once you take one, it can sometimes shift your pH to the opposite and cause more issues.     Lupus doesn't predispose you to yeast infections but you can have dry mouth and other SICCA type symptoms with some other inflammatory or autoimmune conditions. So also good to reach out to Dr. Mott to discuss your concerns as well.     Please let me know if you have further questions.    Sincerely,   Love Reynolds MD      "

## 2024-09-09 NOTE — PATIENT INSTRUCTIONS
"I think based on your symptoms it would be best to get evaluated by your ob/gyn to do some swabbing to test for yeast or BV instead of just "empirically" treating with an anti-yeast pill. Once you take one, it can sometimes shift your pH to the opposite and cause more issues.     Lupus doesn't predispose you to yeast infections but you can have dry mouth and other SICCA type symptoms with some other inflammatory or autoimmune conditions. So also good to reach out to Dr. Mott to discuss your concerns as well.     Please let me know if you have further questions.    Sincerely,   Love Reynolds MD  "

## 2024-09-17 ENCOUNTER — PATIENT MESSAGE (OUTPATIENT)
Dept: RHEUMATOLOGY | Facility: CLINIC | Age: 29
End: 2024-09-17
Payer: COMMERCIAL

## 2024-09-17 RX ORDER — AZATHIOPRINE 100 MG/1
100 TABLET ORAL DAILY
Qty: 30 TABLET | Refills: 2 | Status: SHIPPED | OUTPATIENT
Start: 2024-09-17

## 2024-09-17 RX ORDER — AZATHIOPRINE 50 MG/1
50 TABLET ORAL DAILY
Qty: 30 TABLET | Refills: 0 | Status: SHIPPED | OUTPATIENT
Start: 2024-09-17 | End: 2024-09-17

## 2024-09-17 RX ORDER — AZATHIOPRINE 50 MG/1
50 TABLET ORAL DAILY
Qty: 30 TABLET | Refills: 0 | Status: SHIPPED | OUTPATIENT
Start: 2024-09-17 | End: 2024-10-17

## 2024-09-17 RX ORDER — AZATHIOPRINE 100 MG/1
100 TABLET ORAL DAILY
Qty: 30 TABLET | Refills: 2 | Status: SHIPPED | OUTPATIENT
Start: 2024-09-17 | End: 2024-09-17

## 2024-10-10 ENCOUNTER — OFFICE VISIT (OUTPATIENT)
Dept: URGENT CARE | Facility: CLINIC | Age: 29
End: 2024-10-10
Payer: COMMERCIAL

## 2024-10-10 VITALS
RESPIRATION RATE: 18 BRPM | HEIGHT: 66 IN | TEMPERATURE: 98 F | DIASTOLIC BLOOD PRESSURE: 76 MMHG | WEIGHT: 215.63 LBS | BODY MASS INDEX: 34.65 KG/M2 | HEART RATE: 125 BPM | OXYGEN SATURATION: 98 % | SYSTOLIC BLOOD PRESSURE: 108 MMHG

## 2024-10-10 DIAGNOSIS — M79.10 MYALGIA: Primary | ICD-10-CM

## 2024-10-10 DIAGNOSIS — R11.0 NAUSEA: ICD-10-CM

## 2024-10-10 DIAGNOSIS — R10.9 ABDOMINAL PAIN, UNSPECIFIED ABDOMINAL LOCATION: ICD-10-CM

## 2024-10-10 DIAGNOSIS — A08.4 VIRAL GASTROENTERITIS: ICD-10-CM

## 2024-10-10 LAB
B-HCG UR QL: NEGATIVE
BILIRUBIN, UA POC OHS: ABNORMAL
BLOOD, UA POC OHS: ABNORMAL
CLARITY, UA POC OHS: CLEAR
COLOR, UA POC OHS: ABNORMAL
CTP QC/QA: YES
CTP QC/QA: YES
GLUCOSE, UA POC OHS: NEGATIVE
KETONES, UA POC OHS: 80
LEUKOCYTES, UA POC OHS: NEGATIVE
NITRITE, UA POC OHS: NEGATIVE
PH, UA POC OHS: 6
PROTEIN, UA POC OHS: >=300
SARS-COV-2 AG RESP QL IA.RAPID: NEGATIVE
SPECIFIC GRAVITY, UA POC OHS: >=1.03
UROBILINOGEN, UA POC OHS: 1

## 2024-10-10 RX ORDER — ONDANSETRON 4 MG/1
4 TABLET, ORALLY DISINTEGRATING ORAL
Status: COMPLETED | OUTPATIENT
Start: 2024-10-10 | End: 2024-10-10

## 2024-10-10 RX ORDER — METRONIDAZOLE 7.5 MG/G
0.75 CREAM TOPICAL
COMMUNITY
Start: 2024-08-22

## 2024-10-10 RX ORDER — ONDANSETRON 4 MG/1
4 TABLET, ORALLY DISINTEGRATING ORAL EVERY 6 HOURS PRN
Qty: 12 TABLET | Refills: 0 | Status: SHIPPED | OUTPATIENT
Start: 2024-10-10

## 2024-10-10 RX ORDER — PIMECROLIMUS 10 MG/G
1 CREAM TOPICAL
COMMUNITY
Start: 2024-08-22

## 2024-10-10 RX ADMIN — ONDANSETRON 4 MG: 4 TABLET, ORALLY DISINTEGRATING ORAL at 10:10

## 2024-10-10 NOTE — PATIENT INSTRUCTIONS
GASTROENTERITIS/Stomach Upset:   Abdominal Pain   If your condition worsens or fails to improve we recommend that you receive another evaluation at the ER immediately or contact your PCP to discuss your concerns or return here. You must understand that you've received an urgent care treatment only and that you may be released before all your medical problems are known or treated. You the patient will arrange for followup care as instructed.   Please make sure you are SLOWLY TAKING SMALL SIPS of fluids (Recommend: 1/2 Gatorade/Power-aid/Body Tobi/Pedialyte + 1/2 water) and getting plenty of rest.    Eat a bland diet of bananas, rice, applesauce, toast, crackers--advance your diet as you tolerate these foods.    You were prescribed   ZOFRAN for nausea   YOU MAY TAKE OVER-THE-COUNTER PEPTO-BISMOL FOR STOMACH CRAMPING/DIARRHEA.  This may darken your stools.     Please do not take anything that would stop diarrhea-- For example Imodium.  You want to rid toxins and be sure to remain hydrated    Please follow-up with your primary care provider if your symptoms continue for longer than 5-7 days.    Diarrhea  If you have diarrhea you can use Pepto Bismol.  Avoid Imodium unless you have more than 6 episodes of diarrhea in 24 hours.   Avoid any greasy, spicy, fatty or acidic foods at this time.    Increase fluids and rest is important.  Start a clear liquid diet and progress to BRAT diet (see attached handout).       Nausea & Vomiting  If your condition worsens or fails to improve we recommend that you receive another evaluation at the ER immediately or contact your PCP to discuss your concerns or return here. You must understand that you've received an urgent care treatment only and that you may be released before all your medical problems are known or treated. You the patient will arrange for followup care as instructed.   Use prescribed anti-nausea medicine as needed and prescribed. OTC anti-nausea Imitrol as needed.    Increase fluids and rest is important   Start off with liquid diet and progress as tolerated (see below)  Water and clear liquids are important so you do not get dehydrated. Drink a small amount at a time.  Do not force yourself to eat, especially if you have cramps, vomiting, or diarrhea. When you finally decide to start eating, do not eat large amounts at a time, even if you are hungry.  If you eat, avoid fatty, greasy, spicy, or fried foods.  Do not eat dairy products if you have diarrhea; they can make the diarrhea worse      Watch for any increase pain, fever, localized pain to right lower abdomen or continued vomiting or diarrhea. Please report to the nearest ER if this is to occur or if you develop new/ concerning or worsening of your symptoms.

## 2024-10-10 NOTE — PROGRESS NOTES
"Subjective:      Patient ID: Charlotte Patricia is a 29 y.o. female.    Vitals:  height is 5' 6" (1.676 m) and weight is 97.8 kg (215 lb 9.8 oz). Her tympanic temperature is 97.7 °F (36.5 °C). Her blood pressure is 108/76 and her pulse is 125 (abnormal). Her respiration is 18 and oxygen saturation is 98%.     Chief Complaint: Nausea    Patient presents with nausea, lower abdominal pain, weakness, diarrhea, body aches. On set of symptoms Sunday. Patient denies new diet, trauma, strenuous exercise. Patient started new lupus medicine on 09/17/2024, denies these symptoms associated with new medication. OTC - Pedialyte, BRAT diet.    Nausea  This is a new problem. The current episode started in the past 7 days. The problem occurs constantly. The problem has been unchanged. Associated symptoms include abdominal pain, myalgias, nausea, vomiting and weakness. Pertinent negatives include no anorexia, arthralgias, change in bowel habit, chest pain, chills, congestion, coughing, diaphoresis, fatigue, fever, headaches, joint swelling, neck pain, numbness, rash, sore throat, swollen glands, urinary symptoms, vertigo or visual change. Nothing aggravates the symptoms. She has tried lying down, position changes, sleep, rest and drinking for the symptoms. The treatment provided moderate relief.       Constitution: Negative for chills, sweating, fatigue and fever.   HENT:  Negative for congestion and sore throat.    Neck: Negative for neck pain.   Cardiovascular:  Negative for chest pain.   Respiratory:  Negative for cough.    Gastrointestinal:  Positive for abdominal pain, nausea and vomiting.   Musculoskeletal:  Positive for muscle ache. Negative for joint pain and joint swelling.   Skin:  Negative for rash.   Neurological:  Negative for history of vertigo, headaches and numbness.      Objective:     Physical Exam   Constitutional: She is oriented to person, place, and time. She appears well-developed. She is cooperative. "   HENT:   Head: Normocephalic and atraumatic.   Ears:   Right Ear: Hearing, tympanic membrane, external ear and ear canal normal.   Left Ear: Hearing, tympanic membrane, external ear and ear canal normal.   Nose: Nose normal. No mucosal edema or nasal deformity. No epistaxis. Right sinus exhibits no maxillary sinus tenderness and no frontal sinus tenderness. Left sinus exhibits no maxillary sinus tenderness and no frontal sinus tenderness.   Mouth/Throat: Uvula is midline, oropharynx is clear and moist and mucous membranes are normal. Mucous membranes are moist. No trismus in the jaw. Normal dentition. No uvula swelling. Oropharynx is clear.   Eyes: Conjunctivae and lids are normal.   Neck: Trachea normal and phonation normal. Neck supple.   Cardiovascular: Normal rate, regular rhythm, normal heart sounds and normal pulses.   Pulmonary/Chest: Effort normal and breath sounds normal.   Abdominal: Normal appearance and bowel sounds are normal. Soft.   Musculoskeletal: Normal range of motion.         General: Normal range of motion.   Neurological: She is alert and oriented to person, place, and time. She exhibits normal muscle tone.   Skin: Skin is warm, dry and intact.   Psychiatric: Her speech is normal and behavior is normal. Judgment and thought content normal.   Nursing note and vitals reviewed.    Results for orders placed or performed in visit on 10/10/24   SARS Coronavirus 2 Antigen, POCT Manual Read    Collection Time: 10/10/24 10:01 AM   Result Value Ref Range    SARS Coronavirus 2 Antigen Negative Negative     Acceptable Yes    POCT Urinalysis(Instrument)    Collection Time: 10/10/24 10:21 AM   Result Value Ref Range    Color, POC UA Brown (A) Yellow, Straw, Colorless    Clarity, POC UA Clear Clear    Glucose, POC UA Negative Negative    Bilirubin, POC UA Moderate (A) Negative    Ketones, POC UA 80 (A) Negative    Spec Grav POC UA >=1.030 1.005 - 1.030    Blood, POC UA Moderate (A) Negative     pH, POC UA 6.0 5.0 - 8.0    Protein, POC UA >=300 (A) Negative    Urobilinogen, POC UA 1.0 <=1.0    Nitrite, POC UA Negative Negative    WBC, POC UA Negative Negative   POCT urine pregnancy    Collection Time: 10/10/24 10:22 AM   Result Value Ref Range    POC Preg Test, Ur Negative Negative     Acceptable Yes        Assessment:     1. Myalgia    2. Abdominal pain, unspecified abdominal location    3. Nausea    4. Viral gastroenteritis        Plan:       Myalgia  -     SARS Coronavirus 2 Antigen, POCT Manual Read    Abdominal pain, unspecified abdominal location  -     POCT Urinalysis(Instrument)  -     POCT urine pregnancy    Nausea  -     ondansetron disintegrating tablet 4 mg  -     ondansetron (ZOFRAN-ODT) 4 MG TbDL; Take 1 tablet (4 mg total) by mouth every 6 (six) hours as needed (nausea/vomiting).  Dispense: 12 tablet; Refill: 0    Viral gastroenteritis  -     ondansetron (ZOFRAN-ODT) 4 MG TbDL; Take 1 tablet (4 mg total) by mouth every 6 (six) hours as needed (nausea/vomiting).  Dispense: 12 tablet; Refill: 0          Medical Decision Making:   Urgent Care Management:  Patient presenting with nausea, vomiting, and diarrhea.  Vital signs were reviewed and are reassuring.  Patient afebrile.  Abdominal exam is benign.  No evidence of acute surgical emergency or infection requiring antibiotics. Differential diagnosis considered include biliary disease, bowel obstruction, colitis, mesenteric ischemia, appendicitis, urinary tract infection, infectious diarrhea, however, these are less likely given the history and exam.Discussed the importance of adequate hydration. Alternate between water and an electrolyte replacement beverage (pedialyte, pedialyte possible, liquid IV). Patient was instructed to Eat a bland diet as tolerated and Avoid heavily seasoned, spicy, or fatty foods. Additional plan of care as outlined above.  Advised no driving, alcohol, or other medications while taking antiemetics as  these medications may cause drowsiness. Patient was instructed to Follow up with her pcp if symptoms don't improve or for diarrhea that persists > 7 days. Advised patient to go to the ER if severe abdominal pain, inability to tolerate liquids, fever, pain in RLQ or other concerns. All questions were addressed and signs and symptoms of dehydration were reviewed. The patient verbalized understanding and agrees with the discussed plan of care. Patient remained stable and was discharged in no acute distress.                 Patient Instructions   GASTROENTERITIS/Stomach Upset:   Abdominal Pain   If your condition worsens or fails to improve we recommend that you receive another evaluation at the ER immediately or contact your PCP to discuss your concerns or return here. You must understand that you've received an urgent care treatment only and that you may be released before all your medical problems are known or treated. You the patient will arrange for followup care as instructed.   Please make sure you are SLOWLY TAKING SMALL SIPS of fluids (Recommend: 1/2 Gatorade/Power-aid/Body Tobi/Pedialyte + 1/2 water) and getting plenty of rest.    Eat a bland diet of bananas, rice, applesauce, toast, crackers--advance your diet as you tolerate these foods.    You were prescribed   ZOFRAN for nausea   YOU MAY TAKE OVER-THE-COUNTER PEPTO-BISMOL FOR STOMACH CRAMPING/DIARRHEA.  This may darken your stools.     Please do not take anything that would stop diarrhea-- For example Imodium.  You want to rid toxins and be sure to remain hydrated    Please follow-up with your primary care provider if your symptoms continue for longer than 5-7 days.    Diarrhea  If you have diarrhea you can use Pepto Bismol.  Avoid Imodium unless you have more than 6 episodes of diarrhea in 24 hours.   Avoid any greasy, spicy, fatty or acidic foods at this time.    Increase fluids and rest is important.  Start a clear liquid diet and progress to BRAT diet  (see attached handout).       Nausea & Vomiting  If your condition worsens or fails to improve we recommend that you receive another evaluation at the ER immediately or contact your PCP to discuss your concerns or return here. You must understand that you've received an urgent care treatment only and that you may be released before all your medical problems are known or treated. You the patient will arrange for followup care as instructed.   Use prescribed anti-nausea medicine as needed and prescribed. OTC anti-nausea Imitrol as needed.   Increase fluids and rest is important   Start off with liquid diet and progress as tolerated (see below)  Water and clear liquids are important so you do not get dehydrated. Drink a small amount at a time.  Do not force yourself to eat, especially if you have cramps, vomiting, or diarrhea. When you finally decide to start eating, do not eat large amounts at a time, even if you are hungry.  If you eat, avoid fatty, greasy, spicy, or fried foods.  Do not eat dairy products if you have diarrhea; they can make the diarrhea worse      Watch for any increase pain, fever, localized pain to right lower abdomen or continued vomiting or diarrhea. Please report to the nearest ER if this is to occur or if you develop new/ concerning or worsening of your symptoms.

## 2024-10-11 ENCOUNTER — PATIENT MESSAGE (OUTPATIENT)
Dept: PRIMARY CARE CLINIC | Facility: CLINIC | Age: 29
End: 2024-10-11
Payer: COMMERCIAL

## 2024-10-11 ENCOUNTER — PATIENT MESSAGE (OUTPATIENT)
Dept: RHEUMATOLOGY | Facility: CLINIC | Age: 29
End: 2024-10-11
Payer: COMMERCIAL

## 2024-10-11 ENCOUNTER — LAB VISIT (OUTPATIENT)
Dept: LAB | Facility: HOSPITAL | Age: 29
End: 2024-10-11
Attending: STUDENT IN AN ORGANIZED HEALTH CARE EDUCATION/TRAINING PROGRAM
Payer: COMMERCIAL

## 2024-10-11 DIAGNOSIS — Z79.899 IMMUNODEFICIENCY DUE TO DRUG THERAPY: ICD-10-CM

## 2024-10-11 DIAGNOSIS — Z79.60 ENCOUNTER FOR MONITORING IMMUNOSUPPRESSIVE MEDICATION THERAPY CAUSING IMMUNODEFICIENCY: ICD-10-CM

## 2024-10-11 DIAGNOSIS — M32.19 OTHER SYSTEMIC LUPUS ERYTHEMATOSUS WITH OTHER ORGAN INVOLVEMENT: ICD-10-CM

## 2024-10-11 DIAGNOSIS — Z51.81 ENCOUNTER FOR MONITORING IMMUNOSUPPRESSIVE MEDICATION THERAPY CAUSING IMMUNODEFICIENCY: ICD-10-CM

## 2024-10-11 DIAGNOSIS — D84.821 IMMUNODEFICIENCY DUE TO DRUG THERAPY: ICD-10-CM

## 2024-10-11 DIAGNOSIS — D84.821 ENCOUNTER FOR MONITORING IMMUNOSUPPRESSIVE MEDICATION THERAPY CAUSING IMMUNODEFICIENCY: ICD-10-CM

## 2024-10-11 LAB
ALBUMIN SERPL BCP-MCNC: 3.7 G/DL (ref 3.5–5.2)
ALP SERPL-CCNC: 118 U/L (ref 55–135)
ALT SERPL W/O P-5'-P-CCNC: 121 U/L (ref 10–44)
ANION GAP SERPL CALC-SCNC: 16 MMOL/L (ref 8–16)
AST SERPL-CCNC: 62 U/L (ref 10–40)
BACTERIA #/AREA URNS AUTO: NORMAL /HPF
BASOPHILS # BLD AUTO: 0.05 K/UL (ref 0–0.2)
BASOPHILS NFR BLD: 0.7 % (ref 0–1.9)
BILIRUB SERPL-MCNC: 0.5 MG/DL (ref 0.1–1)
BILIRUB UR QL STRIP: NEGATIVE
BUN SERPL-MCNC: 12 MG/DL (ref 6–20)
C3 SERPL-MCNC: 220 MG/DL (ref 50–180)
C4 SERPL-MCNC: 68 MG/DL (ref 11–44)
CALCIUM SERPL-MCNC: 9.5 MG/DL (ref 8.7–10.5)
CHLORIDE SERPL-SCNC: 102 MMOL/L (ref 95–110)
CLARITY UR REFRACT.AUTO: ABNORMAL
CO2 SERPL-SCNC: 20 MMOL/L (ref 23–29)
COLOR UR AUTO: YELLOW
CREAT SERPL-MCNC: 1 MG/DL (ref 0.5–1.4)
CREAT UR-MCNC: 397 MG/DL (ref 15–325)
CRP SERPL-MCNC: 197.3 MG/L (ref 0–8.2)
DIFFERENTIAL METHOD BLD: NORMAL
EOSINOPHIL # BLD AUTO: 0.2 K/UL (ref 0–0.5)
EOSINOPHIL NFR BLD: 3.1 % (ref 0–8)
ERYTHROCYTE [DISTWIDTH] IN BLOOD BY AUTOMATED COUNT: 13 % (ref 11.5–14.5)
ERYTHROCYTE [SEDIMENTATION RATE] IN BLOOD BY PHOTOMETRIC METHOD: 45 MM/HR (ref 0–36)
EST. GFR  (NO RACE VARIABLE): >60 ML/MIN/1.73 M^2
GLUCOSE SERPL-MCNC: 74 MG/DL (ref 70–110)
GLUCOSE UR QL STRIP: NEGATIVE
HCT VFR BLD AUTO: 46 % (ref 37–48.5)
HGB BLD-MCNC: 15.5 G/DL (ref 12–16)
HGB UR QL STRIP: ABNORMAL
HYALINE CASTS UR QL AUTO: 0 /LPF
IMM GRANULOCYTES # BLD AUTO: 0.03 K/UL (ref 0–0.04)
IMM GRANULOCYTES NFR BLD AUTO: 0.4 % (ref 0–0.5)
KETONES UR QL STRIP: ABNORMAL
LEUKOCYTE ESTERASE UR QL STRIP: NEGATIVE
LYMPHOCYTES # BLD AUTO: 2.1 K/UL (ref 1–4.8)
LYMPHOCYTES NFR BLD: 30.3 % (ref 18–48)
MCH RBC QN AUTO: 29.5 PG (ref 27–31)
MCHC RBC AUTO-ENTMCNC: 33.7 G/DL (ref 32–36)
MCV RBC AUTO: 88 FL (ref 82–98)
MICROSCOPIC COMMENT: NORMAL
MONOCYTES # BLD AUTO: 0.5 K/UL (ref 0.3–1)
MONOCYTES NFR BLD: 7.6 % (ref 4–15)
NEUTROPHILS # BLD AUTO: 4.1 K/UL (ref 1.8–7.7)
NEUTROPHILS NFR BLD: 57.9 % (ref 38–73)
NITRITE UR QL STRIP: NEGATIVE
NRBC BLD-RTO: 0 /100 WBC
PH UR STRIP: 6 [PH] (ref 5–8)
PLATELET # BLD AUTO: 266 K/UL (ref 150–450)
PMV BLD AUTO: 11.9 FL (ref 9.2–12.9)
POTASSIUM SERPL-SCNC: 3.3 MMOL/L (ref 3.5–5.1)
PROT SERPL-MCNC: 8.3 G/DL (ref 6–8.4)
PROT UR QL STRIP: ABNORMAL
PROT UR-MCNC: 156 MG/DL (ref 0–15)
PROT/CREAT UR: 0.39 MG/G{CREAT} (ref 0–0.2)
RBC # BLD AUTO: 5.26 M/UL (ref 4–5.4)
RBC #/AREA URNS AUTO: 1 /HPF (ref 0–4)
SODIUM SERPL-SCNC: 138 MMOL/L (ref 136–145)
SP GR UR STRIP: 1.02 (ref 1–1.03)
SQUAMOUS #/AREA URNS AUTO: 4 /HPF
URN SPEC COLLECT METH UR: ABNORMAL
WBC # BLD AUTO: 7.06 K/UL (ref 3.9–12.7)
WBC #/AREA URNS AUTO: 2 /HPF (ref 0–5)

## 2024-10-11 PROCEDURE — 36415 COLL VENOUS BLD VENIPUNCTURE: CPT | Mod: PO | Performed by: STUDENT IN AN ORGANIZED HEALTH CARE EDUCATION/TRAINING PROGRAM

## 2024-10-11 PROCEDURE — 85520 HEPARIN ASSAY: CPT | Performed by: STUDENT IN AN ORGANIZED HEALTH CARE EDUCATION/TRAINING PROGRAM

## 2024-10-11 PROCEDURE — 85613 RUSSELL VIPER VENOM DILUTED: CPT | Mod: 59 | Performed by: STUDENT IN AN ORGANIZED HEALTH CARE EDUCATION/TRAINING PROGRAM

## 2024-10-11 PROCEDURE — 85613 RUSSELL VIPER VENOM DILUTED: CPT | Performed by: STUDENT IN AN ORGANIZED HEALTH CARE EDUCATION/TRAINING PROGRAM

## 2024-10-11 PROCEDURE — 81001 URINALYSIS AUTO W/SCOPE: CPT | Performed by: STUDENT IN AN ORGANIZED HEALTH CARE EDUCATION/TRAINING PROGRAM

## 2024-10-11 PROCEDURE — 80053 COMPREHEN METABOLIC PANEL: CPT | Performed by: STUDENT IN AN ORGANIZED HEALTH CARE EDUCATION/TRAINING PROGRAM

## 2024-10-11 PROCEDURE — 84156 ASSAY OF PROTEIN URINE: CPT | Performed by: STUDENT IN AN ORGANIZED HEALTH CARE EDUCATION/TRAINING PROGRAM

## 2024-10-11 PROCEDURE — 85730 THROMBOPLASTIN TIME PARTIAL: CPT | Performed by: STUDENT IN AN ORGANIZED HEALTH CARE EDUCATION/TRAINING PROGRAM

## 2024-10-11 PROCEDURE — 85670 THROMBIN TIME PLASMA: CPT | Performed by: STUDENT IN AN ORGANIZED HEALTH CARE EDUCATION/TRAINING PROGRAM

## 2024-10-11 PROCEDURE — 85652 RBC SED RATE AUTOMATED: CPT | Performed by: STUDENT IN AN ORGANIZED HEALTH CARE EDUCATION/TRAINING PROGRAM

## 2024-10-11 PROCEDURE — 86225 DNA ANTIBODY NATIVE: CPT | Mod: 59 | Performed by: STUDENT IN AN ORGANIZED HEALTH CARE EDUCATION/TRAINING PROGRAM

## 2024-10-11 PROCEDURE — 85598 HEXAGNAL PHOSPH PLTLT NEUTRL: CPT | Performed by: STUDENT IN AN ORGANIZED HEALTH CARE EDUCATION/TRAINING PROGRAM

## 2024-10-11 PROCEDURE — 86160 COMPLEMENT ANTIGEN: CPT | Performed by: STUDENT IN AN ORGANIZED HEALTH CARE EDUCATION/TRAINING PROGRAM

## 2024-10-11 PROCEDURE — 86140 C-REACTIVE PROTEIN: CPT | Performed by: STUDENT IN AN ORGANIZED HEALTH CARE EDUCATION/TRAINING PROGRAM

## 2024-10-11 PROCEDURE — 86160 COMPLEMENT ANTIGEN: CPT | Mod: 59 | Performed by: STUDENT IN AN ORGANIZED HEALTH CARE EDUCATION/TRAINING PROGRAM

## 2024-10-11 PROCEDURE — 86146 BETA-2 GLYCOPROTEIN ANTIBODY: CPT | Mod: 59 | Performed by: STUDENT IN AN ORGANIZED HEALTH CARE EDUCATION/TRAINING PROGRAM

## 2024-10-11 PROCEDURE — 85025 COMPLETE CBC W/AUTO DIFF WBC: CPT | Performed by: STUDENT IN AN ORGANIZED HEALTH CARE EDUCATION/TRAINING PROGRAM

## 2024-10-11 PROCEDURE — 86147 CARDIOLIPIN ANTIBODY EA IG: CPT | Performed by: STUDENT IN AN ORGANIZED HEALTH CARE EDUCATION/TRAINING PROGRAM

## 2024-10-11 PROCEDURE — 86225 DNA ANTIBODY NATIVE: CPT | Performed by: STUDENT IN AN ORGANIZED HEALTH CARE EDUCATION/TRAINING PROGRAM

## 2024-10-14 LAB
B2 GLYCOPROT1 IGA SER QL: 2.2 U/ML
B2 GLYCOPROT1 IGG SER QL: <0.8 U/ML
B2 GLYCOPROT1 IGM SER QL: <2.4 U/ML
CARDIOLIPIN IGG SER IA-ACNC: <9.4 GPL (ref 0–14.99)
CARDIOLIPIN IGM SER IA-ACNC: <9.4 MPL (ref 0–12.49)

## 2024-10-15 LAB
CONFIRM DRVVT STA-STACLOT: 26.9 S
DNA TITER: NORMAL
DRVVT SCREEN TO CONFIRM RATIO: 1.17 {RATIO}
DSDNA AB SER-ACNC: POSITIVE [IU]/ML
HEPARIN NT PPP QL: ABNORMAL
LA 3 SCREEN W REFLEX-IMP: ABNORMAL
LMW HEPARIN IND PLT AB SER QL: ABNORMAL
MIXING DRVVT/NORMAL: 1.24 %
NEUTRALIZED DRVVT SCREEN RATIO: ABNORMAL
PROTHROMBIN TIME: 12.6 S (ref 12–15.5)
SCREEN APTT/NORMAL: 1.36
SCREEN APTT/NORMAL: ABNORMAL
SCREEN DRVVT/NORMAL: 1.49 %
THROMBIN TIME: 17.6 S

## 2024-10-18 ENCOUNTER — OFFICE VISIT (OUTPATIENT)
Dept: RHEUMATOLOGY | Facility: CLINIC | Age: 29
End: 2024-10-18
Payer: COMMERCIAL

## 2024-10-18 ENCOUNTER — LAB VISIT (OUTPATIENT)
Dept: LAB | Facility: HOSPITAL | Age: 29
End: 2024-10-18
Attending: STUDENT IN AN ORGANIZED HEALTH CARE EDUCATION/TRAINING PROGRAM
Payer: COMMERCIAL

## 2024-10-18 DIAGNOSIS — D84.821 IMMUNODEFICIENCY DUE TO DRUG THERAPY: ICD-10-CM

## 2024-10-18 DIAGNOSIS — R11.2 NAUSEA AND VOMITING, UNSPECIFIED VOMITING TYPE: ICD-10-CM

## 2024-10-18 DIAGNOSIS — R74.8 ELEVATED LIVER ENZYMES: ICD-10-CM

## 2024-10-18 DIAGNOSIS — R10.84 GENERALIZED ABDOMINAL PAIN: ICD-10-CM

## 2024-10-18 DIAGNOSIS — Z79.899 LONG-TERM USE OF PLAQUENIL: ICD-10-CM

## 2024-10-18 DIAGNOSIS — M32.19 OTHER SYSTEMIC LUPUS ERYTHEMATOSUS WITH OTHER ORGAN INVOLVEMENT: Primary | ICD-10-CM

## 2024-10-18 DIAGNOSIS — R76.8 DS DNA ANTIBODY POSITIVE: ICD-10-CM

## 2024-10-18 DIAGNOSIS — Z79.60 ENCOUNTER FOR MONITORING IMMUNOSUPPRESSIVE MEDICATION THERAPY CAUSING IMMUNODEFICIENCY: ICD-10-CM

## 2024-10-18 DIAGNOSIS — Z79.899 HIGH RISK MEDICATION USE: ICD-10-CM

## 2024-10-18 DIAGNOSIS — Z79.899 IMMUNODEFICIENCY DUE TO DRUG THERAPY: ICD-10-CM

## 2024-10-18 DIAGNOSIS — M35.00 SICCA SYNDROME: ICD-10-CM

## 2024-10-18 DIAGNOSIS — Z51.81 ENCOUNTER FOR MONITORING IMMUNOSUPPRESSIVE MEDICATION THERAPY CAUSING IMMUNODEFICIENCY: ICD-10-CM

## 2024-10-18 DIAGNOSIS — D84.821 ENCOUNTER FOR MONITORING IMMUNOSUPPRESSIVE MEDICATION THERAPY CAUSING IMMUNODEFICIENCY: ICD-10-CM

## 2024-10-18 LAB
25(OH)D3+25(OH)D2 SERPL-MCNC: 50 NG/ML (ref 30–96)
PROCALCITONIN SERPL IA-MCNC: 0.1 NG/ML

## 2024-10-18 PROCEDURE — 82306 VITAMIN D 25 HYDROXY: CPT | Performed by: STUDENT IN AN ORGANIZED HEALTH CARE EDUCATION/TRAINING PROGRAM

## 2024-10-18 PROCEDURE — 84145 PROCALCITONIN (PCT): CPT | Performed by: STUDENT IN AN ORGANIZED HEALTH CARE EDUCATION/TRAINING PROGRAM

## 2024-10-18 PROCEDURE — 86663 EPSTEIN-BARR ANTIBODY: CPT | Performed by: STUDENT IN AN ORGANIZED HEALTH CARE EDUCATION/TRAINING PROGRAM

## 2024-10-18 PROCEDURE — 36415 COLL VENOUS BLD VENIPUNCTURE: CPT | Mod: PO | Performed by: STUDENT IN AN ORGANIZED HEALTH CARE EDUCATION/TRAINING PROGRAM

## 2024-10-18 PROCEDURE — 86645 CMV ANTIBODY IGM: CPT | Performed by: STUDENT IN AN ORGANIZED HEALTH CARE EDUCATION/TRAINING PROGRAM

## 2024-10-18 PROCEDURE — 86665 EPSTEIN-BARR CAPSID VCA: CPT | Mod: 59 | Performed by: STUDENT IN AN ORGANIZED HEALTH CARE EDUCATION/TRAINING PROGRAM

## 2024-10-18 NOTE — PROGRESS NOTES
The patient location is: [place]  The chief complaint leading to consultation is: SLE    Visit type: audiovisual    Face to Face time with patient: 22 minutes  49 minutes of total time spent on the encounter, which includes face to face time and non-face to face time preparing to see the patient (eg, review of tests), Obtaining and/or reviewing separately obtained history, Documenting clinical information in the electronic or other health record, Independently interpreting results (not separately reported) and communicating results to the patient/family/caregiver, or Care coordination (not separately reported).         Each patient to whom he or she provides medical services by telemedicine is:  (1) informed of the relationship between the physician and patient and the respective role of any other health care provider with respect to management of the patient; and (2) notified that he or she may decline to receive medical services by telemedicine and may withdraw from such care at any time.    Notes:       Subjective:      Patient ID: Charlotte Patricia is a 29 y.o. female.    Chief Complaint: SLE follow up    HPI:   Patient presents for Rheumatology follow up for lupus with positive BUSTER, dsDNA, and joint pain and stiffness. She is on Plaquenil 200 mg BID and azathioprine 100 mg daily. She was on Benlysta injections previously but we switched to azathioprine last visit because arthralgias were uncontrolled and she wants to get pregnant in the next couple of years. BUSTER initially found positive in 06/2023 during workup of elevated ALT and AST. 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 No autoimmune hepatitis.     Today she reports she's not doing well. Last week 10/06-10/10 she had vomiting and diarrhea. She let me know on 10/10 and I asked her to hold Imuran for suspicion of infection. She had started the Imuran several weeks prior and had been tolerating it well. She had negative COVID test at Urgent Care. Her symptoms  improved but then again had terrible nausea and vomiting yesterday. Feels a little better today. Notes that she resumed Imuran yesterday and did not take it today. Associated symptoms of chills, body aches. No fever. No joint swelling. No abdominal tenderness. No pleurisy. Since stopping Benlysta and starting AZA, she's a bit more achy in the knees at night. Hands are always achy. Denies joint swelling, significant stiffness, skin rashes, oral ulcers, patchy alopecia, sicca symptoms, cardiopulmonary symptoms, eye inflammation, IBD, fevers, weight loss, Raynaud's. Rheumatology review of systems is otherwise negative.      Initial hx:  Gets photosensitive rashes on the cheeks and forehead with sun exposure, lasts a few days.  Morning stiffness 30 minutes to an hour. Also gets joint stiffness after a period of rest. Stays mostly sedentary because she works from home.  +dry eyes, +dry mouth  +acid reflux  Hands, feet, legs go numb and tingle constantly.  Gets itchy all over.  Sometimes teeth or gums hurt. +TMJ pain. Sees Dentist and got a new mouth guard.  Deals with depression and anxiety which is worse in the past year. Therefore not getting much exercise.    No telangiectasias   No calcinosis   No psoriasis   No patchy alopecia   No oral or nasal ulcers   No pleurisy, chest pain, dyspnea, cough  No dysphagia, diplopia, dysphonia  No muscle weakness   No nausea, vomiting, diarrhea, constipation   No Raynaud's  No digital ulcers   No cytopenias   No renal issues   No blood clots   No fever, chills, night sweats, weight loss, or loss of appetite   No pregnancy losses, pre-term deliveries, or pregnancy complications   No new onset headaches   No recurrent conjunctivitis, uveitis, scleritis, or episcleritis   No chronic or bloody diarrhea. No Ulcerative Colitis or Crohn's (IBD)  No vaginal or penile and urethral discharge/STDs/ulcers   No unexplained lymphadenopathy  No parotitis   No seizures, strokes, psychosis  No  sclerodactyly  No puffy hands  No perioral tightness     Social Hx: Never smoker. Rare alcohol use.  Family Hx: Mother has fibromyalgia. No family history of autoimmune disease      Objective:   LMP 05/13/2024 (Approximate) Comment: Patient takes birthcontrol  Physical Exam  Constitutional:       General: She is not in acute distress.     Comments: Appears pale, tired.   Pulmonary:      Comments: Deep breaths - no pleuritic chest pain.  Abdominal:      Comments: She palpates her abdomen, nontender.   Skin:     Findings: No rash.             Assessment and Plan:     Problem List Items Addressed This Visit    None  Visit Diagnoses       Other systemic lupus erythematosus with other organ involvement    -  Primary    Relevant Orders    Anti-DNA Ab, Double-Stranded    C3 Complement    C4 Complement    CBC Auto Differential    Comprehensive Metabolic Panel    C-Reactive Protein    Protein/Creatinine Ratio, Urine    Sedimentation rate    Urinalysis    DRVVT    Immunodeficiency due to drug therapy        Relevant Orders    ANDREW-BARR VIRUS ANTIBODY PANEL    CYTOMEGALOVIRUS (CMV) AB, IGM    HEPATITIS PANEL, ACUTE    Vitamin D    Procalcitonin    Anti-DNA Ab, Double-Stranded    C3 Complement    C4 Complement    CBC Auto Differential    Comprehensive Metabolic Panel    C-Reactive Protein    Protein/Creatinine Ratio, Urine    Sedimentation rate    Urinalysis    DRVVT    High risk medication use        Relevant Orders    Anti-DNA Ab, Double-Stranded    C3 Complement    C4 Complement    CBC Auto Differential    Comprehensive Metabolic Panel    C-Reactive Protein    Protein/Creatinine Ratio, Urine    Sedimentation rate    Urinalysis    DRVVT    Long-term use of Plaquenil        Encounter for monitoring immunosuppressive medication therapy causing immunodeficiency        Relevant Orders    Anti-DNA Ab, Double-Stranded    C3 Complement    C4 Complement    CBC Auto Differential    Comprehensive Metabolic Panel    C-Reactive  Protein    Protein/Creatinine Ratio, Urine    Sedimentation rate    Urinalysis    DRVVT    Sicca syndrome        Ds DNA antibody positive        Nausea and vomiting, unspecified vomiting type        Relevant Orders    ANDREW-BARR VIRUS ANTIBODY PANEL    CYTOMEGALOVIRUS (CMV) AB, IGM    HEPATITIS PANEL, ACUTE    Vitamin D    Procalcitonin    CT Abdomen Pelvis  Without Contrast    Elevated liver enzymes        Relevant Orders    ANDREW-BARR VIRUS ANTIBODY PANEL    CYTOMEGALOVIRUS (CMV) AB, IGM    HEPATITIS PANEL, ACUTE    Vitamin D    Procalcitonin    Generalized abdominal pain        Relevant Orders    CT Abdomen Pelvis  Without Contrast              Patient presents for Rheumatology follow up for lupus.    SLE  BUSTER+ >1:2560 homogenous and >1:2560 speckled.  dsDNA+ >1:5120  SSB+    Manifestations are photosensitive rash, sicca symptoms, joint pain, and stiffness.    Reviewed labs as above.  Elevated ESR 40s,  (suggests infection).  Elevated ALT and AST above her normal.   Positive lupus anticoagulant. Will need to repeat in 12 weeks to ensure this is not from her current infection/inflammation.  dsDNA titer 1280. Normal creatinine.  UA and UPCR with blood, ketones, creatinine, protein. Was on her period for this lab.  Complements not depressed.  COVID negative at Urgent Care    Her lupus seems symptomatically under control so I don't think her current nausea, vomiting, diarrhea are a lupus flare. High CRP almost 200 suggests infection. Maybe this is a medication reaction to azathioprine, but she seemed to tolerate the medication fine for several weeks prior to this.      Plan:  Labs today at [facility]. EBV, CMV, acute hepatitis panel. Vitamin D. Pro-calcitonin.  Schedule CT abdomen in 2 weeks. Looking for lupus related enteritis, peritonitis, pleuritis, etc. She will cancel the CT if her condition improves.  Hold azathioprine until acute illness resolves.  Continue Plaquenil 200 mg twice a day.  Continue  vitamin D supplements up to 1,000 units daily to help with fatigue and joint pain.  Take Arthritis strength extended release Tylenol 650 mg 1 tablet 2 times daily as needed for pain.  Get yearly Plaquenil eye exam.  Lupus monitoring labs and lupus anticoagulant in 3 months with follow up.        High Risk Medication Monitoring encounter  Drug therapy requiring intensive monitoring for toxicity  No current medication related issues, no evidence of toxicity  Appropriate labs ordered for toxicity monitoring    Compromised immune system secondary to autoimmune disease and/or use of immunosuppressive drugs.  Monitor carefully for infections.  Advised patient to get immediate medical care if any infection arises.  Also advised strict adherence age-appropriate vaccinations and cancer screenings with PCP.    Patient advised to hold DMARD and/or biologic therapy for signs of infection or for surgery. If you are unsure what to do please call our office for instruction.[facility] [phone]          Follow up in about 3 months (around 1/18/2025).       I spent a total of 49 minutes on the day of the visit.  This includes face to face time and non-face to face time preparing to see the patient (eg, review of tests), obtaining and/or reviewing separately obtained history, documenting clinical information in the electronic or other health record, independently interpreting results and communicating results to the patient/family/caregiver, or care coordinator.

## 2024-10-21 LAB
CMV IGM SERPL IA-ACNC: <8 AU/ML
EBV EA IGG SER QL IA: NEGATIVE
EBV VCA IGG SER QL IA: NEGATIVE
EBV VCA IGM SER QL IA: NEGATIVE
EPSTEIN-BARR VIRUS IGG, EARLY ANTIGEN: NEGATIVE

## 2024-10-24 ENCOUNTER — PATIENT MESSAGE (OUTPATIENT)
Dept: GASTROENTEROLOGY | Facility: CLINIC | Age: 29
End: 2024-10-24
Payer: COMMERCIAL

## 2024-11-04 ENCOUNTER — PATIENT MESSAGE (OUTPATIENT)
Dept: RHEUMATOLOGY | Facility: CLINIC | Age: 29
End: 2024-11-04
Payer: COMMERCIAL

## 2024-11-19 DIAGNOSIS — Z79.899 HIGH RISK MEDICATION USE: Primary | ICD-10-CM

## 2024-11-22 RX ORDER — HYDROXYCHLOROQUINE SULFATE 200 MG/1
200 TABLET, FILM COATED ORAL 2 TIMES DAILY
Qty: 60 TABLET | Refills: 0 | Status: SHIPPED | OUTPATIENT
Start: 2024-11-22

## 2024-11-22 RX ORDER — HYDROXYCHLOROQUINE SULFATE 200 MG/1
200 TABLET, FILM COATED ORAL 2 TIMES DAILY
Qty: 60 TABLET | Refills: 3 | Status: SHIPPED | OUTPATIENT
Start: 2024-11-22

## 2024-12-23 DIAGNOSIS — M32.19 OTHER SYSTEMIC LUPUS ERYTHEMATOSUS WITH OTHER ORGAN INVOLVEMENT: Primary | ICD-10-CM

## 2024-12-26 RX ORDER — HYDROXYCHLOROQUINE SULFATE 200 MG/1
200 TABLET, FILM COATED ORAL 2 TIMES DAILY
Qty: 60 TABLET | Refills: 3 | Status: SHIPPED | OUTPATIENT
Start: 2024-12-26

## 2025-02-18 ENCOUNTER — LAB VISIT (OUTPATIENT)
Dept: LAB | Facility: HOSPITAL | Age: 30
End: 2025-02-18
Attending: STUDENT IN AN ORGANIZED HEALTH CARE EDUCATION/TRAINING PROGRAM
Payer: COMMERCIAL

## 2025-02-18 DIAGNOSIS — M32.19 OTHER SYSTEMIC LUPUS ERYTHEMATOSUS WITH OTHER ORGAN INVOLVEMENT: ICD-10-CM

## 2025-02-18 DIAGNOSIS — D84.821 IMMUNODEFICIENCY DUE TO DRUG THERAPY: ICD-10-CM

## 2025-02-18 DIAGNOSIS — Z79.899 HIGH RISK MEDICATION USE: ICD-10-CM

## 2025-02-18 DIAGNOSIS — Z79.899 IMMUNODEFICIENCY DUE TO DRUG THERAPY: ICD-10-CM

## 2025-02-18 DIAGNOSIS — Z79.60 ENCOUNTER FOR MONITORING IMMUNOSUPPRESSIVE MEDICATION THERAPY CAUSING IMMUNODEFICIENCY: ICD-10-CM

## 2025-02-18 DIAGNOSIS — Z51.81 ENCOUNTER FOR MONITORING IMMUNOSUPPRESSIVE MEDICATION THERAPY CAUSING IMMUNODEFICIENCY: ICD-10-CM

## 2025-02-18 DIAGNOSIS — D84.821 ENCOUNTER FOR MONITORING IMMUNOSUPPRESSIVE MEDICATION THERAPY CAUSING IMMUNODEFICIENCY: ICD-10-CM

## 2025-02-18 LAB
ALBUMIN SERPL BCP-MCNC: 3.6 G/DL (ref 3.5–5.2)
ALP SERPL-CCNC: 99 U/L (ref 40–150)
ALT SERPL W/O P-5'-P-CCNC: 72 U/L (ref 10–44)
ANION GAP SERPL CALC-SCNC: 14 MMOL/L (ref 8–16)
AST SERPL-CCNC: 39 U/L (ref 10–40)
BASOPHILS # BLD AUTO: 0.1 K/UL (ref 0–0.2)
BASOPHILS NFR BLD: 1.2 % (ref 0–1.9)
BILIRUB SERPL-MCNC: 0.4 MG/DL (ref 0.1–1)
BUN SERPL-MCNC: 11 MG/DL (ref 6–20)
CALCIUM SERPL-MCNC: 9.3 MG/DL (ref 8.7–10.5)
CHLORIDE SERPL-SCNC: 108 MMOL/L (ref 95–110)
CO2 SERPL-SCNC: 18 MMOL/L (ref 23–29)
CREAT SERPL-MCNC: 0.9 MG/DL (ref 0.5–1.4)
CRP SERPL-MCNC: 18.4 MG/L (ref 0–8.2)
DIFFERENTIAL METHOD BLD: NORMAL
EOSINOPHIL # BLD AUTO: 0.2 K/UL (ref 0–0.5)
EOSINOPHIL NFR BLD: 1.8 % (ref 0–8)
ERYTHROCYTE [DISTWIDTH] IN BLOOD BY AUTOMATED COUNT: 12.8 % (ref 11.5–14.5)
ERYTHROCYTE [SEDIMENTATION RATE] IN BLOOD BY PHOTOMETRIC METHOD: 37 MM/HR (ref 0–36)
EST. GFR  (NO RACE VARIABLE): >60 ML/MIN/1.73 M^2
GLUCOSE SERPL-MCNC: 75 MG/DL (ref 70–110)
HCT VFR BLD AUTO: 43.7 % (ref 37–48.5)
HGB BLD-MCNC: 14.1 G/DL (ref 12–16)
IMM GRANULOCYTES # BLD AUTO: 0.02 K/UL (ref 0–0.04)
IMM GRANULOCYTES NFR BLD AUTO: 0.2 % (ref 0–0.5)
LYMPHOCYTES # BLD AUTO: 2.3 K/UL (ref 1–4.8)
LYMPHOCYTES NFR BLD: 28.1 % (ref 18–48)
MCH RBC QN AUTO: 29.7 PG (ref 27–31)
MCHC RBC AUTO-ENTMCNC: 32.3 G/DL (ref 32–36)
MCV RBC AUTO: 92 FL (ref 82–98)
MONOCYTES # BLD AUTO: 0.5 K/UL (ref 0.3–1)
MONOCYTES NFR BLD: 6.4 % (ref 4–15)
NEUTROPHILS # BLD AUTO: 5.1 K/UL (ref 1.8–7.7)
NEUTROPHILS NFR BLD: 62.3 % (ref 38–73)
NRBC BLD-RTO: 0 /100 WBC
PLATELET # BLD AUTO: 260 K/UL (ref 150–450)
PMV BLD AUTO: 11.8 FL (ref 9.2–12.9)
POTASSIUM SERPL-SCNC: 3.9 MMOL/L (ref 3.5–5.1)
PROT SERPL-MCNC: 7.5 G/DL (ref 6–8.4)
RBC # BLD AUTO: 4.75 M/UL (ref 4–5.4)
SODIUM SERPL-SCNC: 140 MMOL/L (ref 136–145)
WBC # BLD AUTO: 8.15 K/UL (ref 3.9–12.7)

## 2025-02-18 PROCEDURE — 86140 C-REACTIVE PROTEIN: CPT | Performed by: STUDENT IN AN ORGANIZED HEALTH CARE EDUCATION/TRAINING PROGRAM

## 2025-02-18 PROCEDURE — 80053 COMPREHEN METABOLIC PANEL: CPT | Performed by: STUDENT IN AN ORGANIZED HEALTH CARE EDUCATION/TRAINING PROGRAM

## 2025-02-18 PROCEDURE — 85652 RBC SED RATE AUTOMATED: CPT | Performed by: STUDENT IN AN ORGANIZED HEALTH CARE EDUCATION/TRAINING PROGRAM

## 2025-02-18 PROCEDURE — 36415 COLL VENOUS BLD VENIPUNCTURE: CPT | Mod: PO | Performed by: STUDENT IN AN ORGANIZED HEALTH CARE EDUCATION/TRAINING PROGRAM

## 2025-02-18 PROCEDURE — 85025 COMPLETE CBC W/AUTO DIFF WBC: CPT | Performed by: STUDENT IN AN ORGANIZED HEALTH CARE EDUCATION/TRAINING PROGRAM

## 2025-02-20 ENCOUNTER — PATIENT MESSAGE (OUTPATIENT)
Dept: RHEUMATOLOGY | Facility: CLINIC | Age: 30
End: 2025-02-20
Payer: COMMERCIAL

## 2025-02-20 ENCOUNTER — LAB VISIT (OUTPATIENT)
Dept: LAB | Facility: HOSPITAL | Age: 30
End: 2025-02-20
Attending: FAMILY MEDICINE
Payer: COMMERCIAL

## 2025-02-20 DIAGNOSIS — Z79.899 IMMUNODEFICIENCY DUE TO DRUG THERAPY: ICD-10-CM

## 2025-02-20 DIAGNOSIS — D84.821 IMMUNODEFICIENCY DUE TO DRUG THERAPY: ICD-10-CM

## 2025-02-20 DIAGNOSIS — Z79.899 HIGH RISK MEDICATION USE: ICD-10-CM

## 2025-02-20 DIAGNOSIS — M32.19 OTHER SYSTEMIC LUPUS ERYTHEMATOSUS WITH OTHER ORGAN INVOLVEMENT: ICD-10-CM

## 2025-02-20 DIAGNOSIS — R74.8 ELEVATED LIVER ENZYMES: ICD-10-CM

## 2025-02-20 DIAGNOSIS — Z79.60 ENCOUNTER FOR MONITORING IMMUNOSUPPRESSIVE MEDICATION THERAPY CAUSING IMMUNODEFICIENCY: ICD-10-CM

## 2025-02-20 DIAGNOSIS — R11.2 NAUSEA AND VOMITING, UNSPECIFIED VOMITING TYPE: ICD-10-CM

## 2025-02-20 DIAGNOSIS — D84.821 ENCOUNTER FOR MONITORING IMMUNOSUPPRESSIVE MEDICATION THERAPY CAUSING IMMUNODEFICIENCY: ICD-10-CM

## 2025-02-20 DIAGNOSIS — Z51.81 ENCOUNTER FOR MONITORING IMMUNOSUPPRESSIVE MEDICATION THERAPY CAUSING IMMUNODEFICIENCY: ICD-10-CM

## 2025-02-20 LAB
C3 SERPL-MCNC: 190 MG/DL (ref 50–180)
C4 SERPL-MCNC: 45 MG/DL (ref 11–44)
CREAT UR-MCNC: 192 MG/DL (ref 15–325)
HAV IGM SERPL QL IA: NORMAL
HBV CORE IGM SERPL QL IA: NORMAL
HBV SURFACE AG SERPL QL IA: NORMAL
HCV AB SERPL QL IA: NORMAL
PROT UR-MCNC: 12 MG/DL (ref 0–15)
PROT/CREAT UR: 0.06 MG/G{CREAT} (ref 0–0.2)

## 2025-02-20 PROCEDURE — 85610 PROTHROMBIN TIME: CPT | Performed by: STUDENT IN AN ORGANIZED HEALTH CARE EDUCATION/TRAINING PROGRAM

## 2025-02-20 PROCEDURE — 82570 ASSAY OF URINE CREATININE: CPT | Performed by: STUDENT IN AN ORGANIZED HEALTH CARE EDUCATION/TRAINING PROGRAM

## 2025-02-20 PROCEDURE — 86160 COMPLEMENT ANTIGEN: CPT | Performed by: STUDENT IN AN ORGANIZED HEALTH CARE EDUCATION/TRAINING PROGRAM

## 2025-02-20 PROCEDURE — 36415 COLL VENOUS BLD VENIPUNCTURE: CPT | Mod: PO | Performed by: STUDENT IN AN ORGANIZED HEALTH CARE EDUCATION/TRAINING PROGRAM

## 2025-02-20 PROCEDURE — 86225 DNA ANTIBODY NATIVE: CPT | Performed by: STUDENT IN AN ORGANIZED HEALTH CARE EDUCATION/TRAINING PROGRAM

## 2025-02-20 PROCEDURE — 81001 URINALYSIS AUTO W/SCOPE: CPT | Performed by: STUDENT IN AN ORGANIZED HEALTH CARE EDUCATION/TRAINING PROGRAM

## 2025-02-20 PROCEDURE — 80074 ACUTE HEPATITIS PANEL: CPT | Performed by: STUDENT IN AN ORGANIZED HEALTH CARE EDUCATION/TRAINING PROGRAM

## 2025-02-20 PROCEDURE — 86160 COMPLEMENT ANTIGEN: CPT | Mod: 59 | Performed by: STUDENT IN AN ORGANIZED HEALTH CARE EDUCATION/TRAINING PROGRAM

## 2025-02-20 PROCEDURE — 86225 DNA ANTIBODY NATIVE: CPT | Mod: 59 | Performed by: STUDENT IN AN ORGANIZED HEALTH CARE EDUCATION/TRAINING PROGRAM

## 2025-02-21 LAB
AMORPH CRY UR QL COMP ASSIST: NORMAL
BACTERIA #/AREA URNS AUTO: NORMAL /HPF
BILIRUB UR QL STRIP: NEGATIVE
CLARITY UR REFRACT.AUTO: ABNORMAL
COLOR UR AUTO: ABNORMAL
GLUCOSE UR QL STRIP: NEGATIVE
HGB UR QL STRIP: NEGATIVE
KETONES UR QL STRIP: NEGATIVE
LEUKOCYTE ESTERASE UR QL STRIP: NEGATIVE
MICROSCOPIC COMMENT: NORMAL
NITRITE UR QL STRIP: NEGATIVE
PH UR STRIP: 6 [PH] (ref 5–8)
PROT UR QL STRIP: ABNORMAL
RBC #/AREA URNS AUTO: 3 /HPF (ref 0–4)
SP GR UR STRIP: >1.03 (ref 1–1.03)
URN SPEC COLLECT METH UR: ABNORMAL
WBC #/AREA URNS AUTO: 2 /HPF (ref 0–5)

## 2025-02-24 LAB
CONFIRM DRVVT STA-STACLOT: NORMAL S
DRVVT SCREEN TO CONFIRM RATIO: NORMAL {RATIO}
HEPARIN NT PPP QL: NORMAL
LA 3 SCREEN W REFLEX-IMP: NORMAL
LMW HEPARIN IND PLT AB SER QL: NORMAL
MIXING DRVVT/NORMAL: NORMAL %
NEUTRALIZED DRVVT SCREEN RATIO: NORMAL
PROTHROMBIN TIME: 12.7 S (ref 12–15.5)
SCREEN APTT/NORMAL: 1.05
SCREEN APTT/NORMAL: NORMAL
SCREEN DRVVT/NORMAL: 1.05 %
THROMBIN TIME: NORMAL S

## 2025-02-25 LAB
DNA TITER: NORMAL
DSDNA AB SER-ACNC: POSITIVE [IU]/ML

## 2025-02-27 ENCOUNTER — OFFICE VISIT (OUTPATIENT)
Dept: RHEUMATOLOGY | Facility: CLINIC | Age: 30
End: 2025-02-27
Payer: COMMERCIAL

## 2025-02-27 DIAGNOSIS — M32.19 OTHER SYSTEMIC LUPUS ERYTHEMATOSUS WITH OTHER ORGAN INVOLVEMENT: Primary | ICD-10-CM

## 2025-02-27 DIAGNOSIS — Z79.60 ENCOUNTER FOR MONITORING IMMUNOSUPPRESSIVE MEDICATION THERAPY CAUSING IMMUNODEFICIENCY: ICD-10-CM

## 2025-02-27 DIAGNOSIS — Z79.899 HIGH RISK MEDICATION USE: ICD-10-CM

## 2025-02-27 DIAGNOSIS — Z79.899 LONG-TERM USE OF PLAQUENIL: ICD-10-CM

## 2025-02-27 DIAGNOSIS — Z79.899 IMMUNODEFICIENCY DUE TO DRUG THERAPY: ICD-10-CM

## 2025-02-27 DIAGNOSIS — D84.821 ENCOUNTER FOR MONITORING IMMUNOSUPPRESSIVE MEDICATION THERAPY CAUSING IMMUNODEFICIENCY: ICD-10-CM

## 2025-02-27 DIAGNOSIS — D84.821 IMMUNODEFICIENCY DUE TO DRUG THERAPY: ICD-10-CM

## 2025-02-27 DIAGNOSIS — Z51.81 ENCOUNTER FOR MONITORING IMMUNOSUPPRESSIVE MEDICATION THERAPY CAUSING IMMUNODEFICIENCY: ICD-10-CM

## 2025-02-27 NOTE — PROGRESS NOTES
The patient location is: [place]  The chief complaint leading to consultation is: SLE    Visit type: audiovisual    Face to Face time with patient: 15 minutes  30 minutes of total time spent on the encounter, which includes face to face time and non-face to face time preparing to see the patient (eg, review of tests), Obtaining and/or reviewing separately obtained history, Documenting clinical information in the electronic or other health record, Independently interpreting results (not separately reported) and communicating results to the patient/family/caregiver, or Care coordination (not separately reported).         Each patient to whom he or she provides medical services by telemedicine is:  (1) informed of the relationship between the physician and patient and the respective role of any other health care provider with respect to management of the patient; and (2) notified that he or she may decline to receive medical services by telemedicine and may withdraw from such care at any time.    Notes:       Subjective:      Patient ID: Charlotte Patricia is a 30 y.o. female.    Chief Complaint: SLE follow up    HPI:   Patient presents for Rheumatology follow up for lupus with positive BUSTER, dsDNA, photosensitive rashes, sicca symptoms, and joint pain and stiffness. She is on Plaquenil 200 mg BID. She reports being stable today. Hands ache and are stiff in the morning for <1 hour. No joint swelling. Dryness on right forearm but no current photosensitive rashes. More fatigue in past few months. Not exercising like she used to. She wants to get pregnant in the next year and is worried about her high antibody levels. Last year we stopped Benlysta and started Imuran but she got very sick with Imuran (GI symptoms, malaise, elevated inflammatory markers). Denies joint swelling, significant stiffness, skin rashes, oral ulcers, patchy alopecia, cardiopulmonary symptoms, eye inflammation, IBD, fevers, weight loss,  Raynaud's. Rheumatology review of systems is otherwise negative.    Social Hx: Never smoker. Rare alcohol use.  Family Hx: Mother has fibromyalgia. No family history of autoimmune disease      Objective:   There were no vitals taken for this visit.  Physical Exam  Constitutional:       General: She is not in acute distress.     Appearance: Normal appearance.             Assessment and Plan:     Problem List Items Addressed This Visit    None  Visit Diagnoses         Other systemic lupus erythematosus with other organ involvement    -  Primary    Relevant Orders    C3 Complement    Anti-DNA Ab, Double-Stranded    C4 Complement    CBC Auto Differential    Comprehensive Metabolic Panel    C-Reactive Protein    Protein/Creatinine Ratio, Urine    Sedimentation rate    Urinalysis      High risk medication use          Immunodeficiency due to drug therapy          Long-term use of Plaquenil          Encounter for monitoring immunosuppressive medication therapy causing immunodeficiency                  Patient presents for Rheumatology follow up for lupus.    SLE  Manifestations are photosensitive rash, sicca symptoms, joint pain, and stiffness.    BUSTER+ >1:2560 homogenous and >1:2560 speckled.  dsDNA+ >1:5120  SSB+    APS labs: initially LA positive but 12-week repeat was negative. Cardiolipin and B2GP negative.    Elevated ALT and AST. Stable. Has seen Hepatology and had fibroscan. Diagnosed with NAFLD. Smooth musc and choco negative.    Complements not depressed.  No proteinuria or hematuria.    Previous medications:  Benysta - ineffective for arthralgias. Stopped because she is considering pregnancy.  Imuran - intolerance (GI upset, malaise)    Symptomatically lupus is doing well. Some chronic symptoms of fatigue and aching in the hands. Being more sedentary is making these worse. She has high titer dsDNA antibody but no signs of end organ damage. She would like to get pregnant in the next year.      Plan:  Continue  hydroxychloroquine 200 mg BID  Long term use of Plaquenil. Advised yearly Plaquenil toxicity eye exams.  Vitamin D 1000 IU daily to help with fatigue and pain.  Safety and disease monitoring labs every 3 months.        High Risk Medication Monitoring encounter  Drug therapy requiring intensive monitoring for toxicity  No current medication related issues, no evidence of toxicity  Appropriate labs ordered for toxicity monitoring    Compromised immune system secondary to autoimmune disease and/or use of immunosuppressive drugs.  Monitor carefully for infections.  Advised patient to get immediate medical care if any infection arises.  Also advised strict adherence age-appropriate vaccinations and cancer screenings with PCP.    Patient advised to hold DMARD and/or biologic therapy for signs of infection or for surgery. If you are unsure what to do please call our office for instruction.[facility] [phone]          Follow up in about 3 months (around 5/27/2025).       I spent a total of 30 minutes on the day of the visit.  This includes face to face time and non-face to face time preparing to see the patient (eg, review of tests), obtaining and/or reviewing separately obtained history, documenting clinical information in the electronic or other health record, independently interpreting results and communicating results to the patient/family/caregiver, or care coordinator.        Today's visit is associated with current or anticipated ongoing medical care related to this patient's diagnosis of lupus, which is a chronic disease which will require regular follow up to manage symptoms and progression. The patient is to return to the office within a minimum of 3-6 months to review symptoms and function at that time. CPT code

## 2025-02-28 ENCOUNTER — PATIENT MESSAGE (OUTPATIENT)
Dept: RHEUMATOLOGY | Facility: CLINIC | Age: 30
End: 2025-02-28
Payer: COMMERCIAL

## 2025-03-13 ENCOUNTER — PATIENT MESSAGE (OUTPATIENT)
Dept: RHEUMATOLOGY | Facility: CLINIC | Age: 30
End: 2025-03-13
Payer: COMMERCIAL

## 2025-03-14 ENCOUNTER — TELEPHONE (OUTPATIENT)
Dept: RHEUMATOLOGY | Facility: CLINIC | Age: 30
End: 2025-03-14
Payer: COMMERCIAL

## 2025-03-14 ENCOUNTER — PATIENT MESSAGE (OUTPATIENT)
Dept: RHEUMATOLOGY | Facility: CLINIC | Age: 30
End: 2025-03-14
Payer: COMMERCIAL

## 2025-03-14 DIAGNOSIS — M32.19 OTHER SYSTEMIC LUPUS ERYTHEMATOSUS WITH OTHER ORGAN INVOLVEMENT: Primary | ICD-10-CM

## 2025-03-14 NOTE — TELEPHONE ENCOUNTER
"Informed patient,         "We understand that you recently received notice of Dr. Bruner departure on Mildred 3, 2025, and may have questions about continuing your care. For the best continuity of your treatment, we strongly recommend establishing care with an external rheumatologist within the next 90 days.    At this time, our two other providers are unable to accept new patients. However, we are happy to assist you with accessing or retrieving your medical records (at any time), processing prescription refills (through Mildred 3), and addressing any other questions you may have."    [facility]  [facility] [place]    Patient verbalized understanding and was grateful for the call    -Danyelle  "

## 2025-03-27 ENCOUNTER — TELEPHONE (OUTPATIENT)
Dept: RHEUMATOLOGY | Facility: CLINIC | Age: 30
End: 2025-03-27
Payer: COMMERCIAL

## 2025-03-27 NOTE — TELEPHONE ENCOUNTER
Message left on voicemail letting her know that after June 3rd Dr. Mott will no longer with [facility] . We are referring [facility]. You need to call your insurance to see who they cover. Call us after you get the information and we will send over the referral.

## 2025-07-28 ENCOUNTER — OFFICE VISIT (OUTPATIENT)
Dept: PRIMARY CARE CLINIC | Facility: CLINIC | Age: 30
End: 2025-07-28
Payer: COMMERCIAL

## 2025-07-28 ENCOUNTER — LAB VISIT (OUTPATIENT)
Dept: LAB | Facility: HOSPITAL | Age: 30
End: 2025-07-28
Attending: FAMILY MEDICINE
Payer: COMMERCIAL

## 2025-07-28 VITALS
SYSTOLIC BLOOD PRESSURE: 116 MMHG | WEIGHT: 227.5 LBS | DIASTOLIC BLOOD PRESSURE: 72 MMHG | OXYGEN SATURATION: 98 % | HEART RATE: 95 BPM | BODY MASS INDEX: 36.56 KG/M2 | TEMPERATURE: 97 F | RESPIRATION RATE: 18 BRPM | HEIGHT: 66 IN

## 2025-07-28 DIAGNOSIS — R73.03 PREDIABETES: ICD-10-CM

## 2025-07-28 DIAGNOSIS — R63.5 ABNORMAL WEIGHT GAIN: ICD-10-CM

## 2025-07-28 DIAGNOSIS — F41.9 ANXIETY: ICD-10-CM

## 2025-07-28 DIAGNOSIS — Z00.00 ROUTINE GENERAL MEDICAL EXAMINATION AT A HEALTH CARE FACILITY: ICD-10-CM

## 2025-07-28 DIAGNOSIS — R41.840 ATTENTION OR CONCENTRATION DEFICIT: Primary | ICD-10-CM

## 2025-07-28 DIAGNOSIS — F32.A DEPRESSION, UNSPECIFIED DEPRESSION TYPE: ICD-10-CM

## 2025-07-28 PROBLEM — M32.8 OTHER FORMS OF SYSTEMIC LUPUS ERYTHEMATOSUS: Status: ACTIVE | Noted: 2025-06-17

## 2025-07-28 LAB
ALBUMIN SERPL BCP-MCNC: 3.8 G/DL (ref 3.5–5.2)
ALP SERPL-CCNC: 81 UNIT/L (ref 40–150)
ALT SERPL W/O P-5'-P-CCNC: 50 UNIT/L (ref 0–55)
ANION GAP (OHS): 11 MMOL/L (ref 8–16)
AST SERPL-CCNC: 34 UNIT/L (ref 0–50)
BILIRUB SERPL-MCNC: 0.4 MG/DL (ref 0.1–1)
BUN SERPL-MCNC: 12 MG/DL (ref 6–20)
CALCIUM SERPL-MCNC: 9.1 MG/DL (ref 8.7–10.5)
CHLORIDE SERPL-SCNC: 108 MMOL/L (ref 95–110)
CHOLEST SERPL-MCNC: 205 MG/DL (ref 120–199)
CHOLEST/HDLC SERPL: 4.3 {RATIO} (ref 2–5)
CO2 SERPL-SCNC: 21 MMOL/L (ref 23–29)
CREAT SERPL-MCNC: 1 MG/DL (ref 0.5–1.4)
EAG (OHS): 108 MG/DL (ref 68–131)
ERYTHROCYTE [DISTWIDTH] IN BLOOD BY AUTOMATED COUNT: 12.3 % (ref 11.5–14.5)
GFR SERPLBLD CREATININE-BSD FMLA CKD-EPI: >60 ML/MIN/1.73/M2
GLUCOSE SERPL-MCNC: 87 MG/DL (ref 70–110)
HBA1C MFR BLD: 5.4 % (ref 4–5.6)
HCT VFR BLD AUTO: 43.2 % (ref 37–48.5)
HDLC SERPL-MCNC: 48 MG/DL (ref 40–75)
HDLC SERPL: 23.4 % (ref 20–50)
HGB BLD-MCNC: 14.1 GM/DL (ref 12–16)
LDLC SERPL CALC-MCNC: 128.4 MG/DL (ref 63–159)
MCH RBC QN AUTO: 29.6 PG (ref 27–31)
MCHC RBC AUTO-ENTMCNC: 32.6 G/DL (ref 32–36)
MCV RBC AUTO: 91 FL (ref 82–98)
NONHDLC SERPL-MCNC: 157 MG/DL
PLATELET # BLD AUTO: 263 K/UL (ref 150–450)
PMV BLD AUTO: 12.1 FL (ref 9.2–12.9)
POTASSIUM SERPL-SCNC: 4.6 MMOL/L (ref 3.5–5.1)
PROT SERPL-MCNC: 7.7 GM/DL (ref 6–8.4)
RBC # BLD AUTO: 4.77 M/UL (ref 4–5.4)
SODIUM SERPL-SCNC: 140 MMOL/L (ref 136–145)
T4 FREE SERPL-MCNC: 0.91 NG/DL (ref 0.71–1.51)
TRIGL SERPL-MCNC: 143 MG/DL (ref 30–150)
TSH SERPL-ACNC: 0.76 UIU/ML (ref 0.4–4)
WBC # BLD AUTO: 7.5 K/UL (ref 3.9–12.7)

## 2025-07-28 PROCEDURE — 80053 COMPREHEN METABOLIC PANEL: CPT

## 2025-07-28 PROCEDURE — G2211 COMPLEX E/M VISIT ADD ON: HCPCS | Mod: S$GLB,,, | Performed by: FAMILY MEDICINE

## 2025-07-28 PROCEDURE — 1160F RVW MEDS BY RX/DR IN RCRD: CPT | Mod: CPTII,S$GLB,, | Performed by: FAMILY MEDICINE

## 2025-07-28 PROCEDURE — 3008F BODY MASS INDEX DOCD: CPT | Mod: CPTII,S$GLB,, | Performed by: FAMILY MEDICINE

## 2025-07-28 PROCEDURE — 36415 COLL VENOUS BLD VENIPUNCTURE: CPT | Mod: PN

## 2025-07-28 PROCEDURE — 80061 LIPID PANEL: CPT

## 2025-07-28 PROCEDURE — 84439 ASSAY OF FREE THYROXINE: CPT

## 2025-07-28 PROCEDURE — 83036 HEMOGLOBIN GLYCOSYLATED A1C: CPT

## 2025-07-28 PROCEDURE — 99214 OFFICE O/P EST MOD 30 MIN: CPT | Mod: S$GLB,,, | Performed by: FAMILY MEDICINE

## 2025-07-28 PROCEDURE — 99999 PR PBB SHADOW E&M-EST. PATIENT-LVL IV: CPT | Mod: PBBFAC,,, | Performed by: FAMILY MEDICINE

## 2025-07-28 PROCEDURE — 3078F DIAST BP <80 MM HG: CPT | Mod: CPTII,S$GLB,, | Performed by: FAMILY MEDICINE

## 2025-07-28 PROCEDURE — 3074F SYST BP LT 130 MM HG: CPT | Mod: CPTII,S$GLB,, | Performed by: FAMILY MEDICINE

## 2025-07-28 PROCEDURE — 85027 COMPLETE CBC AUTOMATED: CPT

## 2025-07-28 PROCEDURE — 84443 ASSAY THYROID STIM HORMONE: CPT

## 2025-07-28 PROCEDURE — 1159F MED LIST DOCD IN RCRD: CPT | Mod: CPTII,S$GLB,, | Performed by: FAMILY MEDICINE

## 2025-07-28 RX ORDER — ATOMOXETINE 40 MG/1
40 CAPSULE ORAL DAILY
COMMUNITY
Start: 2025-04-09 | End: 2025-07-28 | Stop reason: SINTOL

## 2025-07-28 RX ORDER — FLUOXETINE 10 MG/1
10 CAPSULE ORAL DAILY
Qty: 30 CAPSULE | Refills: 11 | Status: SHIPPED | OUTPATIENT
Start: 2025-07-28 | End: 2026-07-28

## 2025-07-28 NOTE — PROGRESS NOTES
Chief Complaint  Chief Complaint   Patient presents with    Follow-up     6 month        HPI  Charlotte Patricia is a 30 y.o. female with multiple medical diagnoses as listed in the medical history and problem list that presents for  in person visit.     History of Present Illness    CHIEF COMPLAINT:  - Patient presents for a six-month follow-up visit to discuss ongoing management of lupus and to address concerns about attention, focus, and mood.    HPI:  Patient is a 30-year-old female presenting for a six-month follow-up visit. She has been diagnosed with lupus and has been on long-term use of Plaquenil for photosensitivity. She recently switched rheumatologists from Dr. Mott to Dr. Thomas at [facility] for management of her lupus. Her gynecologist, Dr. Steele, recently evaluated her for a pap smear, which was reported as normal.    She reports ongoing issues with attention, focus, and mood, describing difficulty concentrating, lack of energy to make desired changes, and feeling unmotivated for the past few months. She has been seeing a therapist, Christa Lovell, for over 2 years to help with anxiety issues. She has tried several medications for depression and attention issues over the past year, including Wellbutrin, Zoloft, and Lexapro, but discontinued them due to lack of efficacy or side effects. She most recently tried Strattera (atomoxefin) for potential ADHD, but stopped taking it due to severe side effects including painful and bleeding bowel movements, which she interpreted as possibly hemorrhoids. She subsequently stopped taking all medications.    She reports weight gain over the past few months, noting an increase from a low of 215 lbs in October to around 227 lbs currently. She expresses frustration at her inability to motivate herself to exercise, noting that even daily walking would be beneficial. She also mentions increased stress at work recently.    She has a history of elevated liver enzymes, with  "her last labs done in February. She denies any formal testing for ADD/ADHD.    She denies any new or worsening symptoms related to her lupus diagnosis.    MEDICATIONS:  - Plaquenil, long-term use, for lupus and photosensitivity  - Discontinued Wellbutrin, tried in the past for depression  - Discontinued Zoloft, tried in the past for depression  - Discontinued Lexapro, tried in the past for depression  - Discontinued Strattera (atomoxetine), tried for ADD, ended due to side effects including rectal bleeding and pain during bowel movements    PMH:  - Lupus: Diagnosed within the last 6 months  - Photosensitivity: Associated with lupus diagnosis  - Elevated liver enzymes: In the past  - Anxiety  - Depression  - Possible ADHD: Not formally diagnosed  - Last Pap: Last week. Normal         No questionnaires on file.       Pmh, Psh, Family Hx, Social Hx, HM updated in Epic Tabs today.    Review of Systems   Constitutional:  Positive for fatigue and unexpected weight change. Negative for activity change and appetite change.   Respiratory:  Negative for cough and shortness of breath.    Cardiovascular:  Negative for chest pain and palpitations.   Gastrointestinal:  Negative for abdominal distention and abdominal pain.   Psychiatric/Behavioral:  Positive for decreased concentration. Negative for dysphoric mood and sleep disturbance. The patient is nervous/anxious.         Objective:     Vitals:    07/28/25 0922   BP: 116/72   BP Location: Left arm   Patient Position: Sitting   Pulse: 95   Resp: 18   Temp: 97.4 °F (36.3 °C)   TempSrc: Tympanic   SpO2: 98%   Weight: 103.2 kg (227 lb 8.2 oz)   Height: 5' 6" (1.676 m)     Wt Readings from Last 10 Encounters:   07/28/25 103.2 kg (227 lb 8.2 oz)   07/15/25 103.4 kg (228 lb)   10/10/24 97.8 kg (215 lb 9.8 oz)   08/12/24 99.2 kg (218 lb 9.4 oz)   07/15/24 97.3 kg (214 lb 8.1 oz)   06/04/24 97.1 kg (214 lb)   04/16/24 99.2 kg (218 lb 11.1 oz)   03/11/24 100.3 kg (221 lb 1.9 oz) "   03/07/24 100 kg (220 lb 7.4 oz)   11/10/23 99.7 kg (219 lb 12.8 oz)     Physical Exam    Vitals: Weight: 227 lbs.  TEST RESULTS:  - Liver enzymes: Elevated in the past  - Labs: Last completed in February  - Cholesterol: Checked in August  - A1C: Checked in August, patient was pre-diabetic at some point but improved  - Pap smear: Completed recently, normal results       Physical Exam  Vitals and nursing note reviewed.   Constitutional:       General: She is awake.      Appearance: Normal appearance. She is well-developed and well-groomed. She is obese.   HENT:      Head: Normocephalic and atraumatic.      Right Ear: Tympanic membrane and external ear normal.      Left Ear: Tympanic membrane and external ear normal.      Nose: Nose normal.   Eyes:      Conjunctiva/sclera: Conjunctivae normal.   Neck:      Thyroid: No thyromegaly or thyroid tenderness.   Cardiovascular:      Rate and Rhythm: Normal rate and regular rhythm.      Heart sounds: Normal heart sounds.   Pulmonary:      Effort: Pulmonary effort is normal. No accessory muscle usage.      Breath sounds: Normal breath sounds.   Musculoskeletal:      Cervical back: Normal range of motion and neck supple.   Neurological:      General: No focal deficit present.      Mental Status: She is alert. Mental status is at baseline.   Psychiatric:         Attention and Perception: Attention normal.         Mood and Affect: Mood normal.         Speech: Speech normal.         Behavior: Behavior normal. Behavior is cooperative.         Thought Content: Thought content normal.         Judgment: Judgment normal.         Assessment:   LABS:   Lab Results   Component Value Date    HGBA1C 5.5 08/12/2024    HGBA1C 5.8 (H) 10/03/2023    HGBA1C 5.6 06/13/2023      Lab Results   Component Value Date    CHOL 178 08/12/2024    CHOL 199 08/23/2023    CHOL 230 (H) 06/13/2023     Lab Results   Component Value Date    LDLCALC 104.0 08/12/2024    LDLCALC 122.2 08/23/2023    LDLCALC 147.6  06/13/2023     Lab Results   Component Value Date    WBC 8.15 02/18/2025    HGB 14.1 02/18/2025    HCT 43.7 02/18/2025     02/18/2025    CHOL 178 08/12/2024    TRIG 145 08/12/2024    HDL 45 08/12/2024    ALT 72 (H) 02/18/2025    AST 39 02/18/2025     02/18/2025    K 3.9 02/18/2025     02/18/2025    CREATININE 0.9 02/18/2025    BUN 11 02/18/2025    CO2 18 (L) 02/18/2025    TSH 0.938 08/12/2024    INR 0.9 06/27/2023    HGBA1C 5.5 08/12/2024       Plan:   1. Attention or concentration deficit  -     Ambulatory referral/consult to Psychology; Future; Expected date: 08/04/2025    2. Prediabetes  -     Hemoglobin A1C; Future; Expected date: 07/28/2025    3. Anxiety  -     Ambulatory referral/consult to Psychology; Future; Expected date: 08/04/2025  -     FLUoxetine 10 MG capsule; Take 1 capsule (10 mg total) by mouth once daily.  Dispense: 30 capsule; Refill: 11    4. Depression, unspecified depression type  -     Ambulatory referral/consult to Psychology; Future; Expected date: 08/04/2025  -     FLUoxetine 10 MG capsule; Take 1 capsule (10 mg total) by mouth once daily.  Dispense: 30 capsule; Refill: 11    5. Routine general medical examination at a health care facility  -     Hemoglobin A1C; Future; Expected date: 07/28/2025  -     CBC Without Differential; Future; Expected date: 07/28/2025  -     Comprehensive Metabolic Panel; Future; Expected date: 07/28/2025  -     TSH; Future; Expected date: 07/28/2025  -     T4, Free; Future; Expected date: 07/28/2025  -     Lipid Panel; Future; Expected date: 07/28/2025    6. Abnormal weight gain  -     Hemoglobin A1C; Future; Expected date: 07/28/2025  -     CBC Without Differential; Future; Expected date: 07/28/2025  -     Comprehensive Metabolic Panel; Future; Expected date: 07/28/2025  -     TSH; Future; Expected date: 07/28/2025  -     T4, Free; Future; Expected date: 07/28/2025      Assessment & Plan    R73.03 Prediabetes  F41.9 Anxiety  R41.840 Attention  or concentration deficit  F32.A Depression, unspecified depression type  Z00.00 Routine general medical exam at a health care facility  R63.5 Abnormal weight gain    IMPRESSION:  Considered history of lupus, recent diagnosis, and ongoing management with rheumatology.  Evaluated mental health concerns, including anxiety and potential ADHD.  Assessed previous trials of antidepressants and non-stimulant ADHD medications.  Started low-dose Prozac for anxiety and depression management.  Discussed potential future considerations for weight management, including GLP-1 agonists or phentermine, pending lab results and response to Prozac.  Recommend formal ADHD testing before considering stimulant therapy due to potential risks and standard policy.  Discontinued Strattera due to side effects (rectal bleeding, pain with bowel movements).    PLAN SUMMARY:  - Order thyroid function test, lipid panel, and A1C test  - Refer to [facility] for formal ADHD evaluation  - Start low-dose Prozac for anxiety and depression management  - Discontinue Strattera due to side effects  - Patient to engage in daily walking for overall health improvement  - Follow up in 1 month to:  - - Discuss potential weight management medications  - - Assess response to Prozac  - - Video visit option available  - - May see NP for follow-up    PREDIABETES:  - Explained the impact of chronic illness diagnosis on mental health.  - Ordered A1C test.    ANXIETY:  - Started low-dose Prozac for anxiety and depression management.  - Follow up in 1 month to assess response to Prozac.    ATTENTION OR CONCENTRATION DEFICIT:  - Discussed the relationship between anxiety, depression, and attention/focus issues.  - Provided information on the process and benefits of formal ADHD testing.  - Referred to [facility] for formal ADHD evaluation.  - Discontinued Strattera due to side effects (rectal bleeding, pain with bowel movements).    DEPRESSION,  UNSPECIFIED DEPRESSION TYPE:  - Explained the impact of chronic illness diagnosis on mental health.  - Started low-dose Prozac for anxiety and depression management, which was also prescribed for anxiety.    ROUTINE GENERAL MEDICAL EXAMINATION AT A HEALTH CARE FACILITY:  - Ordered thyroid function test and lipid panel.  - Follow up in 1 month.  - Video visit option available for follow-up visit.  - May see NP for follow-up visit.    ABNORMAL WEIGHT GAIN:  - Explained the rationale for starting with a low-dose antidepressant before considering weight loss medications.  - Follow up in 1 month to discuss potential weight management medications.    LIFESTYLE CHANGES:  - Patient to engage in daily walking to improve overall health and potentially aid in weight management.           X-Ray Chest PA And Lateral  Narrative: EXAM:  XR CHEST PA AND LATERAL    CLINICAL HISTORY:   Shortness of breath.    2 views of the chest.    FINDINGS: The lungs are clear.  The cardiac silhouette and mediastinum are within normal limits.  Osseous structures and soft tissues are within normal limits.  Impression:   No acute chest findings.    Finalized on: 7/10/2023 1:05 PM By:  Dale Salmon MD  [facility]# [id number]      2023-07-10 13:07:[id number]    [facility]    The ASCVD Risk score (Hamburg DK, et al., 2019) failed to calculate for the following reasons:    The 2019 ASCVD risk score is only valid for ages 40 to 79    Follow-up: Follow up in about 1 month (around 8/28/2025) for VV Karin Ambrocio,  NP- 1 mo, f/u prozac, discuss adipex vs glp .    I spent a total of    30   minutes face to face and non-face to face on the date of this visit.This includes time preparing to see the patient (eg, review of tests, notes), obtaining and/or reviewing additional history from an independent historian and/or outside medical records, documenting clinical information in the electronic health record, independently interpreting results and/or communicating results to the  patient/family/caregiver, or care coordinator.  Visit today included increased complexity associated with the care of the episodic problem addressed and managing the longitudinal care of the patient due to the serious and/or complex managed problem(s).    This note was generated with the assistance of ambient listening technology. Verbal consent was obtained by the patient and accompanying visitor(s) for the recording of patient appointment to facilitate this note. I attest to having reviewed and edited the generated note for accuracy, though some syntax or spelling errors may persist. Please contact the author of this note for any clarification.       There are no Patient Instructions on file for this visit.

## 2025-08-28 ENCOUNTER — OFFICE VISIT (OUTPATIENT)
Dept: PRIMARY CARE CLINIC | Facility: CLINIC | Age: 30
End: 2025-08-28
Payer: COMMERCIAL

## 2025-08-28 VITALS — BODY MASS INDEX: 36.56 KG/M2 | WEIGHT: 227.5 LBS | HEIGHT: 66 IN

## 2025-08-28 DIAGNOSIS — F41.9 ANXIETY: Primary | ICD-10-CM

## 2025-08-28 DIAGNOSIS — F32.A DEPRESSION, UNSPECIFIED DEPRESSION TYPE: ICD-10-CM

## 2025-08-28 RX ORDER — FLUOXETINE 20 MG/1
20 CAPSULE ORAL DAILY
Qty: 90 CAPSULE | Refills: 3 | Status: SHIPPED | OUTPATIENT
Start: 2025-08-28 | End: 2026-08-28

## (undated) DEVICE — TOWEL OR DISP STRL BLUE 4/PK

## (undated) DEVICE — SUT 4-0 CHROMIC GUT / SH

## (undated) DEVICE — SPONGE PATTY SURGICAL .5X3IN

## (undated) DEVICE — NDL SAFETY 25G X 1.5 ECLIPSE

## (undated) DEVICE — POSITIONER HEAD DONUT 9IN FOAM

## (undated) DEVICE — TUBING SUCTION STRAIGHT .25X20

## (undated) DEVICE — DRAPE ABDOMINAL TIBURON 14X11

## (undated) DEVICE — SYR 10CC LUER LOCK

## (undated) DEVICE — DRAPE ARM DAVINCI XI

## (undated) DEVICE — CLOSURE SKIN STERI STRIP 1/2X4

## (undated) DEVICE — SEE MEDLINE ITEM 157166

## (undated) DEVICE — UNDERGLOVES BIOGEL PI SZ 7 LF

## (undated) DEVICE — SEE MEDLINE ITEM 157117

## (undated) DEVICE — SUT MONOCRYL 4.0 PS2 CP496G

## (undated) DEVICE — APPLICATOR CHLORAPREP ORN 26ML

## (undated) DEVICE — MANIFOLD 4 PORT

## (undated) DEVICE — ADHESIVE DERMABOND ADVANCED

## (undated) DEVICE — GOWN SMARTGOWN LVL4 X-LONG XL

## (undated) DEVICE — SEE MEDLINE ITEM 157027

## (undated) DEVICE — SUT 5/0 18IN PLAIN FAST AB

## (undated) DEVICE — SUPPORT ULNA NERVE PROTECTOR

## (undated) DEVICE — SYR B-D DISP CONTROL 10CC100/C

## (undated) DEVICE — SEE MEDLINE ITEM 157131

## (undated) DEVICE — SPLINT NASAL AIRWAY SEPTAL SIL

## (undated) DEVICE — ELECTRODE REM PLYHSV RETURN 9

## (undated) DEVICE — DRESSING TRANS 2X2 TEGADERM

## (undated) DEVICE — KIT ANTIFOG

## (undated) DEVICE — GLOVE SURG BIOGEL LTX PF SZ 8

## (undated) DEVICE — SEAL UNIVERSAL 5MM-8MM XI

## (undated) DEVICE — SYR 3CC LUER LOC

## (undated) DEVICE — Device

## (undated) DEVICE — GLOVE SURGICAL LATEX SZ 7

## (undated) DEVICE — SUT PROLENE 3-0 SH DA 36 BL

## (undated) DEVICE — OBTURATOR BLADELESS 8MM XI CLR

## (undated) DEVICE — NDL HYPO 27G X 1 1/2

## (undated) DEVICE — DRAPE COLUMN DAVINCI XI

## (undated) DEVICE — CLIPPER BLADE MOD 4406 (CAREF)

## (undated) DEVICE — COVER TIP CURVED SCISSORS XI

## (undated) DEVICE — CLIP HEMO-LOK ML

## (undated) DEVICE — SET PNEUMOCLEAR HEAT HUM SE HF

## (undated) DEVICE — SUT VICRYL 4-0 27 SH

## (undated) DEVICE — COVER LIGHT HANDLE 80/CA

## (undated) DEVICE — BLADE INFERIOR TURBINATE 2MM

## (undated) DEVICE — BAG TISSUE RETRIEVAL 5MM